# Patient Record
Sex: MALE | Race: WHITE | Employment: OTHER | URBAN - NONMETROPOLITAN AREA
[De-identification: names, ages, dates, MRNs, and addresses within clinical notes are randomized per-mention and may not be internally consistent; named-entity substitution may affect disease eponyms.]

---

## 2017-09-26 ENCOUNTER — TELEPHONE (OUTPATIENT)
Dept: CARDIOLOGY | Age: 52
End: 2017-09-26

## 2018-01-24 ENCOUNTER — OFFICE VISIT (OUTPATIENT)
Dept: CARDIOLOGY | Age: 53
End: 2018-01-24
Payer: MEDICARE

## 2018-01-24 VITALS
BODY MASS INDEX: 39.86 KG/M2 | HEIGHT: 68 IN | DIASTOLIC BLOOD PRESSURE: 78 MMHG | SYSTOLIC BLOOD PRESSURE: 110 MMHG | HEART RATE: 66 BPM | WEIGHT: 263 LBS

## 2018-01-24 DIAGNOSIS — R06.02 SHORTNESS OF BREATH: ICD-10-CM

## 2018-01-24 DIAGNOSIS — R60.0 FLUID RETENTION IN LEGS: ICD-10-CM

## 2018-01-24 DIAGNOSIS — R07.2 PRECORDIAL PAIN: Primary | ICD-10-CM

## 2018-01-24 PROCEDURE — 3017F COLORECTAL CA SCREEN DOC REV: CPT | Performed by: INTERNAL MEDICINE

## 2018-01-24 PROCEDURE — G8484 FLU IMMUNIZE NO ADMIN: HCPCS | Performed by: INTERNAL MEDICINE

## 2018-01-24 PROCEDURE — G8417 CALC BMI ABV UP PARAM F/U: HCPCS | Performed by: INTERNAL MEDICINE

## 2018-01-24 PROCEDURE — 99213 OFFICE O/P EST LOW 20 MIN: CPT | Performed by: INTERNAL MEDICINE

## 2018-01-24 PROCEDURE — G8427 DOCREV CUR MEDS BY ELIG CLIN: HCPCS | Performed by: INTERNAL MEDICINE

## 2018-01-24 PROCEDURE — 4004F PT TOBACCO SCREEN RCVD TLK: CPT | Performed by: INTERNAL MEDICINE

## 2018-01-24 RX ORDER — FUROSEMIDE 40 MG/1
40 TABLET ORAL DAILY
Qty: 30 TABLET | Refills: 3 | Status: SHIPPED | OUTPATIENT
Start: 2018-01-24 | End: 2019-09-10

## 2018-01-24 NOTE — PROGRESS NOTES
Medication Sig Dispense Refill    furosemide (LASIX) 40 MG tablet Take 1 tablet by mouth daily 30 tablet 3    atenolol-chlorthalidone (TENORETIC 50) 50-25 MG per tablet Take 1 tablet by mouth daily 30 tablet 1    losartan (COZAAR) 50 MG tablet Take 1 tablet by mouth 2 times daily 60 tablet 5    albuterol (PROVENTIL HFA;VENTOLIN HFA) 108 (90 BASE) MCG/ACT inhaler Inhale 2 puffs into the lungs every 6 hours as needed for Wheezing. Data:  BP Readings from Last 3 Encounters:   01/24/18 110/78   09/28/16 120/84   04/14/16 150/84    Pulse Readings from Last 3 Encounters:   01/24/18 66   09/28/16 76   04/14/16 78        Estimated body mass index is 39.99 kg/m² as calculated from the following:    Height as of this encounter: 5' 8\" (1.727 m). Weight as of this encounter: 263 lb (119.3 kg). REVIEW OF SYSTEMS  Constitutional:  Negative for diaphoresis, fever, appetite change or unexpected weight change. HENT:  Negative for nosebleeds, facial swelling, rhinorrhea and neck stiffness. RESPIRATORY:  c/o shortness of breath. Negative cough or sputum production. No wheezing or stridor. CARDIOVASCULAR:  There is no angina, no overt heart failure, and no syncope. GASTROINTESTINAL:   Negative for abdominal distention. GENITOURINARY:  Negative for dysuria, urgency and frequency. MUSCULOSKELETAL:   c/o lower extremity edema. Negative for myalgia, arthralgia and gait problem. SKIN:  Negative for color change, pallor, rash and wound. NEUROLOGICAL:   Negative for dizziness, weakness, light-headedness, numbness and headaches. Negative for speech difficulty. HEMATOLOGICAL:   Negative for bruising and bleeding easily. PSYCHIATRIC/BEHAVIORAL:   No excessive anxiety or confusion. Except as noted in the HPI, all other systems are negative. PHYSICAL EXAMINATION  GENERAL:  Alert and oriented x3 in no apparent distress. Short-term and long-term memory intact. Judgment intact.   Oriented to time, place and person. No depression, anxiety or agitation. Vital Signs:  /78   Pulse 66   Ht 5' 8\" (1.727 m)   Wt 263 lb (119.3 kg)   BMI 39.99 kg/m²    HEAD:  Normocephalic without evidence of old or recent trauma. EYES:  Sclerae clear. Conjunctivae pink. Pupils equal and round. NOSE:  Negative nasal discharge or epistaxis. THROAT:  No lesions on lips or buccal mucosa. NECK:  Supple without mass or JVD. Carotid pulses 2+ to palpation bilaterally without bruit. No thyromegaly noted. CHEST:  Equal bilateral expansion. RESPIRATORY:  The lungs are clear to auscultation. Normal respiratory effort. CARDIOVASCULAR:  The heart's rhythm is regular with a normal rate. No audible murmurs or gallops. ABDOMEN:  Soft, nontender. Exhibits no distension. Bowel sounds are normal.   UPPER EXTREMITY EVALUATION:  Radial pulses palpable bilaterally. No cyanosis, clubbing or edema. LOWER EXTREMITY EVALUATION:  Femoral, popliteal, dorsalis pedis, and posterior tibialis pulses 2+ to palpation bilaterally. No cyanosis, clubbing, or peripheral edema. SKIN:  Warm and dry. MUSCULOSKELETAL:  Normal muscle strength and tone. SKIN:  Warm, dry. NEUROLOGIC:  Cranial nerves II through XII are grossly intact. IMPRESSION / PLAN  1. Possible fluid retention and persistent shortness of breath - we will do a Dobutamine Stress Echo and add Lasix 40 mg po daily. 2.  Return in 4-6 weeks. Orders Placed This Encounter   Procedures    ECHO Pharmacological Stress Test     Standing Status:   Future     Standing Expiration Date:   1/24/2019     Order Specific Question:   Reason for exam:     Answer:   chest pain     Orders Placed This Encounter   Medications    furosemide (LASIX) 40 MG tablet     Sig: Take 1 tablet by mouth daily     Dispense:  30 tablet     Refill:  3     __________________________________  Radha Ferguson. Torres Araujo M.D., Ph.D., F.A.C.C. [id number] [facility]    cc (pcp):  No primary

## 2018-01-24 NOTE — PATIENT INSTRUCTIONS
Start taking Lasix 40 mg one tablet daily. Dawn at the [address] [place] [address] and [address] [facility] located on the first floor of [place] [address] through hospital main entrance and turn immediately to your left. Allergies:  Iodine; Lisinopril; and Shellfish-derived products     Patient's contact number:  [phone] (home)      Dobutamine Stress Echocardiogram     A chemical stress test uses chemical agents injected into the body through the vein. These chemicals make the heart function as if it were under stress. A chemical stress test is used when a traditional stress test (called a cardiac stress test) cannot be done. Testing will take approximately one hour. Dobutamine Stress Echocardiogram Instructions:   No caffeine 24 hours prior to the testing. This includes: coffee, pop/soda, chocolate, cold medications, etc.  Any product that might contain caffeine. Do not eat or drink anything, except water, eight (8) hours before the test.   No alcohol or nicotine twelve (12) hours prior to your test.   Wear comfortable clothing. If you need to change this appointment, please call outpatient scheduling at [phone].

## 2018-01-25 ENCOUNTER — TELEPHONE (OUTPATIENT)
Dept: CARDIOLOGY | Age: 53
End: 2018-01-25

## 2018-01-30 ENCOUNTER — HOSPITAL ENCOUNTER (OUTPATIENT)
Dept: NON INVASIVE DIAGNOSTICS | Age: 53
Discharge: HOME | End: 2018-01-30
Payer: MEDICARE

## 2018-01-30 VITALS
WEIGHT: 260.14 LBS | DIASTOLIC BLOOD PRESSURE: 52 MMHG | SYSTOLIC BLOOD PRESSURE: 106 MMHG | BODY MASS INDEX: 39.55 KG/M2 | HEART RATE: 96 BPM

## 2018-01-30 DIAGNOSIS — R07.2 PRECORDIAL PAIN: ICD-10-CM

## 2018-01-30 PROCEDURE — 93350 STRESS TTE ONLY: CPT

## 2018-01-30 PROCEDURE — 6360000002 HC RX W HCPCS: Performed by: INTERNAL MEDICINE

## 2018-01-30 PROCEDURE — 2580000003 HC RX 258: Performed by: INTERNAL MEDICINE

## 2018-01-30 RX ORDER — ATROPINE SULFATE 0.1 MG/ML
1 INJECTION INTRAVENOUS PRN
Status: ACTIVE | OUTPATIENT
Start: 2018-01-30 | End: 2018-01-31

## 2018-01-30 RX ORDER — DOBUTAMINE HYDROCHLORIDE 200 MG/100ML
10 INJECTION INTRAVENOUS CONTINUOUS PRN
Status: ACTIVE | OUTPATIENT
Start: 2018-01-30 | End: 2018-01-31

## 2018-01-30 RX ORDER — SODIUM CHLORIDE 9 MG/ML
INJECTION, SOLUTION INTRAVENOUS
Status: COMPLETED | OUTPATIENT
Start: 2018-01-30 | End: 2018-01-30

## 2018-01-30 RX ORDER — SODIUM CHLORIDE 0.9 % (FLUSH) 0.9 %
10 SYRINGE (ML) INJECTION PRN
Status: ACTIVE | OUTPATIENT
Start: 2018-01-30 | End: 2018-01-31

## 2018-01-30 RX ADMIN — Medication 10 ML: at 10:46

## 2018-01-30 RX ADMIN — ATROPINE SULFATE 1 MG: 0.1 INJECTION PARENTERAL at 10:56

## 2018-01-30 RX ADMIN — DOBUTAMINE HYDROCHLORIDE 10 MCG/KG/MIN: 200 INJECTION INTRAVENOUS at 10:47

## 2018-01-30 RX ADMIN — SODIUM CHLORIDE 250 ML: 9 INJECTION, SOLUTION INTRAVENOUS at 10:50

## 2018-02-28 ENCOUNTER — OFFICE VISIT (OUTPATIENT)
Dept: CARDIOLOGY | Age: 53
End: 2018-02-28
Payer: MEDICARE

## 2018-02-28 VITALS
DIASTOLIC BLOOD PRESSURE: 100 MMHG | HEIGHT: 67 IN | HEART RATE: 60 BPM | BODY MASS INDEX: 42.06 KG/M2 | WEIGHT: 268 LBS | SYSTOLIC BLOOD PRESSURE: 160 MMHG

## 2018-02-28 DIAGNOSIS — R60.0 FLUID RETENTION IN LEGS: ICD-10-CM

## 2018-02-28 DIAGNOSIS — I10 ESSENTIAL HYPERTENSION: Primary | ICD-10-CM

## 2018-02-28 PROCEDURE — G8484 FLU IMMUNIZE NO ADMIN: HCPCS | Performed by: INTERNAL MEDICINE

## 2018-02-28 PROCEDURE — G8427 DOCREV CUR MEDS BY ELIG CLIN: HCPCS | Performed by: INTERNAL MEDICINE

## 2018-02-28 PROCEDURE — 4004F PT TOBACCO SCREEN RCVD TLK: CPT | Performed by: INTERNAL MEDICINE

## 2018-02-28 PROCEDURE — G8417 CALC BMI ABV UP PARAM F/U: HCPCS | Performed by: INTERNAL MEDICINE

## 2018-02-28 PROCEDURE — 99213 OFFICE O/P EST LOW 20 MIN: CPT | Performed by: INTERNAL MEDICINE

## 2018-02-28 PROCEDURE — 3017F COLORECTAL CA SCREEN DOC REV: CPT | Performed by: INTERNAL MEDICINE

## 2018-03-02 NOTE — PROGRESS NOTES
prior to his next appointment in May. 3.  Continue same medications. 4.  Return in three months.               __________________________________  Kwesi Gipson. Luis Zurita M.D., Ph.D., F.A.C.C. [facility]    cc (pcp): No primary care provider on file.

## 2019-06-24 ENCOUNTER — HOSPITAL ENCOUNTER (EMERGENCY)
Facility: HOSPITAL | Age: 54
Discharge: HOME | End: 2019-06-24
Attending: EMERGENCY MEDICINE | Admitting: EMERGENCY MEDICINE

## 2019-06-24 VITALS
HEART RATE: 84 BPM | HEIGHT: 68 IN | RESPIRATION RATE: 16 BRPM | WEIGHT: 273 LBS | OXYGEN SATURATION: 96 % | SYSTOLIC BLOOD PRESSURE: 152 MMHG | TEMPERATURE: 98.2 F | BODY MASS INDEX: 41.37 KG/M2 | DIASTOLIC BLOOD PRESSURE: 90 MMHG

## 2019-06-24 DIAGNOSIS — J39.2 PHARYNGEAL IRRITATION: Primary | ICD-10-CM

## 2019-06-24 PROBLEM — J37.0 CHRONIC LARYNGITIS: Status: ACTIVE | Noted: 2019-06-24

## 2019-06-24 PROBLEM — R09.89 GLOBUS SENSATION: Status: ACTIVE | Noted: 2019-06-24

## 2019-06-24 PROBLEM — R09.A2 GLOBUS SENSATION: Status: ACTIVE | Noted: 2019-06-24

## 2019-06-24 PROCEDURE — 99283 EMERGENCY DEPT VISIT LOW MDM: CPT | Performed by: OTOLARYNGOLOGY

## 2019-06-24 PROCEDURE — 31575 DIAGNOSTIC LARYNGOSCOPY: CPT | Performed by: OTOLARYNGOLOGY

## 2019-06-24 PROCEDURE — 99282 EMERGENCY DEPT VISIT SF MDM: CPT

## 2019-06-24 RX ORDER — OMEPRAZOLE 40 MG/1
40 CAPSULE, DELAYED RELEASE ORAL DAILY
Qty: 30 CAPSULE | Refills: 3 | Status: SHIPPED | OUTPATIENT
Start: 2019-06-24 | End: 2019-07-24

## 2019-06-24 RX ORDER — GUAIFENESIN 600 MG/1
600 TABLET, EXTENDED RELEASE ORAL EVERY 12 HOURS SCHEDULED
Qty: 60 TABLET | Refills: 3 | Status: SHIPPED | OUTPATIENT
Start: 2019-06-24 | End: 2019-07-24

## 2019-09-10 ENCOUNTER — TELEPHONE (OUTPATIENT)
Dept: CARDIOLOGY | Age: 54
End: 2019-09-10

## 2019-09-10 ENCOUNTER — OFFICE VISIT (OUTPATIENT)
Dept: CARDIOLOGY | Age: 54
End: 2019-09-10
Payer: MEDICARE

## 2019-09-10 VITALS
SYSTOLIC BLOOD PRESSURE: 130 MMHG | HEART RATE: 102 BPM | OXYGEN SATURATION: 98 % | BODY MASS INDEX: 43.07 KG/M2 | WEIGHT: 275 LBS | DIASTOLIC BLOOD PRESSURE: 80 MMHG

## 2019-09-10 DIAGNOSIS — R00.0 TACHYCARDIA: ICD-10-CM

## 2019-09-10 DIAGNOSIS — R40.0 DAYTIME SOMNOLENCE: ICD-10-CM

## 2019-09-10 DIAGNOSIS — G47.10 HYPERSOMNIA: ICD-10-CM

## 2019-09-10 DIAGNOSIS — I10 ESSENTIAL HYPERTENSION: Primary | ICD-10-CM

## 2019-09-10 DIAGNOSIS — R06.09 DYSPNEA ON EXERTION: ICD-10-CM

## 2019-09-10 PROCEDURE — 99214 OFFICE O/P EST MOD 30 MIN: CPT | Performed by: NURSE PRACTITIONER

## 2019-09-10 PROCEDURE — 93000 ELECTROCARDIOGRAM COMPLETE: CPT | Performed by: NURSE PRACTITIONER

## 2019-09-10 PROCEDURE — 0296T PR EXT ECG > 48HR TO 21 DAY RCRD W/CONECT INTL RCRD: CPT | Performed by: NURSE PRACTITIONER

## 2019-09-10 RX ORDER — LOSARTAN POTASSIUM 50 MG/1
50 TABLET ORAL DAILY
Qty: 90 TABLET | Refills: 3 | Status: SHIPPED | OUTPATIENT
Start: 2019-09-10 | End: 2019-10-14 | Stop reason: SDUPTHER

## 2019-09-10 RX ORDER — PREGABALIN 50 MG/1
50 CAPSULE ORAL 2 TIMES DAILY
COMMUNITY
End: 2020-03-10 | Stop reason: SDUPTHER

## 2019-09-10 NOTE — PROGRESS NOTES
[facility]  Established Patient Office Visit   Gregorio Posadas. [facility] [facility] [id number] [phone]        OFFICE VISIT:  9/10/2019    Emile Miller - : 1965    Reason For Visit:  José Miguel Barragan is a 53 y.o. male who is here for Follow-up (Complains of shortness of breath for last 3-4 years; pt request a heart cath; no stress test ) and Hypertension    1. Essential hypertension    2. Dyspnea on exertion    3. Tachycardia      Patient presents to clinic with complaints of shortness of breath for the last 3 to 4 years with exertion that has worsened over the last several months. She states just walking around the house he gets short of breath. He denies any chest pain, pressure or tightness. Patient states he does have sleep apnea that is not treated. Patient has a very strong family history of coronary artery disease. Patient has also noted tachycardia/palpitations. There is no associated lightheadedness, dizziness or syncope. Last stress test 2018 dobutamine stress echo without clinical, electrocardiographic, or echocardiographic evidence of myocardial ischemia. Subjective    Emile Miller is a 53 y.o. male with the following history as [facility] in [facility]:    Patient Active Problem List    Diagnosis Date Noted    Essential hypertension 2016    Hypertension     Other and unspecified hyperlipidemia      Current Outpatient Medications   Medication Sig Dispense Refill    pregabalin (LYRICA) 50 MG capsule Take 50 mg by mouth 2 times daily.  losartan (COZAAR) 50 MG tablet Take 1 tablet by mouth 2 times daily (Patient taking differently: Take 50 mg by mouth daily ) 60 tablet 5    albuterol (PROVENTIL HFA;VENTOLIN HFA) 108 (90 BASE) MCG/ACT inhaler Inhale 2 puffs into the lungs every 6 hours as needed for Wheezing. No current facility-administered medications for this visit. Allergies: Iodine;  Lisinopril; and Shellfish-derived products  Past

## 2019-10-08 ENCOUNTER — TELEPHONE (OUTPATIENT)
Dept: CARDIOLOGY | Age: 54
End: 2019-10-08

## 2019-10-09 ENCOUNTER — HOSPITAL ENCOUNTER (OUTPATIENT)
Dept: NON INVASIVE DIAGNOSTICS | Age: 54
Discharge: HOME | End: 2019-10-09
Payer: MEDICARE

## 2019-10-09 ENCOUNTER — HOSPITAL ENCOUNTER (OUTPATIENT)
Dept: NUCLEAR MEDICINE | Age: 54
Discharge: HOME | End: 2019-10-11
Payer: MEDICARE

## 2019-10-09 DIAGNOSIS — R06.09 DYSPNEA ON EXERTION: ICD-10-CM

## 2019-10-09 DIAGNOSIS — R00.0 TACHYCARDIA: ICD-10-CM

## 2019-10-09 LAB
LV EF: 58 %
LVEF MODALITY: NORMAL

## 2019-10-09 PROCEDURE — 78452 HT MUSCLE IMAGE SPECT MULT: CPT

## 2019-10-09 PROCEDURE — 3430000000 HC RX DIAGNOSTIC RADIOPHARMACEUTICAL: Performed by: NURSE PRACTITIONER

## 2019-10-09 PROCEDURE — A9500 TC99M SESTAMIBI: HCPCS | Performed by: NURSE PRACTITIONER

## 2019-10-09 PROCEDURE — 93306 TTE W/DOPPLER COMPLETE: CPT

## 2019-10-09 PROCEDURE — 6360000002 HC RX W HCPCS: Performed by: INTERNAL MEDICINE

## 2019-10-09 PROCEDURE — 93017 CV STRESS TEST TRACING ONLY: CPT

## 2019-10-09 RX ADMIN — REGADENOSON 0.4 MG: 0.08 INJECTION, SOLUTION INTRAVENOUS at 12:30

## 2019-10-09 RX ADMIN — TETRAKIS(2-METHOXYISOBUTYLISOCYANIDE)COPPER(I) TETRAFLUOROBORATE 10 MILLICURIE: 1 INJECTION, POWDER, LYOPHILIZED, FOR SOLUTION INTRAVENOUS at 12:45

## 2019-10-09 RX ADMIN — TETRAKIS(2-METHOXYISOBUTYLISOCYANIDE)COPPER(I) TETRAFLUOROBORATE 30 MILLICURIE: 1 INJECTION, POWDER, LYOPHILIZED, FOR SOLUTION INTRAVENOUS at 12:45

## 2019-10-10 LAB
LV EF: 68 %
LVEF MODALITY: NORMAL

## 2019-10-14 ENCOUNTER — OFFICE VISIT (OUTPATIENT)
Dept: CARDIOLOGY | Age: 54
End: 2019-10-14
Payer: MEDICARE

## 2019-10-14 VITALS
OXYGEN SATURATION: 99 % | SYSTOLIC BLOOD PRESSURE: 140 MMHG | WEIGHT: 280 LBS | HEART RATE: 92 BPM | HEIGHT: 68 IN | DIASTOLIC BLOOD PRESSURE: 80 MMHG | BODY MASS INDEX: 42.44 KG/M2

## 2019-10-14 DIAGNOSIS — I10 ESSENTIAL HYPERTENSION: Primary | ICD-10-CM

## 2019-10-14 PROCEDURE — G8417 CALC BMI ABV UP PARAM F/U: HCPCS | Performed by: NURSE PRACTITIONER

## 2019-10-14 PROCEDURE — 4004F PT TOBACCO SCREEN RCVD TLK: CPT | Performed by: NURSE PRACTITIONER

## 2019-10-14 PROCEDURE — G8427 DOCREV CUR MEDS BY ELIG CLIN: HCPCS | Performed by: NURSE PRACTITIONER

## 2019-10-14 PROCEDURE — G8484 FLU IMMUNIZE NO ADMIN: HCPCS | Performed by: NURSE PRACTITIONER

## 2019-10-14 PROCEDURE — 99214 OFFICE O/P EST MOD 30 MIN: CPT | Performed by: NURSE PRACTITIONER

## 2019-10-14 PROCEDURE — 3017F COLORECTAL CA SCREEN DOC REV: CPT | Performed by: NURSE PRACTITIONER

## 2019-10-14 RX ORDER — LOSARTAN POTASSIUM 50 MG/1
50 TABLET ORAL DAILY
Qty: 90 TABLET | Refills: 3 | Status: SHIPPED | OUTPATIENT
Start: 2019-10-14 | End: 2020-01-10 | Stop reason: SDUPTHER

## 2019-10-15 RX ORDER — PREDNISONE 20 MG/1
TABLET ORAL
Qty: 10 TABLET | Refills: 0 | Status: ON HOLD | OUTPATIENT
Start: 2019-10-22 | End: 2019-10-23 | Stop reason: HOSPADM

## 2019-10-15 RX ORDER — DIPHENHYDRAMINE HCL 25 MG
TABLET ORAL
Qty: 5 TABLET | Refills: 0 | Status: ON HOLD | OUTPATIENT
Start: 2019-10-22 | End: 2019-10-23 | Stop reason: HOSPADM

## 2019-10-15 RX ORDER — CIMETIDINE 300 MG/1
TABLET, FILM COATED ORAL
Qty: 4 TABLET | Refills: 0 | Status: SHIPPED | OUTPATIENT
Start: 2019-10-22 | End: 2020-01-10 | Stop reason: ALTCHOICE

## 2019-10-23 ENCOUNTER — HOSPITAL ENCOUNTER (OUTPATIENT)
Dept: CARDIAC CATH/INVASIVE PROCEDURES | Age: 54
Discharge: HOME | End: 2019-10-23
Attending: INTERNAL MEDICINE | Admitting: INTERNAL MEDICINE
Payer: MEDICARE

## 2019-10-23 ENCOUNTER — APPOINTMENT (OUTPATIENT)
Dept: GENERAL RADIOLOGY | Age: 54
End: 2019-10-23
Attending: INTERNAL MEDICINE
Payer: MEDICARE

## 2019-10-23 VITALS
TEMPERATURE: 97.8 F | HEART RATE: 98 BPM | RESPIRATION RATE: 20 BRPM | WEIGHT: 280 LBS | HEIGHT: 68 IN | DIASTOLIC BLOOD PRESSURE: 70 MMHG | SYSTOLIC BLOOD PRESSURE: 131 MMHG | BODY MASS INDEX: 42.44 KG/M2 | OXYGEN SATURATION: 90 %

## 2019-10-23 PROBLEM — R07.89 CHEST DISCOMFORT: Status: ACTIVE | Noted: 2019-10-23

## 2019-10-23 LAB
ANION GAP SERPL CALCULATED.3IONS-SCNC: 14 MMOL/L (ref 7–19)
BUN BLDV-MCNC: 25 MG/DL (ref 6–20)
CALCIUM SERPL-MCNC: 10.1 MG/DL (ref 8.6–10)
CHLORIDE BLD-SCNC: 107 MMOL/L (ref 98–111)
CO2: 18 MMOL/L (ref 22–29)
CREAT SERPL-MCNC: 0.9 MG/DL (ref 0.5–1.2)
GFR NON-AFRICAN AMERICAN: >60
GLUCOSE BLD-MCNC: 143 MG/DL (ref 74–109)
HCT VFR BLD CALC: 45.4 % (ref 42–52)
HEMOGLOBIN: 13.8 G/DL (ref 14–18)
MCH RBC QN AUTO: 30.2 PG (ref 27–31)
MCHC RBC AUTO-ENTMCNC: 30.4 G/DL (ref 33–37)
MCV RBC AUTO: 99.3 FL (ref 80–94)
PDW BLD-RTO: 13.5 % (ref 11.5–14.5)
PLATELET # BLD: 458 K/UL (ref 130–400)
PMV BLD AUTO: 9.7 FL (ref 9.4–12.4)
POTASSIUM SERPL-SCNC: 4.4 MMOL/L (ref 3.5–5)
RBC # BLD: 4.57 M/UL (ref 4.7–6.1)
SODIUM BLD-SCNC: 139 MMOL/L (ref 136–145)
WBC # BLD: 30.8 K/UL (ref 4.8–10.8)

## 2019-10-23 PROCEDURE — C1894 INTRO/SHEATH, NON-LASER: HCPCS

## 2019-10-23 PROCEDURE — 71046 X-RAY EXAM CHEST 2 VIEWS: CPT

## 2019-10-23 PROCEDURE — 2580000003 HC RX 258: Performed by: INTERNAL MEDICINE

## 2019-10-23 PROCEDURE — 6370000000 HC RX 637 (ALT 250 FOR IP): Performed by: INTERNAL MEDICINE

## 2019-10-23 PROCEDURE — 93459 L HRT ART/GRFT ANGIO: CPT | Performed by: INTERNAL MEDICINE

## 2019-10-23 PROCEDURE — 36415 COLL VENOUS BLD VENIPUNCTURE: CPT

## 2019-10-23 PROCEDURE — C1760 CLOSURE DEV, VASC: HCPCS

## 2019-10-23 PROCEDURE — 80048 BASIC METABOLIC PNL TOTAL CA: CPT

## 2019-10-23 PROCEDURE — 6360000004 HC RX CONTRAST MEDICATION: Performed by: INTERNAL MEDICINE

## 2019-10-23 PROCEDURE — 99152 MOD SED SAME PHYS/QHP 5/>YRS: CPT | Performed by: INTERNAL MEDICINE

## 2019-10-23 PROCEDURE — 2709999900 HC NON-CHARGEABLE SUPPLY

## 2019-10-23 PROCEDURE — 85027 COMPLETE CBC AUTOMATED: CPT

## 2019-10-23 PROCEDURE — 6360000002 HC RX W HCPCS

## 2019-10-23 PROCEDURE — 2500000003 HC RX 250 WO HCPCS

## 2019-10-23 RX ORDER — SODIUM CHLORIDE 0.9 % (FLUSH) 0.9 %
10 SYRINGE (ML) INJECTION PRN
Status: DISCONTINUED | OUTPATIENT
Start: 2019-10-23 | End: 2019-10-23 | Stop reason: HOSPADM

## 2019-10-23 RX ORDER — ALPRAZOLAM 0.5 MG/1
0.5 TABLET ORAL NIGHTLY PRN
Status: DISCONTINUED | OUTPATIENT
Start: 2019-10-23 | End: 2019-10-23 | Stop reason: CLARIF

## 2019-10-23 RX ORDER — SODIUM CHLORIDE 9 MG/ML
INJECTION, SOLUTION INTRAVENOUS CONTINUOUS
Status: DISCONTINUED | OUTPATIENT
Start: 2019-10-23 | End: 2019-10-23 | Stop reason: HOSPADM

## 2019-10-23 RX ORDER — SODIUM CHLORIDE 0.9 % (FLUSH) 0.9 %
10 SYRINGE (ML) INJECTION EVERY 12 HOURS SCHEDULED
Status: DISCONTINUED | OUTPATIENT
Start: 2019-10-23 | End: 2019-10-23 | Stop reason: HOSPADM

## 2019-10-23 RX ORDER — ALPRAZOLAM 0.5 MG/1
0.5 TABLET ORAL ONCE
Status: COMPLETED | OUTPATIENT
Start: 2019-10-23 | End: 2019-10-23

## 2019-10-23 RX ORDER — IODIXANOL 320 MG/ML
150 INJECTION, SOLUTION INTRAVASCULAR
Status: COMPLETED | OUTPATIENT
Start: 2019-10-23 | End: 2019-10-23

## 2019-10-23 RX ORDER — HYDROCODONE BITARTRATE AND ACETAMINOPHEN 10; 325 MG/1; MG/1
1 TABLET ORAL ONCE
Status: COMPLETED | OUTPATIENT
Start: 2019-10-23 | End: 2019-10-23

## 2019-10-23 RX ADMIN — ALPRAZOLAM 0.5 MG: 0.5 TABLET ORAL at 09:49

## 2019-10-23 RX ADMIN — SODIUM CHLORIDE: 9 INJECTION, SOLUTION INTRAVENOUS at 09:49

## 2019-10-23 RX ADMIN — HYDROCODONE BITARTRATE AND ACETAMINOPHEN 1 TABLET: 10; 325 TABLET ORAL at 15:13

## 2019-10-23 RX ADMIN — IODIXANOL 131 ML: 320 INJECTION, SOLUTION INTRAVASCULAR at 13:46

## 2019-10-23 ASSESSMENT — PAIN DESCRIPTION - LOCATION: LOCATION: BACK;NECK;HEAD

## 2019-10-23 ASSESSMENT — PAIN DESCRIPTION - FREQUENCY: FREQUENCY: CONTINUOUS

## 2019-10-23 ASSESSMENT — PAIN DESCRIPTION - ONSET: ONSET: ON-GOING

## 2019-10-23 ASSESSMENT — PAIN DESCRIPTION - DESCRIPTORS: DESCRIPTORS: ACHING;STABBING

## 2019-10-23 ASSESSMENT — PAIN DESCRIPTION - PAIN TYPE: TYPE: CHRONIC PAIN

## 2019-10-23 ASSESSMENT — PAIN SCALES - GENERAL
PAINLEVEL_OUTOF10: 4
PAINLEVEL_OUTOF10: 7

## 2019-10-23 ASSESSMENT — PAIN - FUNCTIONAL ASSESSMENT: PAIN_FUNCTIONAL_ASSESSMENT: PREVENTS OR INTERFERES WITH MANY ACTIVE NOT PASSIVE ACTIVITIES

## 2019-10-23 ASSESSMENT — PAIN DESCRIPTION - PROGRESSION: CLINICAL_PROGRESSION: NOT CHANGED

## 2019-10-24 ENCOUNTER — TELEPHONE (OUTPATIENT)
Dept: HEMATOLOGY | Age: 54
End: 2019-10-24

## 2019-10-24 ENCOUNTER — HOSPITAL ENCOUNTER (OUTPATIENT)
Dept: INFUSION THERAPY | Age: 54
Discharge: HOME | End: 2019-10-24
Payer: MEDICARE

## 2019-10-24 ENCOUNTER — OFFICE VISIT (OUTPATIENT)
Dept: HEMATOLOGY | Age: 54
End: 2019-10-24
Payer: MEDICARE

## 2019-10-24 VITALS
BODY MASS INDEX: 41.82 KG/M2 | DIASTOLIC BLOOD PRESSURE: 88 MMHG | HEIGHT: 68 IN | HEART RATE: 104 BPM | SYSTOLIC BLOOD PRESSURE: 136 MMHG | OXYGEN SATURATION: 95 % | WEIGHT: 275.9 LBS

## 2019-10-24 DIAGNOSIS — Z90.81 H/O SPLENECTOMY: ICD-10-CM

## 2019-10-24 DIAGNOSIS — D72.829 LEUKOCYTOSIS, UNSPECIFIED TYPE: ICD-10-CM

## 2019-10-24 DIAGNOSIS — D75.839 THROMBOCYTOSIS: ICD-10-CM

## 2019-10-24 DIAGNOSIS — D72.829 LEUKOCYTOSIS, UNSPECIFIED TYPE: Primary | ICD-10-CM

## 2019-10-24 LAB
LV EF: 68 %
LVEF MODALITY: NORMAL

## 2019-10-24 PROCEDURE — 4004F PT TOBACCO SCREEN RCVD TLK: CPT | Performed by: PHYSICIAN ASSISTANT

## 2019-10-24 PROCEDURE — 80053 COMPREHEN METABOLIC PANEL: CPT

## 2019-10-24 PROCEDURE — 86140 C-REACTIVE PROTEIN: CPT

## 2019-10-24 PROCEDURE — G8427 DOCREV CUR MEDS BY ELIG CLIN: HCPCS | Performed by: PHYSICIAN ASSISTANT

## 2019-10-24 PROCEDURE — 85025 COMPLETE CBC W/AUTO DIFF WBC: CPT

## 2019-10-24 PROCEDURE — G8417 CALC BMI ABV UP PARAM F/U: HCPCS | Performed by: PHYSICIAN ASSISTANT

## 2019-10-24 PROCEDURE — 3017F COLORECTAL CA SCREEN DOC REV: CPT | Performed by: PHYSICIAN ASSISTANT

## 2019-10-24 PROCEDURE — 99203 OFFICE O/P NEW LOW 30 MIN: CPT | Performed by: PHYSICIAN ASSISTANT

## 2019-10-24 PROCEDURE — G8484 FLU IMMUNIZE NO ADMIN: HCPCS | Performed by: PHYSICIAN ASSISTANT

## 2019-10-24 ASSESSMENT — ENCOUNTER SYMPTOMS
NAUSEA: 0
ABDOMINAL DISTENTION: 0
COLOR CHANGE: 0
VOMITING: 0
PHOTOPHOBIA: 0
TROUBLE SWALLOWING: 0
SHORTNESS OF BREATH: 1
WHEEZING: 0
ABDOMINAL PAIN: 0
BACK PAIN: 1
COUGH: 0
CONSTIPATION: 0
SORE THROAT: 0
EYE DISCHARGE: 0
BLOOD IN STOOL: 0
DIARRHEA: 0
VOICE CHANGE: 0
EYE ITCHING: 0

## 2019-11-07 ENCOUNTER — HOSPITAL ENCOUNTER (OUTPATIENT)
Dept: INFUSION THERAPY | Age: 54
Discharge: HOME | End: 2019-11-07
Payer: MEDICARE

## 2019-11-07 ENCOUNTER — OFFICE VISIT (OUTPATIENT)
Dept: HEMATOLOGY | Age: 54
End: 2019-11-07
Payer: MEDICARE

## 2019-11-07 VITALS
HEIGHT: 68 IN | DIASTOLIC BLOOD PRESSURE: 90 MMHG | SYSTOLIC BLOOD PRESSURE: 150 MMHG | HEART RATE: 103 BPM | OXYGEN SATURATION: 96 % | WEIGHT: 278.5 LBS | BODY MASS INDEX: 42.21 KG/M2

## 2019-11-07 DIAGNOSIS — I25.10 ATHEROSCLEROSIS OF NATIVE CORONARY ARTERY OF NATIVE HEART WITHOUT ANGINA PECTORIS: ICD-10-CM

## 2019-11-07 DIAGNOSIS — I73.9 PAD (PERIPHERAL ARTERY DISEASE) (HCC): ICD-10-CM

## 2019-11-07 DIAGNOSIS — D72.829 LEUKOCYTOSIS, UNSPECIFIED TYPE: Primary | ICD-10-CM

## 2019-11-07 DIAGNOSIS — Z90.81 HISTORY OF SPLENECTOMY: ICD-10-CM

## 2019-11-07 DIAGNOSIS — D72.829 LEUKOCYTOSIS, UNSPECIFIED TYPE: ICD-10-CM

## 2019-11-07 PROCEDURE — 99213 OFFICE O/P EST LOW 20 MIN: CPT | Performed by: PHYSICIAN ASSISTANT

## 2019-11-07 PROCEDURE — 3017F COLORECTAL CA SCREEN DOC REV: CPT | Performed by: PHYSICIAN ASSISTANT

## 2019-11-07 PROCEDURE — 85025 COMPLETE CBC W/AUTO DIFF WBC: CPT

## 2019-11-07 PROCEDURE — 4004F PT TOBACCO SCREEN RCVD TLK: CPT | Performed by: PHYSICIAN ASSISTANT

## 2019-11-07 PROCEDURE — G8599 NO ASA/ANTIPLAT THER USE RNG: HCPCS | Performed by: PHYSICIAN ASSISTANT

## 2019-11-07 PROCEDURE — G8417 CALC BMI ABV UP PARAM F/U: HCPCS | Performed by: PHYSICIAN ASSISTANT

## 2019-11-07 PROCEDURE — G8484 FLU IMMUNIZE NO ADMIN: HCPCS | Performed by: PHYSICIAN ASSISTANT

## 2019-11-07 PROCEDURE — G8427 DOCREV CUR MEDS BY ELIG CLIN: HCPCS | Performed by: PHYSICIAN ASSISTANT

## 2019-11-07 ASSESSMENT — ENCOUNTER SYMPTOMS
SORE THROAT: 0
VOMITING: 0
TROUBLE SWALLOWING: 0
SHORTNESS OF BREATH: 1
EYE DISCHARGE: 0
BACK PAIN: 1
DIARRHEA: 0
VOICE CHANGE: 0
CONSTIPATION: 0
COLOR CHANGE: 0
NAUSEA: 0
BLOOD IN STOOL: 0
WHEEZING: 0
PHOTOPHOBIA: 0
COUGH: 0
ABDOMINAL DISTENTION: 0
ABDOMINAL PAIN: 0
EYE ITCHING: 0

## 2019-11-08 ENCOUNTER — TELEPHONE (OUTPATIENT)
Dept: CARDIOLOGY | Age: 54
End: 2019-11-08

## 2019-11-26 ENCOUNTER — OFFICE VISIT (OUTPATIENT)
Dept: CARDIOLOGY | Age: 54
End: 2019-11-26
Payer: MEDICARE

## 2019-11-26 VITALS
SYSTOLIC BLOOD PRESSURE: 110 MMHG | DIASTOLIC BLOOD PRESSURE: 60 MMHG | HEIGHT: 68 IN | BODY MASS INDEX: 41.52 KG/M2 | HEART RATE: 98 BPM | WEIGHT: 274 LBS

## 2019-11-26 DIAGNOSIS — Z90.81 HISTORY OF SPLENECTOMY: ICD-10-CM

## 2019-11-26 DIAGNOSIS — D72.829 LEUKOCYTOSIS, UNSPECIFIED TYPE: ICD-10-CM

## 2019-11-26 DIAGNOSIS — Z86.69 HISTORY OF SLEEP APNEA: ICD-10-CM

## 2019-11-26 DIAGNOSIS — I10 ESSENTIAL HYPERTENSION: ICD-10-CM

## 2019-11-26 DIAGNOSIS — I25.10 CORONARY ARTERY DISEASE INVOLVING NATIVE CORONARY ARTERY OF NATIVE HEART, ANGINA PRESENCE UNSPECIFIED: Primary | ICD-10-CM

## 2019-11-26 DIAGNOSIS — R07.89 OTHER CHEST PAIN: ICD-10-CM

## 2019-11-26 PROCEDURE — G8599 NO ASA/ANTIPLAT THER USE RNG: HCPCS | Performed by: NURSE PRACTITIONER

## 2019-11-26 PROCEDURE — G8417 CALC BMI ABV UP PARAM F/U: HCPCS | Performed by: NURSE PRACTITIONER

## 2019-11-26 PROCEDURE — G8484 FLU IMMUNIZE NO ADMIN: HCPCS | Performed by: NURSE PRACTITIONER

## 2019-11-26 PROCEDURE — G8427 DOCREV CUR MEDS BY ELIG CLIN: HCPCS | Performed by: NURSE PRACTITIONER

## 2019-11-26 PROCEDURE — 99214 OFFICE O/P EST MOD 30 MIN: CPT | Performed by: NURSE PRACTITIONER

## 2019-11-26 PROCEDURE — 3017F COLORECTAL CA SCREEN DOC REV: CPT | Performed by: NURSE PRACTITIONER

## 2019-11-26 PROCEDURE — 4004F PT TOBACCO SCREEN RCVD TLK: CPT | Performed by: NURSE PRACTITIONER

## 2019-11-26 RX ORDER — METOPROLOL SUCCINATE 25 MG/1
25 TABLET, EXTENDED RELEASE ORAL DAILY
Qty: 30 TABLET | Refills: 1 | Status: SHIPPED | OUTPATIENT
Start: 2019-11-26 | End: 2020-02-10 | Stop reason: SDUPTHER

## 2019-11-26 RX ORDER — ISOSORBIDE MONONITRATE 30 MG/1
30 TABLET, EXTENDED RELEASE ORAL NIGHTLY
Qty: 30 TABLET | Refills: 1 | Status: SHIPPED | OUTPATIENT
Start: 2019-11-26 | End: 2020-02-24

## 2019-11-27 ENCOUNTER — TELEPHONE (OUTPATIENT)
Dept: CARDIOLOGY | Age: 54
End: 2019-11-27

## 2019-11-27 PROBLEM — R07.89 OTHER CHEST PAIN: Status: ACTIVE | Noted: 2019-11-27

## 2019-11-27 PROBLEM — Z90.81 HISTORY OF SPLENECTOMY: Status: ACTIVE | Noted: 2019-11-27

## 2019-11-27 PROBLEM — Z86.69 HISTORY OF SLEEP APNEA: Status: ACTIVE | Noted: 2019-11-27

## 2019-11-27 PROBLEM — I25.10 CORONARY ARTERY DISEASE INVOLVING NATIVE CORONARY ARTERY OF NATIVE HEART: Status: ACTIVE | Noted: 2019-11-27

## 2019-12-02 ENCOUNTER — TELEPHONE (OUTPATIENT)
Dept: NEUROLOGY | Age: 54
End: 2019-12-02

## 2019-12-02 DIAGNOSIS — I48.91 ATRIAL FIBRILLATION, UNSPECIFIED TYPE (HCC): Primary | ICD-10-CM

## 2019-12-03 ENCOUNTER — TELEPHONE (OUTPATIENT)
Dept: NEUROLOGY | Age: 54
End: 2019-12-03

## 2019-12-03 ENCOUNTER — TELEPHONE (OUTPATIENT)
Dept: CARDIOTHORACIC SURGERY | Age: 54
End: 2019-12-03

## 2019-12-05 ENCOUNTER — TELEPHONE (OUTPATIENT)
Dept: CARDIOTHORACIC SURGERY | Age: 54
End: 2019-12-05

## 2019-12-17 ENCOUNTER — TELEPHONE (OUTPATIENT)
Dept: CARDIOLOGY | Age: 54
End: 2019-12-17

## 2019-12-17 ENCOUNTER — OFFICE VISIT (OUTPATIENT)
Dept: CARDIOTHORACIC SURGERY | Age: 54
End: 2019-12-17
Payer: MEDICARE

## 2019-12-17 VITALS
HEART RATE: 102 BPM | OXYGEN SATURATION: 97 % | BODY MASS INDEX: 41.52 KG/M2 | HEIGHT: 68 IN | WEIGHT: 274 LBS | DIASTOLIC BLOOD PRESSURE: 102 MMHG | SYSTOLIC BLOOD PRESSURE: 148 MMHG

## 2019-12-17 DIAGNOSIS — I25.10 ATHEROSCLEROSIS OF NATIVE CORONARY ARTERY OF NATIVE HEART WITHOUT ANGINA PECTORIS: Primary | ICD-10-CM

## 2019-12-17 DIAGNOSIS — E66.01 MORBID OBESITY WITH BMI OF 40.0-44.9, ADULT (HCC): ICD-10-CM

## 2019-12-17 DIAGNOSIS — J43.1 PANLOBULAR EMPHYSEMA (HCC): ICD-10-CM

## 2019-12-17 PROCEDURE — G8599 NO ASA/ANTIPLAT THER USE RNG: HCPCS | Performed by: THORACIC SURGERY (CARDIOTHORACIC VASCULAR SURGERY)

## 2019-12-17 PROCEDURE — G8484 FLU IMMUNIZE NO ADMIN: HCPCS | Performed by: THORACIC SURGERY (CARDIOTHORACIC VASCULAR SURGERY)

## 2019-12-17 PROCEDURE — G8427 DOCREV CUR MEDS BY ELIG CLIN: HCPCS | Performed by: THORACIC SURGERY (CARDIOTHORACIC VASCULAR SURGERY)

## 2019-12-17 PROCEDURE — 3017F COLORECTAL CA SCREEN DOC REV: CPT | Performed by: THORACIC SURGERY (CARDIOTHORACIC VASCULAR SURGERY)

## 2019-12-17 PROCEDURE — 4004F PT TOBACCO SCREEN RCVD TLK: CPT | Performed by: THORACIC SURGERY (CARDIOTHORACIC VASCULAR SURGERY)

## 2019-12-17 PROCEDURE — G8417 CALC BMI ABV UP PARAM F/U: HCPCS | Performed by: THORACIC SURGERY (CARDIOTHORACIC VASCULAR SURGERY)

## 2019-12-17 PROCEDURE — G8926 SPIRO NO PERF OR DOC: HCPCS | Performed by: THORACIC SURGERY (CARDIOTHORACIC VASCULAR SURGERY)

## 2019-12-17 PROCEDURE — 99204 OFFICE O/P NEW MOD 45 MIN: CPT | Performed by: THORACIC SURGERY (CARDIOTHORACIC VASCULAR SURGERY)

## 2019-12-17 PROCEDURE — 3023F SPIROM DOC REV: CPT | Performed by: THORACIC SURGERY (CARDIOTHORACIC VASCULAR SURGERY)

## 2020-01-02 RX ORDER — AMLODIPINE BESYLATE 5 MG/1
5 TABLET ORAL DAILY
Qty: 90 TABLET | Refills: 1 | Status: SHIPPED | OUTPATIENT
Start: 2020-01-02 | End: 2020-01-14

## 2020-01-10 ENCOUNTER — OFFICE VISIT (OUTPATIENT)
Dept: PRIMARY CARE CLINIC | Age: 55
End: 2020-01-10
Payer: MEDICARE

## 2020-01-10 VITALS
DIASTOLIC BLOOD PRESSURE: 62 MMHG | HEIGHT: 68 IN | HEART RATE: 85 BPM | OXYGEN SATURATION: 95 % | TEMPERATURE: 98.2 F | SYSTOLIC BLOOD PRESSURE: 124 MMHG | WEIGHT: 276 LBS | BODY MASS INDEX: 41.83 KG/M2

## 2020-01-10 PROBLEM — H02.63 XANTHELASMA OF EYELID, BILATERAL: Status: ACTIVE | Noted: 2020-01-10

## 2020-01-10 PROBLEM — H02.66 XANTHELASMA OF EYELID, BILATERAL: Status: ACTIVE | Noted: 2020-01-10

## 2020-01-10 PROCEDURE — G8427 DOCREV CUR MEDS BY ELIG CLIN: HCPCS | Performed by: NURSE PRACTITIONER

## 2020-01-10 PROCEDURE — G8484 FLU IMMUNIZE NO ADMIN: HCPCS | Performed by: NURSE PRACTITIONER

## 2020-01-10 PROCEDURE — 3017F COLORECTAL CA SCREEN DOC REV: CPT | Performed by: NURSE PRACTITIONER

## 2020-01-10 PROCEDURE — 4004F PT TOBACCO SCREEN RCVD TLK: CPT | Performed by: NURSE PRACTITIONER

## 2020-01-10 PROCEDURE — G8417 CALC BMI ABV UP PARAM F/U: HCPCS | Performed by: NURSE PRACTITIONER

## 2020-01-10 PROCEDURE — 99215 OFFICE O/P EST HI 40 MIN: CPT | Performed by: NURSE PRACTITIONER

## 2020-01-10 RX ORDER — LOSARTAN POTASSIUM 50 MG/1
50 TABLET ORAL DAILY
Qty: 90 TABLET | Refills: 1 | Status: SHIPPED | OUTPATIENT
Start: 2020-01-10 | End: 2020-01-14

## 2020-01-10 RX ORDER — OXYMETAZOLINE HYDROCHLORIDE 0.05 G/100ML
2 SPRAY NASAL 2 TIMES DAILY
COMMUNITY
End: 2020-01-14

## 2020-01-10 RX ORDER — ALBUTEROL SULFATE 90 UG/1
2 AEROSOL, METERED RESPIRATORY (INHALATION) EVERY 6 HOURS PRN
Qty: 1 INHALER | Refills: 5 | Status: SHIPPED | OUTPATIENT
Start: 2020-01-10 | End: 2020-07-02

## 2020-01-10 RX ORDER — ASPIRIN 81 MG/1
81 TABLET ORAL DAILY
COMMUNITY
End: 2020-12-16

## 2020-01-10 ASSESSMENT — ENCOUNTER SYMPTOMS
COLOR CHANGE: 0
VOMITING: 0
NAUSEA: 0
PHOTOPHOBIA: 0
SHORTNESS OF BREATH: 0
BACK PAIN: 0
COUGH: 0
RHINORRHEA: 0
VOICE CHANGE: 0

## 2020-01-10 NOTE — PATIENT INSTRUCTIONS
1. Return for fasting labs in [address] within one week. 2. Cologuard ordered. 3. Follow-up in 1 month to recheck symptoms. 4. Call for worsened symptoms or concerns. 5. Keep all scheduled appointments including sleep study. 6. Stop smoking. 7. Diet and weight loss.

## 2020-01-14 ENCOUNTER — OFFICE VISIT (OUTPATIENT)
Dept: CARDIOLOGY | Age: 55
End: 2020-01-14
Payer: MEDICARE

## 2020-01-14 VITALS
SYSTOLIC BLOOD PRESSURE: 158 MMHG | HEIGHT: 68 IN | WEIGHT: 275 LBS | HEART RATE: 80 BPM | DIASTOLIC BLOOD PRESSURE: 96 MMHG | BODY MASS INDEX: 41.68 KG/M2

## 2020-01-14 PROBLEM — R07.89 OTHER CHEST PAIN: Status: RESOLVED | Noted: 2019-11-27 | Resolved: 2020-01-14

## 2020-01-14 PROCEDURE — 3017F COLORECTAL CA SCREEN DOC REV: CPT | Performed by: NURSE PRACTITIONER

## 2020-01-14 PROCEDURE — 99213 OFFICE O/P EST LOW 20 MIN: CPT | Performed by: NURSE PRACTITIONER

## 2020-01-14 PROCEDURE — G8484 FLU IMMUNIZE NO ADMIN: HCPCS | Performed by: NURSE PRACTITIONER

## 2020-01-14 PROCEDURE — G8417 CALC BMI ABV UP PARAM F/U: HCPCS | Performed by: NURSE PRACTITIONER

## 2020-01-14 PROCEDURE — 93000 ELECTROCARDIOGRAM COMPLETE: CPT | Performed by: NURSE PRACTITIONER

## 2020-01-14 PROCEDURE — 4004F PT TOBACCO SCREEN RCVD TLK: CPT | Performed by: NURSE PRACTITIONER

## 2020-01-14 PROCEDURE — G8427 DOCREV CUR MEDS BY ELIG CLIN: HCPCS | Performed by: NURSE PRACTITIONER

## 2020-01-14 RX ORDER — LOSARTAN POTASSIUM 50 MG/1
50 TABLET ORAL 2 TIMES DAILY
COMMUNITY
End: 2020-03-10 | Stop reason: SDUPTHER

## 2020-01-14 RX ORDER — AMLODIPINE BESYLATE 5 MG/1
5 TABLET ORAL 2 TIMES DAILY
Qty: 90 TABLET | Refills: 1 | Status: SHIPPED | OUTPATIENT
Start: 2020-01-14 | End: 2020-03-10 | Stop reason: SDUPTHER

## 2020-01-14 ASSESSMENT — ENCOUNTER SYMPTOMS
VOMITING: 0
TROUBLE SWALLOWING: 0
COUGH: 0
WHEEZING: 0
ABDOMINAL PAIN: 0
EYE DISCHARGE: 0
FACIAL SWELLING: 0
NAUSEA: 0
SHORTNESS OF BREATH: 1
BLOOD IN STOOL: 0
COLOR CHANGE: 0
BACK PAIN: 1
EYE REDNESS: 0
ABDOMINAL DISTENTION: 0

## 2020-01-14 NOTE — PROGRESS NOTES
[address] [place] [address] Cardiology  [address]  [address]  Phone: [phone]  Fax: [phone]    OFFICE VISIT:  2020    Keo Robbins - : 1965    Reason For Visit:  Chris Chinchilla is a 53 y.o. male who is here for 1 Month Follow-Up (BP high,SOB); Coronary Artery Disease; and Hypertension      HPI    Mr. Joseph Sands is a 53 y.o. male with history of coronary artery disease, hypertension, hyperlipidemia, nicotine dependence, a family history of CAD, and BRITNI who presents with the chief complaint of high blood pressure and dyspnea. Patient is going to have sleep study this month. States that he has phonic shortness of breath which is unchanged. Chris Chinchilla has no exertional chest pain, pressure, burning, tightness or squeezing. No symptomatic tachy- or deandra-arrhythmia. No lightheadedness, dizziness, or syncope. No numbness or weakness to suggest cerebrovascular accident or transient ischemic attack. he denies signs of bleeding. Reports no edema. He denies orthopnea or paroxysmal nocturnal dyspnea. he has been compliant with his medications. his BP has been has been running high at home stating 165/100. PCP follows lipids and labs. Patient is requesting Dr. Tre Manning to be his cardiologist.       PARVEZ Bernal is PCP.   Keo Robbins has the following history as recorded in [facility]:    Patient Active Problem List    Diagnosis Date Noted    Xanthelasma of eyelid, bilateral 01/10/2020    Coronary artery disease involving native coronary artery of native heart 2019    History of sleep apnea 2019    History of splenectomy 2019    Leukocytosis 2019    Chest discomfort 10/23/2019    Essential hypertension 2016    Hypertension     Other hyperlipidemia      Past Medical History:   Diagnosis Date    Arthritis     Asthma     Bronchitis     CAD (coronary artery disease)     Cerebral artery occlusion with cerebral infarction ([facility] [address]     Emphysema of lung (HCC)     Follicular acne     as stated per pt. \"it comes and goes. Nothing helps it. I've been trying to tell doctors i need an antiobiotic.  Hx of blood clots     DVT'S (as stated per pt)    Hypertension     Hypoglycemia     Knee joint pain     Other and unspecified hyperlipidemia     PVD (peripheral vascular disease) ([facility] [address]     Sleep apnea     pt does not use cpap or bipap. Self diagnosed. Pt has not seen a physician about this.     Smoker     x 1 pack daily     Past Surgical History:   Procedure Laterality Date    APPENDECTOMY      CARPAL TUNNEL RELEASE Right     HEMORRHOID SURGERY      OTHER SURGICAL HISTORY  11/2015    Femoral stent placement    SPINE SURGERY      SPLENECTOMY      VASCULAR SURGERY      PTA/STENT LT FEMORAL ARTERY     Family History   Problem Relation Age of Onset    Cancer Father     Heart Disease Father     Heart Surgery Father     Diabetes Father     Hypertension Mother     Hypertension Sister     Heart Disease Sister     Stroke Maternal Grandmother     Heart Disease Maternal Grandmother     Heart Disease Paternal Uncle     Heart Disease Maternal Grandfather     Stroke Maternal Grandfather      Social History     Tobacco Use    Smoking status: Current Some Day Smoker     Packs/day: 1.00     Start date: 11/26/1981    Smokeless tobacco: Never Used   Substance Use Topics    Alcohol use: No      Current Outpatient Medications   Medication Sig Dispense Refill    losartan (COZAAR) 50 MG tablet Take 50 mg by mouth 2 times daily Indications: takes one in am and one in pm      phenylephrine (RICK-SYNEPHRINE) 1 % nasal spray 1 drop by Nasal route every 4 hours as needed for Congestion      amLODIPine (NORVASC) 5 MG tablet Take 1 tablet by mouth 2 times daily 90 tablet 1    Hypromellose (ARTIFICIAL TEARS OP) Apply to eye      aspirin 81 MG EC tablet Take 81 mg by mouth daily      albuterol sulfate  (90 Base) MCG/ACT inhaler Inhale 2 puffs into the lungs discharge. Left eye: No discharge. Pupils: Pupils are equal, round, and reactive to light. Neck:      Musculoskeletal: Normal range of motion. No edema. Vascular: No carotid bruit or JVD. Trachea: No tracheal deviation. Cardiovascular:      Rate and Rhythm: Normal rate and regular rhythm. Heart sounds: Normal heart sounds. No murmur. No friction rub. No gallop. Pulmonary:      Effort: Pulmonary effort is normal. No respiratory distress. Breath sounds: No wheezing, rhonchi or rales. Abdominal:      General: Bowel sounds are normal. There is no distension. Palpations: Abdomen is soft. Tenderness: There is no tenderness. Musculoskeletal: Normal range of motion. Skin:     General: Skin is warm and dry. Capillary Refill: Capillary refill takes less than 2 seconds. Findings: No rash. Neurological:      Mental Status: He is alert and oriented to person, place, and time. Psychiatric:         Behavior: Behavior normal.         Judgment: Judgment normal.         Cardiac data:    ECG 01/14/20  Sinus rhythm with a right bundle branch block, 80 bpm    QTc 0.419 ms    12/16/2014  DSE negative for myocardial ischemia  12/16/2014  Echo  Normal LVFX  1/30/2018  Echo  Normal LVFX  10/9/2019  Echo  Normal LVFX  10/10/2019  lexiscan Positive for inferior myocardial ischemia, EF 68%, low risk findings, AUC indication 15, AUC score 4, Cali Clark MD)   10/23/19  Cath  Long 70% LAD, 80% mid circ, 100% small ND RCA, normal LVFX      Assessment, Recommendations, & Plan:  53 y.o. male with      Diagnosis Orders   1. Coronary artery disease involving native coronary artery of native heart, angina presence unspecified     2. Essential hypertension     3. Other hyperlipidemia     4. Class 3 severe obesity with body mass index (BMI) of 40.0 to 44.9 in adult, unspecified obesity type, unspecified whether serious comorbidity present ([address]     5.  Cigarette nicotine dependence with nicotine-induced disorder         1. Coronary artery disease- patient has been turned down for cardiac surgery by Dr. Marcela Valiente. Lifestyle modifications were recommended. Patient requesting to discuss further with Dr. Francesca Poole and if no options are available here, would like to be referred to [facility]. 2.  Essential hypertension-blood pressure today 160/90 in office. Patient states that it runs 063/670 diastolic at home. Will increase Norvasc to twice a day and encourage patient to keep blood pressure log. Patient will return in 1 month to evaluate. 3.  Hyperlipidemia- managed by PCP    4. Obesity- recommend discussing with your PCP an individualized, heart healthy diet and exercise program tailored for you. 5.  Nicotine dependence- patient is no interested in quitting at this time. Orders Placed This Encounter   Procedures    EKG 12 lead     Order Specific Question:   Reason for Exam?     Answer: Other     Comments:   SOB     Return in about 1 month (around 2/14/2020) for BP . Call with any questionsor concerns  Follow up with PARVEZ Jimenez for non cardiac problems  Report any new problems  Cardiovascular Fitness-Exercise as tolerated. Strive for 15 minutes of exercise most days of the week. Cardiac / HealthyDiet  Continue current medications as directed  Continue plan of treatment  It is always recommended that you bring your medicationsbottles with you to each visit - this is for your safety! Please do not hesitate to contact me for any questions or concerns. Sincerely yours,    PARVEZ Ruelas    This dictation was generated by voice recognition computer software. Although all attempts are made to edit dictation for accuracy, there may be errors in the transcription that are not intended.

## 2020-01-14 NOTE — PATIENT INSTRUCTIONS
Orders Placed This Encounter   Procedures    EKG 12 lead     Order Specific Question:   Reason for Exam?     Answer: Other     Comments:   SOB     Return in about 1 month (around 2020) for BP . Increase Norvasc to 5 mg BID. Keep BP log. Call with any questionsor concerns  Follow up with Sherren Bald, APRN for non cardiac problems  Report any new problems  Cardiovascular Fitness-Exercise as tolerated. Strive for 15 minutes of exercise most days of the week. Cardiac / HealthyDiet  Continue current medications as directed  Continue plan of treatment  It is always recommended that you bring your medicationsbottles with you to each visit - this is for your safety! Encouraged to  Call the [address] [place] [address] 5-108-QUIT-NOW for additional information such as special tools, a support team of coaches, research-based information, and a community of others trying to become tobacco free. Expert coaches can talk to you about overcoming common barriers, such as dealing with stress, fighting cravings, coping with irritability, and controlling weight gain. BENEFITS OF STOPPING SMOKIN minutes after quitting - Your heart rate and blood pressure drop. 12 hours after quitting - The carbon monoxide level in your blood drops to normal.  2 weeks to 3 months after quitting - Your circulation improves and your lung function increases. 1 to 9 months after quitting - Coughing and shortness of breath decrease; cilia (tiny hair-like structures that move mucus out of the lungs) start to regain normal function in the lungs, increasing the ability to handle mucus, clean the lungs, and reduce the risk of infection. 1 year after quitting - The excess risk of coronary heart disease is half that of a continuing smoker. 5 years after quitting - Risk of cancer of the mouth, throat, esophagus, and bladder are cut in half. Cervical cancer risk falls to that of a non-smoker.  Stroke risk can fall to that of a non-smoker after 2-5 years. 10 years after quitting - The risk of dying from lung cancer is about half that of a person who is still smoking. The risk of cancer of the larynx (voice box) and pancreas decreases.

## 2020-01-15 ENCOUNTER — TELEPHONE (OUTPATIENT)
Dept: PRIMARY CARE CLINIC | Age: 55
End: 2020-01-15

## 2020-01-15 RX ORDER — METHYLPREDNISOLONE 4 MG/1
TABLET ORAL
Qty: 1 KIT | Refills: 0 | Status: SHIPPED | OUTPATIENT
Start: 2020-01-15 | End: 2020-01-21

## 2020-01-15 RX ORDER — FLUTICASONE PROPIONATE 50 MCG
1 SPRAY, SUSPENSION (ML) NASAL DAILY
Qty: 2 BOTTLE | Refills: 1 | Status: SHIPPED | OUTPATIENT
Start: 2020-01-15 | End: 2020-03-10 | Stop reason: SDUPTHER

## 2020-01-17 ENCOUNTER — TELEPHONE (OUTPATIENT)
Dept: CARDIOLOGY | Age: 55
End: 2020-01-17

## 2020-01-17 NOTE — TELEPHONE ENCOUNTER
Patient returned my call and he had to leave message for me to call back-I called him and reached him giving him the appt date and time 2-6-20 at 3:00pm and informed him what to take and also gave the address and phone number to the office. I also told him that he could keep the appt with  if he chose. Patient voiced understanding. [facility]

## 2020-01-24 ENCOUNTER — TELEPHONE (OUTPATIENT)
Dept: PRIMARY CARE CLINIC | Age: 55
End: 2020-01-24

## 2020-02-03 DIAGNOSIS — I10 ESSENTIAL HYPERTENSION: ICD-10-CM

## 2020-02-03 LAB
ALBUMIN SERPL-MCNC: 4.1 G/DL (ref 3.5–5.2)
ALP BLD-CCNC: 101 U/L (ref 40–130)
ALT SERPL-CCNC: 25 U/L (ref 5–41)
ANION GAP SERPL CALCULATED.3IONS-SCNC: 16 MMOL/L (ref 7–19)
AST SERPL-CCNC: 16 U/L (ref 5–40)
BASOPHILS ABSOLUTE: 0.1 K/UL (ref 0–0.2)
BASOPHILS RELATIVE PERCENT: 0.8 % (ref 0–1)
BILIRUB SERPL-MCNC: <0.2 MG/DL (ref 0.2–1.2)
BUN BLDV-MCNC: 19 MG/DL (ref 6–20)
CALCIUM SERPL-MCNC: 10.1 MG/DL (ref 8.6–10)
CHLORIDE BLD-SCNC: 101 MMOL/L (ref 98–111)
CHOLESTEROL, TOTAL: 213 MG/DL (ref 160–199)
CO2: 21 MMOL/L (ref 22–29)
CREAT SERPL-MCNC: 0.9 MG/DL (ref 0.5–1.2)
EOSINOPHILS ABSOLUTE: 1.1 K/UL (ref 0–0.6)
EOSINOPHILS RELATIVE PERCENT: 7.4 % (ref 0–5)
GFR NON-AFRICAN AMERICAN: >60
GLUCOSE BLD-MCNC: 103 MG/DL (ref 74–109)
HBA1C MFR BLD: 6.5 % (ref 4–6)
HCT VFR BLD CALC: 41.5 % (ref 42–52)
HDLC SERPL-MCNC: 29 MG/DL (ref 55–121)
HEMOGLOBIN: 13.5 G/DL (ref 14–18)
IMMATURE GRANULOCYTES #: 0.1 K/UL
LDL CHOLESTEROL CALCULATED: 130 MG/DL
LYMPHOCYTES ABSOLUTE: 3.7 K/UL (ref 1.1–4.5)
LYMPHOCYTES RELATIVE PERCENT: 25.9 % (ref 20–40)
MCH RBC QN AUTO: 29.8 PG (ref 27–31)
MCHC RBC AUTO-ENTMCNC: 32.5 G/DL (ref 33–37)
MCV RBC AUTO: 91.6 FL (ref 80–94)
MONOCYTES ABSOLUTE: 1.8 K/UL (ref 0–0.9)
MONOCYTES RELATIVE PERCENT: 12.7 % (ref 0–10)
NEUTROPHILS ABSOLUTE: 7.6 K/UL (ref 1.5–7.5)
NEUTROPHILS RELATIVE PERCENT: 52.9 % (ref 50–65)
PDW BLD-RTO: 13.2 % (ref 11.5–14.5)
PLATELET # BLD: 463 K/UL (ref 130–400)
PMV BLD AUTO: 9.7 FL (ref 9.4–12.4)
POTASSIUM SERPL-SCNC: 4.1 MMOL/L (ref 3.5–5)
PROSTATE SPECIFIC ANTIGEN: 0.93 NG/ML (ref 0–4)
RBC # BLD: 4.53 M/UL (ref 4.7–6.1)
SODIUM BLD-SCNC: 138 MMOL/L (ref 136–145)
TOTAL PROTEIN: 7.8 G/DL (ref 6.6–8.7)
TRIGL SERPL-MCNC: 272 MG/DL (ref 0–149)
TSH SERPL DL<=0.05 MIU/L-ACNC: 2.5 UIU/ML (ref 0.27–4.2)
WBC # BLD: 14.4 K/UL (ref 4.8–10.8)

## 2020-02-10 ENCOUNTER — OFFICE VISIT (OUTPATIENT)
Dept: PRIMARY CARE CLINIC | Age: 55
End: 2020-02-10
Payer: MEDICARE

## 2020-02-10 VITALS
OXYGEN SATURATION: 98 % | WEIGHT: 276 LBS | DIASTOLIC BLOOD PRESSURE: 70 MMHG | HEART RATE: 105 BPM | HEIGHT: 68 IN | SYSTOLIC BLOOD PRESSURE: 110 MMHG | TEMPERATURE: 97.8 F | BODY MASS INDEX: 41.83 KG/M2

## 2020-02-10 PROCEDURE — 4004F PT TOBACCO SCREEN RCVD TLK: CPT | Performed by: NURSE PRACTITIONER

## 2020-02-10 PROCEDURE — 3017F COLORECTAL CA SCREEN DOC REV: CPT | Performed by: NURSE PRACTITIONER

## 2020-02-10 PROCEDURE — G8427 DOCREV CUR MEDS BY ELIG CLIN: HCPCS | Performed by: NURSE PRACTITIONER

## 2020-02-10 PROCEDURE — G8417 CALC BMI ABV UP PARAM F/U: HCPCS | Performed by: NURSE PRACTITIONER

## 2020-02-10 PROCEDURE — G8484 FLU IMMUNIZE NO ADMIN: HCPCS | Performed by: NURSE PRACTITIONER

## 2020-02-10 PROCEDURE — 99214 OFFICE O/P EST MOD 30 MIN: CPT | Performed by: NURSE PRACTITIONER

## 2020-02-10 RX ORDER — METOPROLOL SUCCINATE 50 MG/1
50 TABLET, EXTENDED RELEASE ORAL DAILY
Qty: 30 TABLET | Refills: 3 | Status: SHIPPED | OUTPATIENT
Start: 2020-02-10 | End: 2020-05-29

## 2020-02-10 ASSESSMENT — ENCOUNTER SYMPTOMS
VOMITING: 0
PHOTOPHOBIA: 0
VOICE CHANGE: 0
COUGH: 0
SHORTNESS OF BREATH: 0
COLOR CHANGE: 0
RHINORRHEA: 0
BACK PAIN: 0
NAUSEA: 0

## 2020-02-10 NOTE — PROGRESS NOTES
[facility] PRIMARY CARE  [address] [address]  [address] [address]  Dept: [phone]  Dept Fax: [phone]  Loc: [phone]    Katrina Doctor is a 53 y.o. male who presents today for his medical conditions/complaints as noted below. Katrina Doctor is c/o of 1 Month Follow-Up (obesity,); Hypertension; and Back Pain ()      HPI:     HPI   Chief Complaint   Patient presents with    1 Month Follow-Up     obesity,    Hypertension    Back Pain          Patient presents today for follow-up hypertension and obesity. Patient states he has been trying to make lifestyle changes. Blood pressure is within normal range here today, however, he states at home it is always higher. His heart rate remains elevated. He states resting HR is usually around 100 or higher. He admits that he would like to stop smoking, but unsure if chantix will make him sick as he thinks it did in the past. He does have an A1C of 6.5; he is not on metformin. He sees cardiology and was recently referred to [facility] for consideration of stents; however, they did not find it necessary neither did our local cardiology. He was advised on lifestyle modifications. Past Medical History:   Diagnosis Date    Arthritis     Asthma     Bronchitis     CAD (coronary artery disease)     Cerebral artery occlusion with cerebral infarction (HCC)     Emphysema of lung (HCC)     Follicular acne     as stated per pt. \"it comes and goes. Nothing helps it. I've been trying to tell doctors i need an antiobiotic.  Hx of blood clots     DVT'S (as stated per pt)    Hypertension     Hypoglycemia     Knee joint pain     Other and unspecified hyperlipidemia     PVD (peripheral vascular disease) ([facility] [address]     Sleep apnea     pt does not use cpap or bipap. Self diagnosed. Pt has not seen a physician about this.     Smoker     x 1 pack daily      Past Surgical History:   Procedure Laterality Date    APPENDECTOMY      CARPAL TUNNEL RELEASE Right     HEMORRHOID SURGERY      OTHER SURGICAL HISTORY  11/2015    Femoral stent placement    SPINE SURGERY      SPLENECTOMY      VASCULAR SURGERY      PTA/STENT LT FEMORAL ARTERY       Vitals 2/10/2020 1/14/2020 1/14/2020 1/10/2020 12/17/2019 95/39/2309   SYSTOLIC 717 420 [phone] 094   DIASTOLIC 70 96 90 62 900 821   Site - - - Left Upper Arm - -   Position - - - Sitting - -   Pulse 105 - 80 85 - 102   Temp 97.8 - - 98.2 - -   Resp - - - - - -   SpO2 98 - - 95 - 97   Weight 276 lb - 275 lb 276 lb - 274 lb   Height 5' 8\" - 5' 8\" 5' 8\" - 5' 8\"   BMI (wt*703/ht~2) 41.96 kg/m2 - 41.81 kg/m2 41.96 kg/m2 - 41.66 kg/m2   Pain Level - - - - - -   Some recent data might be hidden       Family History   Problem Relation Age of Onset    Cancer Father     Heart Disease Father     Heart Surgery Father     Diabetes Father     Hypertension Mother     Hypertension Sister     Heart Disease Sister     Stroke Maternal Grandmother     Heart Disease Maternal Grandmother     Heart Disease Paternal Uncle     Heart Disease Maternal Grandfather     Stroke Maternal Grandfather        Social History     Tobacco Use    Smoking status: Current Every Day Smoker     Packs/day: 1.00     Years: 38.00     Pack years: 38.00     Start date: 11/26/1981    Smokeless tobacco: Never Used   Substance Use Topics    Alcohol use: No      Current Outpatient Medications   Medication Sig Dispense Refill    metoprolol succinate (TOPROL XL) 50 MG extended release tablet Take 1 tablet by mouth daily 30 tablet 3    metFORMIN (GLUCOPHAGE) 500 MG tablet Take 1 tablet by mouth 2 times daily (with meals) 60 tablet 0    fluticasone (FLONASE) 50 MCG/ACT nasal spray 1 spray by Each Nostril route daily 2 Bottle 1    losartan (COZAAR) 50 MG tablet Take 50 mg by mouth 2 times daily Indications: takes one in am and one in pm      phenylephrine (RICK-SYNEPHRINE) 1 % nasal spray 1 drop by Nasal route every 4 hours as needed for Congestion      amLODIPine (NORVASC) 5 MG tablet Take 1 tablet by mouth 2 times daily 90 tablet 1    Hypromellose (ARTIFICIAL TEARS OP) Apply to eye      aspirin 81 MG EC tablet Take 81 mg by mouth daily      albuterol sulfate  (90 Base) MCG/ACT inhaler Inhale 2 puffs into the lungs every 6 hours as needed for Wheezing 1 Inhaler 5    isosorbide mononitrate (IMDUR) 30 MG extended release tablet Take 1 tablet by mouth nightly 30 tablet 1    pregabalin (LYRICA) 50 MG capsule Take 50 mg by mouth 2 times daily. No current facility-administered medications for this visit. Allergies   Allergen Reactions    Iodine      Iodine containing multi vitamin    Lisinopril      cough    Shellfish-Derived Products Swelling       Health Maintenance   Topic Date Due    Hepatitis C screen  1965    Meningococcal (ACWY) vaccine (1 - Risk start before 7 months 4-dose series) 1964    Hib vaccine (1 of 1 - Risk 1-dose series) 08/25/1965    Pneumococcal 0-64 years Vaccine (1 of 3 - PCV13) 05/25/1970    Diabetic foot exam  05/25/1974    Diabetic retinal exam  05/25/1974    Meningococcal B vaccine (1 of 2 - Risk Bexsero 2-dose series) 05/25/1974    DTaP/Tdap/Td vaccine (1 - Tdap) 05/25/1975    HIV screen  05/25/1979    Diabetic microalbuminuria test  05/25/1982    Shingles Vaccine (1 of 2) 05/25/2014    Colon cancer screen colonoscopy  05/25/2014    Low dose CT lung screening  05/25/2019    Annual Wellness Visit (AWV)  06/23/2019    Flu vaccine (1) 09/01/2019    A1C test (Diabetic or Prediabetic)  02/03/2021    Lipid screen  02/03/2021    Potassium monitoring  02/03/2021    Creatinine monitoring  02/03/2021    Hepatitis A vaccine  Aged Out    Hepatitis B vaccine  Aged Out       Subjective:      Review of Systems   Constitutional: Negative for chills and fever. HENT: Negative for ear pain, hearing loss, rhinorrhea and voice change.     Eyes: Negative for photophobia achieve these goals please remember to bring all medications, herbal products, and over the counter supplements with you to each visit. If your provider has ordered testing for you, please be sure to follow up with our office if you have not received results within 7 days after the testing took place. *If you receive a survey after visiting one of our offices, please take time to share your experience concerning your physician office visit. These surveys are confidential and no health information about you is shared. We are eager to improve for you and we are counting on your feedback to help make that happen. Electronically signed by PARVEZ Wheeler on 2/10/2020 at 2:03 PM    EMR Dragon/transcription disclaimer:  Much of thisencounter note is electronic transcription/translation of spoken language to printed texts. The electronic translation of spoken language may be erroneous, or at times, nonsensical words or phrases may be inadvertentlytranscribed.   Although I have reviewed the note for such errors, some may still exist.

## 2020-02-11 ENCOUNTER — HOSPITAL ENCOUNTER (OUTPATIENT)
Age: 55
Setting detail: OUTPATIENT SURGERY
Discharge: HOME | End: 2020-02-11
Attending: PHYSICAL MEDICINE & REHABILITATION | Admitting: PHYSICAL MEDICINE & REHABILITATION

## 2020-02-11 ENCOUNTER — HOSPITAL ENCOUNTER (OUTPATIENT)
Dept: GENERAL RADIOLOGY | Age: 55
Discharge: HOME | End: 2020-02-11
Payer: MEDICARE

## 2020-02-11 VITALS
RESPIRATION RATE: 16 BRPM | DIASTOLIC BLOOD PRESSURE: 63 MMHG | SYSTOLIC BLOOD PRESSURE: 146 MMHG | HEART RATE: 91 BPM | OXYGEN SATURATION: 95 %

## 2020-02-11 PROCEDURE — G8918 PT W/O PREOP ORDER IV AB PRO: HCPCS

## 2020-02-11 PROCEDURE — G8907 PT DOC NO EVENTS ON DISCHARG: HCPCS

## 2020-02-11 PROCEDURE — 3209999900 FLUORO FOR SURGICAL PROCEDURES

## 2020-02-11 PROCEDURE — 62323 NJX INTERLAMINAR LMBR/SAC: CPT

## 2020-02-11 RX ORDER — METHYLPREDNISOLONE ACETATE 80 MG/ML
INJECTION, SUSPENSION INTRA-ARTICULAR; INTRALESIONAL; INTRAMUSCULAR; SOFT TISSUE PRN
Status: DISCONTINUED | OUTPATIENT
Start: 2020-02-11 | End: 2020-02-11 | Stop reason: ALTCHOICE

## 2020-02-11 RX ORDER — SODIUM CHLORIDE 9 MG/ML
INJECTION INTRAVENOUS PRN
Status: DISCONTINUED | OUTPATIENT
Start: 2020-02-11 | End: 2020-02-11 | Stop reason: ALTCHOICE

## 2020-02-11 RX ORDER — LIDOCAINE HYDROCHLORIDE 10 MG/ML
INJECTION, SOLUTION INFILTRATION; PERINEURAL PRN
Status: DISCONTINUED | OUTPATIENT
Start: 2020-02-11 | End: 2020-02-11 | Stop reason: ALTCHOICE

## 2020-02-11 ASSESSMENT — PAIN SCALES - GENERAL: PAINLEVEL_OUTOF10: 0

## 2020-02-11 NOTE — INTERVAL H&P NOTE
H&P Update         Patient examined. There has been no change.     Electronically signed by Cherrie Thornton on 2/11/20 at 9:42 AM

## 2020-02-24 RX ORDER — ISOSORBIDE MONONITRATE 30 MG/1
TABLET, EXTENDED RELEASE ORAL
Qty: 30 TABLET | Refills: 5 | Status: SHIPPED | OUTPATIENT
Start: 2020-02-24 | End: 2020-08-05

## 2020-03-10 ENCOUNTER — OFFICE VISIT (OUTPATIENT)
Dept: PRIMARY CARE CLINIC | Age: 55
End: 2020-03-10
Payer: MEDICARE

## 2020-03-10 VITALS
HEART RATE: 78 BPM | DIASTOLIC BLOOD PRESSURE: 72 MMHG | TEMPERATURE: 98.1 F | RESPIRATION RATE: 20 BRPM | HEIGHT: 68 IN | SYSTOLIC BLOOD PRESSURE: 138 MMHG | BODY MASS INDEX: 39.56 KG/M2 | OXYGEN SATURATION: 97 % | WEIGHT: 261 LBS

## 2020-03-10 PROCEDURE — 4004F PT TOBACCO SCREEN RCVD TLK: CPT | Performed by: NURSE PRACTITIONER

## 2020-03-10 PROCEDURE — G8417 CALC BMI ABV UP PARAM F/U: HCPCS | Performed by: NURSE PRACTITIONER

## 2020-03-10 PROCEDURE — 3017F COLORECTAL CA SCREEN DOC REV: CPT | Performed by: NURSE PRACTITIONER

## 2020-03-10 PROCEDURE — G8427 DOCREV CUR MEDS BY ELIG CLIN: HCPCS | Performed by: NURSE PRACTITIONER

## 2020-03-10 PROCEDURE — G8484 FLU IMMUNIZE NO ADMIN: HCPCS | Performed by: NURSE PRACTITIONER

## 2020-03-10 PROCEDURE — 99213 OFFICE O/P EST LOW 20 MIN: CPT | Performed by: NURSE PRACTITIONER

## 2020-03-10 RX ORDER — PREGABALIN 50 MG/1
50 CAPSULE ORAL 2 TIMES DAILY
Qty: 60 CAPSULE | Refills: 3 | Status: SHIPPED | OUTPATIENT
Start: 2020-03-10 | End: 2020-08-05

## 2020-03-10 RX ORDER — AMLODIPINE BESYLATE 5 MG/1
5 TABLET ORAL 2 TIMES DAILY
Qty: 90 TABLET | Refills: 1 | Status: SHIPPED | OUTPATIENT
Start: 2020-03-10 | End: 2020-06-17

## 2020-03-10 RX ORDER — FLUTICASONE PROPIONATE 50 MCG
1 SPRAY, SUSPENSION (ML) NASAL DAILY
Qty: 2 BOTTLE | Refills: 1 | Status: SHIPPED | OUTPATIENT
Start: 2020-03-10 | End: 2020-06-17

## 2020-03-10 RX ORDER — LOSARTAN POTASSIUM 50 MG/1
50 TABLET ORAL 2 TIMES DAILY
Qty: 60 TABLET | Refills: 3 | Status: SHIPPED | OUTPATIENT
Start: 2020-03-10 | End: 2020-07-02

## 2020-03-10 ASSESSMENT — ENCOUNTER SYMPTOMS
BACK PAIN: 0
VOICE CHANGE: 0
VOMITING: 0
COUGH: 0
RHINORRHEA: 0
PHOTOPHOBIA: 0
COLOR CHANGE: 0
SHORTNESS OF BREATH: 0
NAUSEA: 0

## 2020-03-10 NOTE — PROGRESS NOTES
INJECTION L3-4 performed by Deepak Guzman at [address] VASCULAR SURGERY      PTA/STENT LT FEMORAL ARTERY       Vitals 3/10/2020 2/11/2020 2/11/2020 2/10/2020 1/14/2020 2/20/9750   SYSTOLIC 676 475 421 868 608 125   DIASTOLIC 72 63 84 70 96 90   Site - - - - - -   Position - - - - - -   Pulse 78 91 90 105 - 80   Temp 98.1 - - 97.8 - -   Resp 20 16 18 - - -   SpO2 97 95 96 98 - -   Weight 261 lb - - 276 lb - 275 lb   Height 5' 8\" - - 5' 8\" - 5' 8\"   BMI (wt*703/ht~2) 39.68 kg/m2 - - 41.96 kg/m2 - 41.81 kg/m2   Pain Level - 0 - - - -   Some recent data might be hidden       Family History   Problem Relation Age of Onset    Cancer Father     Heart Disease Father     Heart Surgery Father     Diabetes Father     Hypertension Mother     Hypertension Sister     Heart Disease Sister     Stroke Maternal Grandmother     Heart Disease Maternal Grandmother     Heart Disease Paternal Uncle     Heart Disease Maternal Grandfather     Stroke Maternal Grandfather        Social History     Tobacco Use    Smoking status: Current Every Day Smoker     Packs/day: 1.00     Years: 38.00     Pack years: 38.00     Start date: 11/26/1981    Smokeless tobacco: Never Used   Substance Use Topics    Alcohol use: No      Current Outpatient Medications   Medication Sig Dispense Refill    metFORMIN (GLUCOPHAGE) 500 MG tablet Take 1 tablet by mouth 2 times daily (with meals) 60 tablet 0    amLODIPine (NORVASC) 5 MG tablet Take 1 tablet by mouth 2 times daily 90 tablet 1    losartan (COZAAR) 50 MG tablet Take 1 tablet by mouth 2 times daily Indications: takes one in am and one in pm 60 tablet 3    pregabalin (LYRICA) 50 MG capsule Take 1 capsule by mouth 2 times daily for 30 days.  60 capsule 3    fluticasone (FLONASE) 50 MCG/ACT nasal spray 1 spray by Each Nostril route daily 2 Bottle 1    isosorbide mononitrate (IMDUR) 30 MG extended release tablet TAKE ONE TABLET BY MOUTH EVERY NIGHT 30 tablet 5    metoprolol succinate (TOPROL XL) 50 MG extended release tablet Take 1 tablet by mouth daily 30 tablet 3    phenylephrine (RICK-SYNEPHRINE) 1 % nasal spray 1 drop by Nasal route every 4 hours as needed for Congestion      Hypromellose (ARTIFICIAL TEARS OP) Apply to eye      aspirin 81 MG EC tablet Take 81 mg by mouth daily      albuterol sulfate  (90 Base) MCG/ACT inhaler Inhale 2 puffs into the lungs every 6 hours as needed for Wheezing 1 Inhaler 5     No current facility-administered medications for this visit. Allergies   Allergen Reactions    Iodine      Iodine containing multi vitamin    Lisinopril      cough    Shellfish-Derived Products Swelling       Health Maintenance   Topic Date Due    Hepatitis C screen  1965    Meningococcal (ACWY) vaccine (1 - Risk start before 7 months 4-dose series) 1964    Hib vaccine (1 of 1 - Risk 1-dose series) 08/25/1965    Pneumococcal 0-64 years Vaccine (1 of 3 - PCV13) 05/25/1970    Diabetic foot exam  05/25/1974    Diabetic retinal exam  05/25/1974    Meningococcal B vaccine (1 of 2 - Increased Risk Bexsero 2-dose series) 05/25/1974    HIV screen  05/25/1979    Diabetic microalbuminuria test  05/25/1982    DTaP/Tdap/Td vaccine (1 - Tdap) 05/25/1983    Shingles Vaccine (1 of 2) 05/25/2014    Colon cancer screen colonoscopy  05/25/2014    Low dose CT lung screening  05/25/2019    Annual Wellness Visit (AWV)  06/23/2019    Flu vaccine (1) 09/01/2019    A1C test (Diabetic or Prediabetic)  02/03/2021    Lipid screen  02/03/2021    Potassium monitoring  02/03/2021    Creatinine monitoring  02/03/2021    Hepatitis A vaccine  Aged Out    Hepatitis B vaccine  Aged Out       Subjective:      Review of Systems   Constitutional: Negative for chills and fever. HENT: Negative for ear pain, hearing loss, rhinorrhea and voice change. Eyes: Negative for photophobia and visual disturbance.    Respiratory: Negative for cough and shortness of breath. Cardiovascular: Negative for chest pain and palpitations. Gastrointestinal: Negative for nausea and vomiting. Endocrine: Negative. Negative for cold intolerance and heat intolerance. Genitourinary: Negative for difficulty urinating and flank pain. Musculoskeletal: Negative for back pain and neck pain. Skin: Negative for color change and rash. Allergic/Immunologic: Negative for environmental allergies and food allergies. Neurological: Negative for dizziness, speech difficulty and headaches. Hematological: Does not bruise/bleed easily. Psychiatric/Behavioral: Negative for sleep disturbance and suicidal ideas. Objective:     Physical Exam  Vitals signs and nursing note reviewed. Constitutional:       Appearance: He is well-developed. HENT:      Head: Atraumatic. Right Ear: External ear normal.      Left Ear: External ear normal.      Nose: Nose normal.   Eyes:      Conjunctiva/sclera: Conjunctivae normal.      Pupils: Pupils are equal, round, and reactive to light. Neck:      Musculoskeletal: Normal range of motion and neck supple. Cardiovascular:      Rate and Rhythm: Normal rate and regular rhythm. Heart sounds: Normal heart sounds, S1 normal and S2 normal.   Pulmonary:      Effort: Pulmonary effort is normal.      Breath sounds: Normal breath sounds. Abdominal:      General: Bowel sounds are normal.      Palpations: Abdomen is soft. Musculoskeletal: Normal range of motion. Skin:     General: Skin is warm and dry. Neurological:      Mental Status: He is alert and oriented to person, place, and time. Psychiatric:         Behavior: Behavior normal.       /72   Pulse 78   Temp 98.1 °F (36.7 °C) (Temporal)   Resp 20   Ht 5' 8\" (1.727 m)   Wt 261 lb (118.4 kg)   SpO2 97%   BMI 39.68 kg/m²     Assessment:       Diagnosis Orders   1. Essential hypertension  Comprehensive Metabolic Panel    Hemoglobin A1C   2.  Other chronic pain results within 7 days after the testing took place. *If you receive a survey after visiting one of our offices, please take time to share your experience concerning your physician office visit. These surveys are confidential and no health information about you is shared. We are eager to improve for you and we are counting on your feedback to help make that happen. Electronically signed by PARVEZ Milton on 3/10/2020 at 4:12 PM    EMR Dragon/transcription disclaimer:  Much of thisencounter note is electronic transcription/translation of spoken language to printed texts. The electronic translation of spoken language may be erroneous, or at times, nonsensical words or phrases may be inadvertentlytranscribed.   Although I have reviewed the note for such errors, some may still exist.

## 2020-03-18 ENCOUNTER — TELEPHONE (OUTPATIENT)
Dept: PRIMARY CARE CLINIC | Age: 55
End: 2020-03-18

## 2020-03-18 NOTE — TELEPHONE ENCOUNTER
Called patient and he is using tylenol and cold and flu along with his flonase and astelin  Reports no fever, clear drainage,no shortness of breath  Harsh non productive cough and has had symptoms off and on since the last office visit (3-10)

## 2020-03-18 NOTE — TELEPHONE ENCOUNTER
Patient called,was seen in office last week and now has had sinus pressure,\"stopped up\" and cough. Denies fever. Declined appointment,request [place] to send something in to pharmacy \"I know I got it when I was there because I am pretty much shut in and don't go anywhere but there. \"

## 2020-03-18 NOTE — TELEPHONE ENCOUNTER
Okay to send tessalon 100 mg TID for cough; continue supportive therapy such as OTC cold medicine, antihistamines. F/U for worsened symptoms.

## 2020-03-19 RX ORDER — BENZONATATE 100 MG/1
100 CAPSULE ORAL 3 TIMES DAILY PRN
Qty: 30 CAPSULE | Refills: 0 | Status: SHIPPED | OUTPATIENT
Start: 2020-03-19 | End: 2020-03-26

## 2020-04-17 ENCOUNTER — VIRTUAL VISIT (OUTPATIENT)
Dept: PRIMARY CARE CLINIC | Age: 55
End: 2020-04-17
Payer: MEDICARE

## 2020-04-17 PROCEDURE — G8427 DOCREV CUR MEDS BY ELIG CLIN: HCPCS | Performed by: NURSE PRACTITIONER

## 2020-04-17 PROCEDURE — 99214 OFFICE O/P EST MOD 30 MIN: CPT | Performed by: NURSE PRACTITIONER

## 2020-04-17 PROCEDURE — G8417 CALC BMI ABV UP PARAM F/U: HCPCS | Performed by: NURSE PRACTITIONER

## 2020-04-17 PROCEDURE — 4004F PT TOBACCO SCREEN RCVD TLK: CPT | Performed by: NURSE PRACTITIONER

## 2020-04-17 PROCEDURE — 3017F COLORECTAL CA SCREEN DOC REV: CPT | Performed by: NURSE PRACTITIONER

## 2020-04-17 RX ORDER — FUROSEMIDE 20 MG/1
20 TABLET ORAL DAILY
Qty: 30 TABLET | Refills: 0 | Status: SHIPPED | OUTPATIENT
Start: 2020-04-17 | End: 2020-05-27

## 2020-04-17 ASSESSMENT — ENCOUNTER SYMPTOMS
VOICE CHANGE: 0
SHORTNESS OF BREATH: 0
NAUSEA: 0
COLOR CHANGE: 0
COUGH: 0
VOMITING: 0
PHOTOPHOBIA: 0
RHINORRHEA: 0
BACK PAIN: 0

## 2020-04-17 NOTE — PROGRESS NOTES
Poor short term memory  [] Poor long term memory    [] OTHER:      Due to this being a TeleHealth encounter, evaluation of the following organ systems is limited: Vitals/Constitutional/EENT/Resp/CV/GI//MS/Neuro/Skin/Heme-Lymph-Imm. ASSESSMENT/PLAN:  1. Shortness of breath  - Discussed with patient that he will need to monitor weight and blood pressure. I would like a BMP on him in 2 weeks; he is timid about coming in for labs. I am going to check into utilizing home health for lab draw. I would like to ensure patient's safety during covid19. If he has any increasing SOB, he is to call or go to ED.   - furosemide (LASIX) 20 MG tablet; Take 1 tablet by mouth daily  Dispense: 30 tablet; Refill: 0    2. Essential hypertension  - Continue current meds. Monitor BP. Return in about 2 weeks (around 5/1/2020) for video visit fluid overload. An  electronic signature was used to authenticate this note. --PARVEZ White on 4/17/2020 at 9:27 AM        Pursuant to the emergency declaration under the [facility] waiver authority and the [employer] and [employer] Act, this Virtual  Visit was conducted, with patient's consent, to reduce the patient's risk of exposure to COVID-19 and provide continuity of care for an established patient. Services were provided through a video synchronous discussion virtually to substitute for in-person clinic visit.

## 2020-05-01 ENCOUNTER — TELEPHONE (OUTPATIENT)
Dept: PRIMARY CARE CLINIC | Age: 55
End: 2020-05-01

## 2020-05-01 ENCOUNTER — VIRTUAL VISIT (OUTPATIENT)
Dept: PRIMARY CARE CLINIC | Age: 55
End: 2020-05-01
Payer: MEDICARE

## 2020-05-01 VITALS — SYSTOLIC BLOOD PRESSURE: 130 MMHG | DIASTOLIC BLOOD PRESSURE: 84 MMHG

## 2020-05-01 PROCEDURE — 4004F PT TOBACCO SCREEN RCVD TLK: CPT | Performed by: NURSE PRACTITIONER

## 2020-05-01 PROCEDURE — 3023F SPIROM DOC REV: CPT | Performed by: NURSE PRACTITIONER

## 2020-05-01 PROCEDURE — G8926 SPIRO NO PERF OR DOC: HCPCS | Performed by: NURSE PRACTITIONER

## 2020-05-01 PROCEDURE — G8427 DOCREV CUR MEDS BY ELIG CLIN: HCPCS | Performed by: NURSE PRACTITIONER

## 2020-05-01 PROCEDURE — 99214 OFFICE O/P EST MOD 30 MIN: CPT | Performed by: NURSE PRACTITIONER

## 2020-05-01 PROCEDURE — 3017F COLORECTAL CA SCREEN DOC REV: CPT | Performed by: NURSE PRACTITIONER

## 2020-05-01 PROCEDURE — G8417 CALC BMI ABV UP PARAM F/U: HCPCS | Performed by: NURSE PRACTITIONER

## 2020-05-01 RX ORDER — ALBUTEROL SULFATE 2.5 MG/3ML
2.5 SOLUTION RESPIRATORY (INHALATION) 4 TIMES DAILY
Qty: 120 EACH | Refills: 3 | Status: SHIPPED | OUTPATIENT
Start: 2020-05-01 | End: 2020-10-01 | Stop reason: SDUPTHER

## 2020-05-01 ASSESSMENT — ENCOUNTER SYMPTOMS
BACK PAIN: 0
VOICE CHANGE: 0
PHOTOPHOBIA: 0
COLOR CHANGE: 0
RHINORRHEA: 0
VOMITING: 0
NAUSEA: 0

## 2020-05-01 NOTE — PROGRESS NOTES
PARVEZ Amaro   amLODIPine (NORVASC) 5 MG tablet Take 1 tablet by mouth 2 times daily  PARVEZ Amaro   losartan (COZAAR) 50 MG tablet Take 1 tablet by mouth 2 times daily Indications: takes one in am and one in pm  PARVEZ Amaro   pregabalin (LYRICA) 50 MG capsule Take 1 capsule by mouth 2 times daily for 30 days. PARVEZ Amaro   fluticasone (FLONASE) 50 MCG/ACT nasal spray 1 spray by Each Nostril route daily  PARVEZ Amaro   isosorbide mononitrate (IMDUR) 30 MG extended release tablet TAKE ONE TABLET BY MOUTH EVERY NIGHT  PARVEZ Cornejo   metoprolol succinate (TOPROL XL) 50 MG extended release tablet Take 1 tablet by mouth daily  PARVEZ Amaro   phenylephrine (RICK-SYNEPHRINE) 1 % nasal spray 1 drop by Nasal route every 4 hours as needed for Congestion  Historical Provider, MD   Hypromellose (ARTIFICIAL TEARS OP) Apply to eye  Historical Provider, MD   aspirin 81 MG EC tablet Take 81 mg by mouth daily  Historical Provider, MD   albuterol sulfate  (90 Base) MCG/ACT inhaler Inhale 2 puffs into the lungs every 6 hours as needed for Wheezing  PARVEZ Amaro       Social History     Tobacco Use    Smoking status: Current Every Day Smoker     Packs/day: 1.00     Years: 38.00     Pack years: 38.00     Start date: 11/26/1981    Smokeless tobacco: Never Used   Substance Use Topics    Alcohol use: No    Drug use: Not Currently        Allergies   Allergen Reactions    Iodine      Iodine containing multi vitamin    Lisinopril      cough    Shellfish-Derived Products Swelling   ,   Past Medical History:   Diagnosis Date    Arthritis     Asthma     Bronchitis     CAD (coronary artery disease)     Cerebral artery occlusion with cerebral infarction ([address]     Emphysema of lung ([facility] [address]     Follicular acne     as stated per pt. \"it comes and goes. Nothing helps it. I've been trying to tell doctors i need an antiobiotic.     Hx of blood clots DVT'S (as stated per pt)    Hypertension     Hypoglycemia     Knee joint pain     Other and unspecified hyperlipidemia     PVD (peripheral vascular disease) ([facility] [address]     Sleep apnea     pt does not use cpap or bipap. Self diagnosed. Pt has not seen a physician about this.     Smoker     x 1 pack daily   ,   Past Surgical History:   Procedure Laterality Date    APPENDECTOMY      CARPAL TUNNEL RELEASE Right     HEMORRHOID SURGERY      OTHER SURGICAL HISTORY  11/2015    Femoral stent placement    PAIN MANAGEMENT PROCEDURE N/A 2/11/2020    EPIDURAL STEROID INJECTION L3-4 performed by Deepak Guzman at [address] VASCULAR SURGERY      PTA/STENT LT FEMORAL ARTERY   ,   Social History     Tobacco Use    Smoking status: Current Every Day Smoker     Packs/day: 1.00     Years: 38.00     Pack years: 38.00     Start date: 11/26/1981    Smokeless tobacco: Never Used   Substance Use Topics    Alcohol use: No    Drug use: Not Currently   ,   Family History   Problem Relation Age of Onset    Cancer Father     Heart Disease Father     Heart Surgery Father     Diabetes Father     Hypertension Mother     Hypertension Sister     Heart Disease Sister     Stroke Maternal Grandmother     Heart Disease Maternal Grandmother     Heart Disease Paternal Uncle     Heart Disease Maternal Grandfather     Stroke Maternal Grandfather    ,   There is no immunization history on file for this patient.,   Health Maintenance   Topic Date Due    Hepatitis C screen  1965    Meningococcal (ACWY) vaccine (1 - Risk start before 7 months 4-dose series) 1964    Hib vaccine (1 of 1 - Risk 1-dose series) 08/25/1965    Pneumococcal 0-64 years Vaccine (1 of 3 - PCV13) 05/25/1970    Diabetic foot exam  05/25/1974    Diabetic retinal exam  05/25/1974    Meningococcal B vaccine (1 of 2 - Increased Risk Bexsero 2-dose series) 05/25/1974    HIV screen  05/25/1979    Diabetic microalbuminuria test  05/25/1982    DTaP/Tdap/Td vaccine (1 - Tdap) 05/25/1983    Shingles Vaccine (1 of 2) 05/25/2014    Colon cancer screen colonoscopy  05/25/2014    Low dose CT lung screening  05/25/2019    Annual Wellness Visit (AWV)  06/23/2019    Flu vaccine (Season Ended) 09/01/2020    A1C test (Diabetic or Prediabetic)  02/03/2021    Lipid screen  02/03/2021    Potassium monitoring  02/03/2021    Creatinine monitoring  02/03/2021    Hepatitis A vaccine  Aged Out    Hepatitis B vaccine  Aged Out       PHYSICAL EXAMINATION:  [ INSTRUCTIONS:  \"[x]\" Indicates a positive item  \"[]\" Indicates a negative item  -- DELETE ALL ITEMS NOT EXAMINED]  [x] Alert  [x] Oriented to person/place/time    [x] No apparent distress  [] Toxic appearing    [] Face flushed appearing [x] Sclera clear  [] Lips are cyanotic      [x] Breathing appears normal  [] Appears tachypneic      [] Rash on visible skin    [x] Cranial Nerves II-XII grossly intact    [x] Motor grossly intact in visible upper extremities    [x] Motor grossly intact in visible lower extremities    [x] Normal Mood  [] Anxious appearing    [] Depressed appearing  [] Confused appearing      [] Poor short term memory  [] Poor long term memory    [] OTHER:      Due to this being a TeleHealth encounter, evaluation of the following organ systems is limited: Vitals/Constitutional/EENT/Resp/CV/GI//MS/Neuro/Skin/Heme-Lymph-Imm. ASSESSMENT/PLAN:  1. Chronic obstructive pulmonary disease, unspecified COPD type ([facility] [address]  - Use nebulizer as directed. - Follow-up for worsening symptoms.   - Continue on lasix; this is working for him. 2. Tobacco abuse disorder  Approximately 5 minutes of education was provided about quit smoking and the harms of tobacco.  Patient does show understanding. Patient has  the desire to quit smoking in the future. Return in about 4 weeks (around 5/29/2020) for follow-up with labs in clinic.       An  electronic signature was used to authenticate this note. --PARVEZ Morris on 5/11/2020 at 11:46 AM        Pursuant to the emergency declaration under the [address] [place] [address], [facility] waiver authority and the [employer] and [employer] Act, this Virtual  Visit was conducted, with patient's consent, to reduce the patient's risk of exposure to COVID-19 and provide continuity of care for an established patient. Services were provided through a video synchronous discussion virtually to substitute for in-person clinic visit.

## 2020-05-11 ASSESSMENT — ENCOUNTER SYMPTOMS
SHORTNESS OF BREATH: 1
COUGH: 1

## 2020-05-26 DIAGNOSIS — I10 ESSENTIAL HYPERTENSION: ICD-10-CM

## 2020-05-26 LAB
ALBUMIN SERPL-MCNC: 4 G/DL (ref 3.5–5.2)
ALP BLD-CCNC: 106 U/L (ref 40–130)
ALT SERPL-CCNC: 33 U/L (ref 5–41)
ANION GAP SERPL CALCULATED.3IONS-SCNC: 12 MMOL/L (ref 7–19)
AST SERPL-CCNC: 23 U/L (ref 5–40)
BILIRUB SERPL-MCNC: <0.2 MG/DL (ref 0.2–1.2)
BUN BLDV-MCNC: 21 MG/DL (ref 6–20)
CALCIUM SERPL-MCNC: 9.5 MG/DL (ref 8.6–10)
CHLORIDE BLD-SCNC: 102 MMOL/L (ref 98–111)
CO2: 25 MMOL/L (ref 22–29)
CREAT SERPL-MCNC: 0.9 MG/DL (ref 0.5–1.2)
GFR NON-AFRICAN AMERICAN: >60
GLUCOSE BLD-MCNC: 125 MG/DL (ref 74–109)
HBA1C MFR BLD: 6 % (ref 4–6)
POTASSIUM SERPL-SCNC: 4 MMOL/L (ref 3.5–5)
SODIUM BLD-SCNC: 139 MMOL/L (ref 136–145)
TOTAL PROTEIN: 7.6 G/DL (ref 6.6–8.7)

## 2020-05-27 RX ORDER — FUROSEMIDE 20 MG/1
TABLET ORAL
Qty: 30 TABLET | Refills: 11 | Status: SHIPPED | OUTPATIENT
Start: 2020-05-27 | End: 2020-08-05

## 2020-05-29 ENCOUNTER — VIRTUAL VISIT (OUTPATIENT)
Dept: PRIMARY CARE CLINIC | Age: 55
End: 2020-05-29
Payer: MEDICARE

## 2020-05-29 PROCEDURE — 4004F PT TOBACCO SCREEN RCVD TLK: CPT | Performed by: NURSE PRACTITIONER

## 2020-05-29 PROCEDURE — 3017F COLORECTAL CA SCREEN DOC REV: CPT | Performed by: NURSE PRACTITIONER

## 2020-05-29 PROCEDURE — 99214 OFFICE O/P EST MOD 30 MIN: CPT | Performed by: NURSE PRACTITIONER

## 2020-05-29 PROCEDURE — G8417 CALC BMI ABV UP PARAM F/U: HCPCS | Performed by: NURSE PRACTITIONER

## 2020-05-29 PROCEDURE — G8427 DOCREV CUR MEDS BY ELIG CLIN: HCPCS | Performed by: NURSE PRACTITIONER

## 2020-05-29 NOTE — PROGRESS NOTES
[address] [address]             Phone:  [phone]  Fax:  [phone]       2020    TELEHEALTH EVALUATION -- Audio/Visual (During CCWYM-17 public health emergency)    HPI:  Chief Complaint   Patient presents with    Hypertension    Diabetes         Gay Nichols (:  1965) has requested an audio/video evaluation for the following concern(s):    Patient presents today for follow-up hypertension and diabetes. Patient is taking approximately 500 mg metformin daily; his A1C has improved and is down to 6.0. He is doing much better with diet and has been losing weight. Blood pressure has remained stable. Patient states he stopped taking metoprolol and reports his breathing has since improved. He is using nebulizer 3x daily, however. He remains on lasix and states that when he has tried not taking it he swells and has rapid weight gain. He feels much better on this. He  reports that he has been smoking. He started smoking about 38 years ago. He has a 38.00 pack-year smoking history.  He has never used smokeless tobacco.      Lab Results   Component Value Date     2020    K 4.0 2020     2020    CO2 25 2020    BUN 21 (H) 2020    CREATININE 0.9 2020    GLUCOSE 125 (H) 2020    CALCIUM 9.5 2020    PROT 7.6 2020    LABALBU 4.0 2020    BILITOT <0.2 2020    ALKPHOS 106 2020    AST 23 2020    ALT 33 2020    LABGLOM >60 2020    AGRATIO 1.1 10/24/2019    GLOB 3.3 10/24/2019           Lab Results   Component Value Date    LABA1C 6.0 2020     No results found for: EAG  Lab Results   Component Value Date     2020    K 4.0 2020     2020    CO2 25 2020    BUN 21 (H) 2020    CREATININE 0.9 2020    GLUCOSE 125 (H) 2020    CALCIUM 9.5 2020    PROT 7.6 2020    LABALBU 4.0 2020    BILITOT <0.2 2020 extended release tablet TAKE ONE TABLET BY MOUTH EVERY NIGHT  PARVEZ Headley   phenylephrine (RICK-SYNEPHRINE) 1 % nasal spray 1 drop by Nasal route every 4 hours as needed for Congestion  Historical Provider, MD   Hypromellose (ARTIFICIAL TEARS OP) Apply to eye  Historical Provider, MD   aspirin 81 MG EC tablet Take 81 mg by mouth daily  Historical Provider, MD   albuterol sulfate  (90 Base) MCG/ACT inhaler Inhale 2 puffs into the lungs every 6 hours as needed for Wheezing  PARVEZ Llamas       Social History     Tobacco Use    Smoking status: Current Every Day Smoker     Packs/day: 1.00     Years: 38.00     Pack years: 38.00     Start date: 11/26/1981    Smokeless tobacco: Never Used   Substance Use Topics    Alcohol use: No    Drug use: Not Currently        Allergies   Allergen Reactions    Iodine      Iodine containing multi vitamin    Lisinopril      cough    Shellfish-Derived Products Swelling   ,   Past Medical History:   Diagnosis Date    Arthritis     Asthma     Bronchitis     CAD (coronary artery disease)     Cerebral artery occlusion with cerebral infarction ([address]     Emphysema of lung ([address]     Follicular acne     as stated per pt. \"it comes and goes. Nothing helps it. I've been trying to tell doctors i need an antiobiotic.  Hx of blood clots     DVT'S (as stated per pt)    Hypertension     Hypoglycemia     Knee joint pain     Other and unspecified hyperlipidemia     PVD (peripheral vascular disease) ([address]     Sleep apnea     pt does not use cpap or bipap. Self diagnosed. Pt has not seen a physician about this.     Smoker     x 1 pack daily   ,   Past Surgical History:   Procedure Laterality Date    APPENDECTOMY      CARPAL TUNNEL RELEASE Right     HEMORRHOID SURGERY      OTHER SURGICAL HISTORY  11/2015    Femoral stent placement    PAIN MANAGEMENT PROCEDURE N/A 2/11/2020    EPIDURAL STEROID INJECTION L3-4 performed by Deepak Guzman at [address] [place] [address]

## 2020-05-31 PROBLEM — Z72.0 TOBACCO ABUSE DISORDER: Status: ACTIVE | Noted: 2020-05-31

## 2020-05-31 ASSESSMENT — ENCOUNTER SYMPTOMS
PHOTOPHOBIA: 0
VOICE CHANGE: 0
COLOR CHANGE: 0
SHORTNESS OF BREATH: 0
BACK PAIN: 0
NAUSEA: 0
COUGH: 0
VOMITING: 0
RHINORRHEA: 0

## 2020-06-12 ENCOUNTER — VIRTUAL VISIT (OUTPATIENT)
Dept: PRIMARY CARE CLINIC | Age: 55
End: 2020-06-12
Payer: MEDICARE

## 2020-06-12 PROCEDURE — G8417 CALC BMI ABV UP PARAM F/U: HCPCS | Performed by: NURSE PRACTITIONER

## 2020-06-12 PROCEDURE — 99214 OFFICE O/P EST MOD 30 MIN: CPT | Performed by: NURSE PRACTITIONER

## 2020-06-12 PROCEDURE — G8428 CUR MEDS NOT DOCUMENT: HCPCS | Performed by: NURSE PRACTITIONER

## 2020-06-12 PROCEDURE — 3017F COLORECTAL CA SCREEN DOC REV: CPT | Performed by: NURSE PRACTITIONER

## 2020-06-12 PROCEDURE — 4004F PT TOBACCO SCREEN RCVD TLK: CPT | Performed by: NURSE PRACTITIONER

## 2020-06-12 RX ORDER — HYDRALAZINE HYDROCHLORIDE 25 MG/1
25 TABLET, FILM COATED ORAL 4 TIMES DAILY
Qty: 120 TABLET | Refills: 0 | Status: SHIPPED | OUTPATIENT
Start: 2020-06-12 | End: 2020-06-17

## 2020-06-12 NOTE — PROGRESS NOTES
 Hypertension     Hypoglycemia     Knee joint pain     Other and unspecified hyperlipidemia     PVD (peripheral vascular disease) (HCC)     Sleep apnea     pt does not use cpap or bipap. Self diagnosed. Pt has not seen a physician about this.     Smoker     x 1 pack daily   ,   Past Surgical History:   Procedure Laterality Date    APPENDECTOMY      CARPAL TUNNEL RELEASE Right     HEMORRHOID SURGERY      OTHER SURGICAL HISTORY  11/2015    Femoral stent placement    PAIN MANAGEMENT PROCEDURE N/A 2/11/2020    EPIDURAL STEROID INJECTION L3-4 performed by Deepak Guzman at [address] VASCULAR SURGERY      PTA/STENT LT FEMORAL ARTERY   ,   Social History     Tobacco Use    Smoking status: Current Every Day Smoker     Packs/day: 0.50     Years: 38.00     Pack years: 19.00     Start date: 11/26/1981    Smokeless tobacco: Never Used   Substance Use Topics    Alcohol use: No    Drug use: Not Currently   ,   Family History   Problem Relation Age of Onset    Cancer Father     Heart Disease Father     Heart Surgery Father     Diabetes Father     Hypertension Mother     Hypertension Sister     Heart Disease Sister     Stroke Maternal Grandmother     Heart Disease Maternal Grandmother     Heart Disease Paternal Uncle     Heart Disease Maternal Grandfather     Stroke Maternal Grandfather    ,   There is no immunization history on file for this patient.,   Health Maintenance   Topic Date Due    Hepatitis C screen  1965    Meningococcal (ACWY) vaccine (1 - Risk start before 7 months 4-dose series) 1965    Hib vaccine (1 of 1 - Risk 1-dose series) 08/25/1965    Pneumococcal 0-64 years Vaccine (1 of 3 - PCV13) 05/25/1970    Meningococcal B vaccine (1 of 2 - Increased Risk Bexsero 2-dose series) 05/25/1974    HIV screen  05/25/1979    DTaP/Tdap/Td vaccine (1 - Tdap) 05/25/1983    Shingles Vaccine (1 of 2) 05/25/2014    Colon cancer screen

## 2020-06-17 ENCOUNTER — OFFICE VISIT (OUTPATIENT)
Dept: CARDIOLOGY | Age: 55
End: 2020-06-17
Payer: MEDICARE

## 2020-06-17 VITALS
SYSTOLIC BLOOD PRESSURE: 128 MMHG | BODY MASS INDEX: 41.98 KG/M2 | HEIGHT: 68 IN | OXYGEN SATURATION: 97 % | HEART RATE: 88 BPM | DIASTOLIC BLOOD PRESSURE: 72 MMHG | WEIGHT: 277 LBS

## 2020-06-17 PROBLEM — R07.89 CHEST DISCOMFORT: Status: RESOLVED | Noted: 2019-10-23 | Resolved: 2020-06-17

## 2020-06-17 PROCEDURE — 99214 OFFICE O/P EST MOD 30 MIN: CPT | Performed by: NURSE PRACTITIONER

## 2020-06-17 PROCEDURE — G8417 CALC BMI ABV UP PARAM F/U: HCPCS | Performed by: NURSE PRACTITIONER

## 2020-06-17 PROCEDURE — 4004F PT TOBACCO SCREEN RCVD TLK: CPT | Performed by: NURSE PRACTITIONER

## 2020-06-17 PROCEDURE — 3017F COLORECTAL CA SCREEN DOC REV: CPT | Performed by: NURSE PRACTITIONER

## 2020-06-17 PROCEDURE — G8427 DOCREV CUR MEDS BY ELIG CLIN: HCPCS | Performed by: NURSE PRACTITIONER

## 2020-06-17 PROCEDURE — 93000 ELECTROCARDIOGRAM COMPLETE: CPT | Performed by: NURSE PRACTITIONER

## 2020-06-17 ASSESSMENT — ENCOUNTER SYMPTOMS
COUGH: 0
ABDOMINAL DISTENTION: 0
TROUBLE SWALLOWING: 0
VOMITING: 0
FACIAL SWELLING: 0
COLOR CHANGE: 0
NAUSEA: 0
WHEEZING: 0
ABDOMINAL PAIN: 0
BLOOD IN STOOL: 0
EYE REDNESS: 0
BACK PAIN: 1
SHORTNESS OF BREATH: 1
EYE DISCHARGE: 0

## 2020-06-17 NOTE — PROGRESS NOTES
light-headedness. Hematological: Does not bruise/bleed easily. Psychiatric/Behavioral: Negative for behavioral problems and confusion. All other systems reviewed and are negative. Objective  Vital Signs - /72   Pulse 88   Ht 5' 8\" (1.727 m)   Wt 277 lb (125.6 kg)   SpO2 97%   BMI 42.12 kg/m²   Physical Exam  Vitals signs and nursing note reviewed. Constitutional:       Appearance: He is well-developed. HENT:      Head: Normocephalic and atraumatic. Right Ear: External ear normal.      Left Ear: External ear normal.      Nose: Nose normal.   Eyes:      General:         Right eye: No discharge. Left eye: No discharge. Pupils: Pupils are equal, round, and reactive to light. Neck:      Musculoskeletal: Normal range of motion. No edema. Vascular: No carotid bruit or JVD. Trachea: No tracheal deviation. Cardiovascular:      Rate and Rhythm: Normal rate and regular rhythm. Heart sounds: Normal heart sounds. No murmur. No friction rub. No gallop. Pulmonary:      Effort: Pulmonary effort is normal. No respiratory distress. Breath sounds: No wheezing, rhonchi or rales. Abdominal:      General: Bowel sounds are normal. There is no distension. Palpations: Abdomen is soft. Tenderness: There is no abdominal tenderness. Musculoskeletal: Normal range of motion. Skin:     General: Skin is warm and dry. Capillary Refill: Capillary refill takes less than 2 seconds. Findings: No rash. Neurological:      Mental Status: He is alert and oriented to person, place, and time.    Psychiatric:         Behavior: Behavior normal.         Judgment: Judgment normal.         Cardiac data:    ECG 06/17/20  Sinus rhythm with a right bundle branch block, 88 bpm    QTc 0.441 ms    12/16/2014  DSE negative for myocardial ischemia  12/16/2014  Echo  Normal LVFX  1/30/2018  Echo  Normal LVFX  10/9/2019  Echo  Normal LVFX  10/10/2019  lexiscan Positive for

## 2020-06-17 NOTE — PATIENT INSTRUCTIONS
Orders Placed This Encounter   Procedures    EKG 12 lead     Order Specific Question:   Reason for Exam?     Answer:   Coronary artery disease     Return in about 6 months (around 12/17/2020). Call with any questionsor concerns  Follow up with PARVEZ Amaro for non cardiac problems  Report any new problems  Cardiovascular Fitness-Exercise as tolerated. Strive for 15 minutes of exercise most days of the week. Cardiac / HealthyDiet  Continue current medications as directed  Continue plan of treatment  It is always recommended that you bring your medicationsbottles with you to each visit - this is for your safety! Hypertension  (High Blood Pressure)  Most people with high blood pressure (hypertension) have no symptoms. High blood pressure can be a dangerous problem. Hypertension is dangerous because you may have it and not know it. High blood pressure may mean that your heart needs to work harder to pump blood. Your blood pressure is measured with 2 numbers. The first number is when your heart flexes (contracts), and the second number is when your heart relaxes. The higher the numbers are, the more you are at risk for problems. Write down your blood pressure today. The best blood pressure for adults is 120/80 (mmHg) or lower. It is likely that your blood pressure was recorded at least 2 times today. It is important for you to give these numbers to your doctor. If you do not have a doctor, try to get follow-up care at a hospital or community clinic. You may need to start high blood pressure medicine. You may also need to adjust your medicines as told by your doctor. Even mild high blood pressure increases long-term health risks. One high reading does not mean you have hypertension. · Your blood pressure should be taken when you are relaxed. It is also important to sit for about 10 minutes before being tested.    · Things that can increase your blood pressure are:  Injury, Illness, Stress, Caffeine, and some medicines (like decongestants). · High blood pressure does not usually need emergency treatment. HOME CARE  · Lifestyle and medicine changes may be needed, including:   · Weight loss. · Exercise. · Limit the use of salt. · Stop smoking. · If using decongestants or birth control pills, talk to your doctor. These medicines might make blood pressure higher. · Do not use street drugs. · Females should not drink more than 1 alcoholic drink per day. Males should not drink more than 2 alcoholic drinks per day. · Take your blood pressure medicine. You will need to take it every day. If you do not get treated, there are risks, including:   · Heart disease. · Stroke. · Kidney failure. · See your doctor as told. GET HELP RIGHT AWAY IF:  · You get a very bad headache. · You get blurred or changing vision. · You feel confused. · You feel weak, numb, or faint. · You get chest or belly (abdominal) pain. · You throw up (vomit). · You cannot breathe very well. If you have a blood pressure reading with a top number of 180 or higher, you need to see your doctor right away. This is especially true if you are having any of the problems listed above.

## 2020-06-18 ENCOUNTER — TELEPHONE (OUTPATIENT)
Dept: CARDIOLOGY | Age: 55
End: 2020-06-18

## 2020-06-25 ASSESSMENT — ENCOUNTER SYMPTOMS
PHOTOPHOBIA: 0
BACK PAIN: 0
COLOR CHANGE: 0
VOICE CHANGE: 0
VOMITING: 0
NAUSEA: 0
COUGH: 0
RHINORRHEA: 0
SHORTNESS OF BREATH: 1

## 2020-06-26 ENCOUNTER — VIRTUAL VISIT (OUTPATIENT)
Dept: PRIMARY CARE CLINIC | Age: 55
End: 2020-06-26
Payer: MEDICARE

## 2020-06-26 PROCEDURE — 3017F COLORECTAL CA SCREEN DOC REV: CPT | Performed by: NURSE PRACTITIONER

## 2020-06-26 PROCEDURE — G8427 DOCREV CUR MEDS BY ELIG CLIN: HCPCS | Performed by: NURSE PRACTITIONER

## 2020-06-26 PROCEDURE — G8417 CALC BMI ABV UP PARAM F/U: HCPCS | Performed by: NURSE PRACTITIONER

## 2020-06-26 PROCEDURE — 99214 OFFICE O/P EST MOD 30 MIN: CPT | Performed by: NURSE PRACTITIONER

## 2020-06-26 PROCEDURE — 4004F PT TOBACCO SCREEN RCVD TLK: CPT | Performed by: NURSE PRACTITIONER

## 2020-06-26 RX ORDER — PANTOPRAZOLE SODIUM 40 MG/1
40 TABLET, DELAYED RELEASE ORAL DAILY
Qty: 30 TABLET | Refills: 0 | Status: SHIPPED | OUTPATIENT
Start: 2020-06-26 | End: 2020-08-05

## 2020-06-26 ASSESSMENT — ENCOUNTER SYMPTOMS
RHINORRHEA: 0
PHOTOPHOBIA: 0
COLOR CHANGE: 0
VOMITING: 0
BACK PAIN: 0
COUGH: 1
VOICE CHANGE: 0
NAUSEA: 0
SHORTNESS OF BREATH: 0

## 2020-06-26 NOTE — PROGRESS NOTES
[address] [address]             Phone:  [phone]  Fax:  [phone]       2020    TELEHEALTH EVALUATION -- Audio/Visual (During IJKSZ-10 public health emergency)    HPI:  Chief Complaint   Patient presents with    Hypertension         Bryce Colindres (:  1965) has requested an audio/video evaluation for the following concern(s):    Patient presents today for follow-up hypertension. Patient recently saw cardiology and is being scheduled for heart catheterization with Dr. Ashtyn Manzo. Patient reports a chronic cough that is worse with lying down. Patient reports he has a history of GERD but states he has not had acid reflux in years and denies reflux currently. Patient does have a history of tobacco use. He  reports that he has been smoking. He started smoking about 38 years ago. He has a 19.00 pack-year smoking history. He has never used smokeless tobacco.  Patient states that he is going to schedule an appointment with a pulmonologist in [facility] and states that he will \"take care of it himself\". Review of Systems   Constitutional: Negative for chills and fever. HENT: Negative for ear pain, hearing loss, rhinorrhea and voice change. Eyes: Negative for photophobia and visual disturbance. Respiratory: Positive for cough. Negative for shortness of breath. Cardiovascular: Negative for chest pain and palpitations. Gastrointestinal: Negative for nausea and vomiting. Endocrine: Negative. Negative for cold intolerance and heat intolerance. Genitourinary: Negative for difficulty urinating and flank pain. Musculoskeletal: Negative for back pain and neck pain. Skin: Negative for color change and rash. Allergic/Immunologic: Negative for environmental allergies and food allergies. Neurological: Negative for dizziness, speech difficulty and headaches. Hematological: Does not bruise/bleed easily.    Psychiatric/Behavioral: Negative for 08/25/1965    Pneumococcal 0-64 years Vaccine (1 of 3 - PCV13) 05/25/1970    Meningococcal B vaccine (1 of 2 - Increased Risk Bexsero 2-dose series) 05/25/1974    HIV screen  05/25/1979    DTaP/Tdap/Td vaccine (1 - Tdap) 05/25/1983    Shingles Vaccine (1 of 2) 05/25/2014    Colon cancer screen colonoscopy  05/25/2014    Annual Wellness Visit (AWV)  06/23/2019    Flu vaccine (Season Ended) 09/01/2020    A1C test (Diabetic or Prediabetic)  05/26/2021    Potassium monitoring  05/26/2021    Creatinine monitoring  05/26/2021    Lipid screen  02/03/2025    Hepatitis A vaccine  Aged Out    Hepatitis B vaccine  Aged Out       PHYSICAL EXAMINATION:  [ INSTRUCTIONS:  \"[x]\" Indicates a positive item  \"[]\" Indicates a negative item  -- DELETE ALL ITEMS NOT EXAMINED]  [x] Alert  [x] Oriented to person/place/time    [x] No apparent distress  [] Toxic appearing    [] Face flushed appearing [x] Sclera clear  [] Lips are cyanotic      [x] Breathing appears normal  [] Appears tachypneic      [] Rash on visible skin    [x] Cranial Nerves II-XII grossly intact    [x] Motor grossly intact in visible upper extremities    [x] Motor grossly intact in visible lower extremities    [x] Normal Mood  [] Anxious appearing    [] Depressed appearing  [] Confused appearing      [] Poor short term memory  [] Poor long term memory    [] OTHER:      Due to this being a TeleHealth encounter, evaluation of the following organ systems is limited: Vitals/Constitutional/EENT/Resp/CV/GI//MS/Neuro/Skin/Heme-Lymph-Imm. ASSESSMENT/PLAN:  1. Chronic cough  -Patient going to schedule appointment with pulmonology. I discussed that he is to contact us if he needs a referral.    2. Gastroesophageal reflux disease without esophagitis  -In the meantime patient is going to begin Protonix 40 mg as his symptoms may also be worsening by untreated GERD. 3. Essential hypertension  -Blood pressure stable on medication.     4. Coronary artery disease of native artery of native heart with stable angina pectoris ([facility]  -Heart cath per cardiology. 5. Tobacco abuse disorder  Approximately 5 minutes of education was provided about quit smoking and the harms of tobacco.  Patient does show understanding. Patient does not have  the desire to quit smoking in the future. Return in about 3 months (around 9/26/2020) for follow-up office. An  electronic signature was used to authenticate this note. --PARVEZ Palafox on 6/26/2020 at 12:22 PM        Pursuant to the emergency declaration under the [facility], [facility], [facility] waiver authority and the [employer] Act, this Virtual  Visit was conducted, with patient's consent, to reduce the patient's risk of exposure to COVID-19 and provide continuity of care for an established patient. Services were provided through a video synchronous discussion virtually to substitute for in-person clinic visit.

## 2020-06-30 ENCOUNTER — TELEPHONE (OUTPATIENT)
Dept: CARDIOLOGY | Age: 55
End: 2020-06-30

## 2020-07-01 NOTE — TELEPHONE ENCOUNTER
Called and spoke with patient, have cath scheduled for 7/8/20 at 1200 with arrival of 1000. Patient is to be NPO after midnight. Patient instructed to arrive through front entrance of hospital and make immediate left. Patient instructed to hold metformin 2 days prior. Patient advised is someone comes with them they will have to wait in the car, unless pt is needing physical or mental assistance. Patient also instructed they must wear a mask. Patient advised may take morning medications with sip of water. Also advised patient must have COVID testing completed 96 hours prior to procedure at one of our drive thru testing sites. Advised patient that they will be able to proceed with procedure as long as test results are negative. Patient made aware that if testing is not resulted evening prior to procedure that they will have to be rescheduled and possibly retested. Given instructions on where to go and to self quarantine between testing and procedure. Patient verbally understood.

## 2020-07-02 ENCOUNTER — OFFICE VISIT (OUTPATIENT)
Age: 55
End: 2020-07-02

## 2020-07-02 VITALS — HEART RATE: 87 BPM | TEMPERATURE: 98.3 F | OXYGEN SATURATION: 94 %

## 2020-07-02 RX ORDER — LOSARTAN POTASSIUM 50 MG/1
TABLET ORAL
Qty: 180 TABLET | Refills: 0 | Status: SHIPPED | OUTPATIENT
Start: 2020-07-02 | End: 2020-07-27 | Stop reason: SDUPTHER

## 2020-07-02 NOTE — PROGRESS NOTES
Patient was not seen//assessed by provider in the flu clinic today. COVID swab was order in compliance with requirement for testing prior to surgery or invasive procedure. Nasopharyngeal swab was collected and ordered through [employer] vendor.

## 2020-07-03 LAB — SARS-COV-2, PCR: NOT DETECTED

## 2020-07-06 ENCOUNTER — TELEPHONE (OUTPATIENT)
Dept: CARDIOLOGY | Age: 55
End: 2020-07-06

## 2020-07-06 NOTE — TELEPHONE ENCOUNTER
Patient went to get tested for COVID on Thursday for heart cath and they told him he was 2 days early. He is scheduled for a heart cath on Wednesday. Please call patient back at [phone]. He said he wont have cell phone service after midnight.

## 2020-07-06 NOTE — TELEPHONE ENCOUNTER
Patient states that he was told by our office to get tested on 07/03/20 and on arrival he was told that he was two days too early but swabbed him anyway. Patient does not have transport so the only day he was able to get swabbed was the day he was told to go on when he had a ride. Patient called to find out if he could get testing done again or if he was able to have Cath done with his COVID swab already obtained. I verified with my , and was told that patient could come in tomorrow before 11AM on 07/07/20 to get BD Max swab and still come 07/08/20 for Cath.

## 2020-07-07 ENCOUNTER — OFFICE VISIT (OUTPATIENT)
Age: 55
End: 2020-07-07

## 2020-07-07 VITALS — HEART RATE: 87 BPM | TEMPERATURE: 96.7 F | OXYGEN SATURATION: 94 %

## 2020-07-07 LAB — SARS-COV-2, PCR: NOT DETECTED

## 2020-07-08 ENCOUNTER — APPOINTMENT (OUTPATIENT)
Dept: GENERAL RADIOLOGY | Age: 55
End: 2020-07-08
Attending: INTERNAL MEDICINE
Payer: MEDICARE

## 2020-07-08 ENCOUNTER — HOSPITAL ENCOUNTER (OUTPATIENT)
Dept: CARDIAC CATH/INVASIVE PROCEDURES | Age: 55
Setting detail: OBSERVATION
Discharge: LEFT BEFORE DISCHARGE | End: 2020-07-09
Attending: INTERNAL MEDICINE | Admitting: INTERNAL MEDICINE
Payer: MEDICARE

## 2020-07-08 PROBLEM — R93.1 ABNORMAL NUCLEAR CARDIAC IMAGING TEST: Status: ACTIVE | Noted: 2020-07-08

## 2020-07-08 LAB
ANION GAP SERPL CALCULATED.3IONS-SCNC: 11 MMOL/L (ref 7–19)
BUN BLDV-MCNC: 17 MG/DL (ref 6–20)
CALCIUM SERPL-MCNC: 9.3 MG/DL (ref 8.6–10)
CHLORIDE BLD-SCNC: 105 MMOL/L (ref 98–111)
CO2: 21 MMOL/L (ref 22–29)
CREAT SERPL-MCNC: 0.9 MG/DL (ref 0.5–1.2)
GFR NON-AFRICAN AMERICAN: >60
GLUCOSE BLD-MCNC: 105 MG/DL (ref 74–109)
HCT VFR BLD CALC: 39.3 % (ref 42–52)
HEMOGLOBIN: 12.8 G/DL (ref 14–18)
MCH RBC QN AUTO: 30.1 PG (ref 27–31)
MCHC RBC AUTO-ENTMCNC: 32.6 G/DL (ref 33–37)
MCV RBC AUTO: 92.5 FL (ref 80–94)
PDW BLD-RTO: 13.3 % (ref 11.5–14.5)
PLATELET # BLD: 495 K/UL (ref 130–400)
PMV BLD AUTO: 9.2 FL (ref 9.4–12.4)
POTASSIUM SERPL-SCNC: 4.7 MMOL/L (ref 3.5–5)
RBC # BLD: 4.25 M/UL (ref 4.7–6.1)
SODIUM BLD-SCNC: 137 MMOL/L (ref 136–145)
WBC # BLD: 16 K/UL (ref 4.8–10.8)

## 2020-07-08 PROCEDURE — 6360000004 HC RX CONTRAST MEDICATION: Performed by: INTERNAL MEDICINE

## 2020-07-08 PROCEDURE — 6360000002 HC RX W HCPCS: Performed by: INTERNAL MEDICINE

## 2020-07-08 PROCEDURE — C1769 GUIDE WIRE: HCPCS

## 2020-07-08 PROCEDURE — 6360000002 HC RX W HCPCS

## 2020-07-08 PROCEDURE — G0378 HOSPITAL OBSERVATION PER HR: HCPCS

## 2020-07-08 PROCEDURE — 93458 L HRT ARTERY/VENTRICLE ANGIO: CPT | Performed by: INTERNAL MEDICINE

## 2020-07-08 PROCEDURE — 2500000003 HC RX 250 WO HCPCS

## 2020-07-08 PROCEDURE — 92928 PRQ TCAT PLMT NTRAC ST 1 LES: CPT | Performed by: INTERNAL MEDICINE

## 2020-07-08 PROCEDURE — 6370000000 HC RX 637 (ALT 250 FOR IP): Performed by: INTERNAL MEDICINE

## 2020-07-08 PROCEDURE — 99152 MOD SED SAME PHYS/QHP 5/>YRS: CPT | Performed by: INTERNAL MEDICINE

## 2020-07-08 PROCEDURE — 6370000000 HC RX 637 (ALT 250 FOR IP)

## 2020-07-08 PROCEDURE — 2709999900 HC NON-CHARGEABLE SUPPLY

## 2020-07-08 PROCEDURE — C1874 STENT, COATED/COV W/DEL SYS: HCPCS

## 2020-07-08 PROCEDURE — C1887 CATHETER, GUIDING: HCPCS

## 2020-07-08 PROCEDURE — 85027 COMPLETE CBC AUTOMATED: CPT

## 2020-07-08 PROCEDURE — 80048 BASIC METABOLIC PNL TOTAL CA: CPT

## 2020-07-08 PROCEDURE — 71046 X-RAY EXAM CHEST 2 VIEWS: CPT

## 2020-07-08 PROCEDURE — C1760 CLOSURE DEV, VASC: HCPCS

## 2020-07-08 PROCEDURE — 94640 AIRWAY INHALATION TREATMENT: CPT

## 2020-07-08 PROCEDURE — 2580000003 HC RX 258: Performed by: INTERNAL MEDICINE

## 2020-07-08 PROCEDURE — C1894 INTRO/SHEATH, NON-LASER: HCPCS

## 2020-07-08 PROCEDURE — 36415 COLL VENOUS BLD VENIPUNCTURE: CPT

## 2020-07-08 RX ORDER — PRASUGREL 10 MG/1
10 TABLET, FILM COATED ORAL DAILY
Status: DISCONTINUED | OUTPATIENT
Start: 2020-07-09 | End: 2020-07-09 | Stop reason: HOSPADM

## 2020-07-08 RX ORDER — ASPIRIN 81 MG/1
81 TABLET, CHEWABLE ORAL DAILY
Status: DISCONTINUED | OUTPATIENT
Start: 2020-07-08 | End: 2020-07-08 | Stop reason: SDUPTHER

## 2020-07-08 RX ORDER — METOPROLOL SUCCINATE 25 MG/1
25 TABLET, EXTENDED RELEASE ORAL DAILY
Status: DISCONTINUED | OUTPATIENT
Start: 2020-07-09 | End: 2020-07-09 | Stop reason: HOSPADM

## 2020-07-08 RX ORDER — PROMETHAZINE HYDROCHLORIDE 12.5 MG/1
12.5 TABLET ORAL EVERY 6 HOURS PRN
Status: DISCONTINUED | OUTPATIENT
Start: 2020-07-08 | End: 2020-07-09 | Stop reason: HOSPADM

## 2020-07-08 RX ORDER — GUAIFENESIN/DEXTROMETHORPHAN 100-10MG/5
5 SYRUP ORAL EVERY 4 HOURS PRN
Status: DISCONTINUED | OUTPATIENT
Start: 2020-07-08 | End: 2020-07-09 | Stop reason: HOSPADM

## 2020-07-08 RX ORDER — PREGABALIN 25 MG/1
50 CAPSULE ORAL 2 TIMES DAILY
Status: DISCONTINUED | OUTPATIENT
Start: 2020-07-08 | End: 2020-07-09 | Stop reason: HOSPADM

## 2020-07-08 RX ORDER — ALBUTEROL SULFATE 2.5 MG/3ML
2.5 SOLUTION RESPIRATORY (INHALATION) 4 TIMES DAILY
Status: DISCONTINUED | OUTPATIENT
Start: 2020-07-08 | End: 2020-07-09 | Stop reason: HOSPADM

## 2020-07-08 RX ORDER — PANTOPRAZOLE SODIUM 40 MG/1
40 TABLET, DELAYED RELEASE ORAL DAILY
Status: DISCONTINUED | OUTPATIENT
Start: 2020-07-08 | End: 2020-07-09 | Stop reason: HOSPADM

## 2020-07-08 RX ORDER — LOSARTAN POTASSIUM 25 MG/1
50 TABLET ORAL DAILY
Status: DISCONTINUED | OUTPATIENT
Start: 2020-07-09 | End: 2020-07-09 | Stop reason: HOSPADM

## 2020-07-08 RX ORDER — GUAIFENESIN 600 MG/1
600 TABLET, EXTENDED RELEASE ORAL 2 TIMES DAILY
Status: DISCONTINUED | OUTPATIENT
Start: 2020-07-08 | End: 2020-07-09 | Stop reason: HOSPADM

## 2020-07-08 RX ORDER — FUROSEMIDE 20 MG/1
20 TABLET ORAL DAILY
Status: DISCONTINUED | OUTPATIENT
Start: 2020-07-08 | End: 2020-07-09 | Stop reason: HOSPADM

## 2020-07-08 RX ORDER — ASPIRIN 81 MG/1
81 TABLET, CHEWABLE ORAL DAILY
Status: DISCONTINUED | OUTPATIENT
Start: 2020-07-09 | End: 2020-07-08 | Stop reason: SDUPTHER

## 2020-07-08 RX ORDER — ASPIRIN 81 MG/1
81 TABLET ORAL DAILY
Status: DISCONTINUED | OUTPATIENT
Start: 2020-07-08 | End: 2020-07-09 | Stop reason: HOSPADM

## 2020-07-08 RX ORDER — ONDANSETRON 2 MG/ML
4 INJECTION INTRAMUSCULAR; INTRAVENOUS EVERY 6 HOURS PRN
Status: DISCONTINUED | OUTPATIENT
Start: 2020-07-08 | End: 2020-07-09 | Stop reason: HOSPADM

## 2020-07-08 RX ORDER — IODIXANOL 320 MG/ML
100 INJECTION, SOLUTION INTRAVASCULAR
Status: COMPLETED | OUTPATIENT
Start: 2020-07-08 | End: 2020-07-08

## 2020-07-08 RX ORDER — ISOSORBIDE MONONITRATE 30 MG/1
30 TABLET, EXTENDED RELEASE ORAL DAILY
Status: DISCONTINUED | OUTPATIENT
Start: 2020-07-08 | End: 2020-07-09 | Stop reason: HOSPADM

## 2020-07-08 RX ORDER — SODIUM CHLORIDE 9 MG/ML
INJECTION, SOLUTION INTRAVENOUS CONTINUOUS
Status: DISCONTINUED | OUTPATIENT
Start: 2020-07-08 | End: 2020-07-09 | Stop reason: HOSPADM

## 2020-07-08 RX ORDER — POLYETHYLENE GLYCOL 3350 17 G/17G
17 POWDER, FOR SOLUTION ORAL DAILY PRN
Status: DISCONTINUED | OUTPATIENT
Start: 2020-07-08 | End: 2020-07-09 | Stop reason: HOSPADM

## 2020-07-08 RX ADMIN — ASPIRIN 81 MG: 81 TABLET, COATED ORAL at 19:54

## 2020-07-08 RX ADMIN — SODIUM CHLORIDE: 9 INJECTION, SOLUTION INTRAVENOUS at 14:18

## 2020-07-08 RX ADMIN — IODIXANOL 125 ML: 320 INJECTION, SOLUTION INTRAVASCULAR at 15:55

## 2020-07-08 RX ADMIN — GUAIFENESIN 600 MG: 600 TABLET, EXTENDED RELEASE ORAL at 20:38

## 2020-07-08 RX ADMIN — GUAIFENESIN AND DEXTROMETHORPHAN 5 ML: 100; 10 SYRUP ORAL at 19:54

## 2020-07-08 RX ADMIN — FUROSEMIDE 20 MG: 20 TABLET ORAL at 19:54

## 2020-07-08 RX ADMIN — ISOSORBIDE MONONITRATE 30 MG: 30 TABLET ORAL at 19:54

## 2020-07-08 RX ADMIN — PREGABALIN 50 MG: 25 CAPSULE ORAL at 19:54

## 2020-07-08 RX ADMIN — ALBUTEROL SULFATE 2.5 MG: 2.5 SOLUTION RESPIRATORY (INHALATION) at 18:42

## 2020-07-08 ASSESSMENT — PAIN SCALES - GENERAL
PAINLEVEL_OUTOF10: 0
PAINLEVEL_OUTOF10: 0

## 2020-07-08 NOTE — H&P
Ray Ghee, APRN - CNP   Nurse Practitioner   Nurse Practitioner Acute Care   Progress Notes      Signed   Encounter Date:  2020          Related encounter: Office Visit from 2020 in [facility]         Signed        Expand All Collapse All      Show:Clear all  [x]Manual[x]Template[x]Copied    Added by:  [x]PARVEZ Tolliver CNP    []Arian for details  [facility]  [address]  [address]  Phone: [phone]  Fax: [phone]     OFFICE VISIT:  2020     Nader Anguiano - : 1965     Reason For Visit:  Merlene Cheney is a 63 y.o. male who is here for Follow-up (\"choking-suffocating\",hoarse,wheezing,headaches,high BP,coughing); Coronary Artery Disease; and Hypertension        HPI     Mr. Kari Martin is a 63 y.o. male with history of coronary artery disease, hypertension, hyperlipidemia, nicotine dependence, a family history of CAD, and BRITNI who presents with the chief complaint of high blood pressure and dyspnea.     Patient continues to be short of breath with short distances. He states he was just seen at [facility] by Martha Hale who was willing to put in cardiac stents however patient opted not to go through with this at that time. Patient is now wanting cardiac stents to see if his condition improves.     Patient never underwent sleep study. He is taken himself off several blood pressure medicines due to side effects.        Merlene Cheney has no exertional chest pain, pressure, burning, tightness or squeezing. No symptomatic tachy- or deandra-arrhythmia. No lightheadedness, dizziness, or syncope. No numbness or weakness to suggest cerebrovascular accident or transient ischemic attack. he denies signs of bleeding. Reports no edema. He denies orthopnea or paroxysmal nocturnal dyspnea. he has been compliant with his medications.      PCP follows lipids and labs.       PARVEZ Aguirre is PCP.   [facility] the [facility]          Patient Active Problem List     Diagnosis Date Noted    Tobacco abuse disorder 05/31/2020    Xanthelasma of eyelid, bilateral 01/10/2020    Coronary artery disease involving native coronary artery of native heart 11/27/2019    History of sleep apnea 11/27/2019    History of splenectomy 11/27/2019    Leukocytosis 11/07/2019    Essential hypertension 04/14/2016    Hypertension      Other hyperlipidemia        Past Medical History        Past Medical History:   Diagnosis Date    Arthritis      Asthma      Bronchitis      CAD (coronary artery disease)      Cerebral artery occlusion with cerebral infarction (HCC)      Emphysema of lung (HCC)      Follicular acne       as stated per pt. \"it comes and goes. Nothing helps it. I've been trying to tell doctors i need an antiobiotic.  Hx of blood clots       DVT'S (as stated per pt)    Hypertension      Hypoglycemia      Knee joint pain      Other and unspecified hyperlipidemia      PVD (peripheral vascular disease) (HCC)      Sleep apnea       pt does not use cpap or bipap. Self diagnosed. Pt has not seen a physician about this.     Smoker       x 1 pack daily         Past Surgical History         Past Surgical History:   Procedure Laterality Date    APPENDECTOMY        CARPAL TUNNEL RELEASE Right      HEMORRHOID SURGERY        OTHER SURGICAL HISTORY   11/2015     Femoral stent placement    PAIN MANAGEMENT PROCEDURE N/A 2/11/2020     EPIDURAL STEROID INJECTION L3-4 performed by Deepak Guzman at [address]        SPLENECTOMY        VASCULAR SURGERY         PTA/STENT LT FEMORAL ARTERY         Family History         Family History   Problem Relation Age of Onset    Cancer Father      Heart Disease Father      Heart Surgery Father      Diabetes Father      Hypertension Mother      Hypertension Sister      Heart Disease Sister      Stroke Maternal Grandmother      Heart Disease Maternal Grandmother      Heart Disease Paternal Uncle      Heart Disease Maternal Grandfather      Stroke Maternal Grandfather           Social History            Tobacco Use    Smoking status: Current Every Day Smoker       Packs/day: 0.50       Years: 38.00       Pack years: 19.00       Start date: 11/26/1981    Smokeless tobacco: Never Used   Substance Use Topics    Alcohol use: No      Current Facility-Administered Medications          Current Outpatient Medications   Medication Sig Dispense Refill    Metoprolol Succinate 50 MG CS24 Take by mouth daily        furosemide (LASIX) 20 MG tablet TAKE ONE TABLET BY MOUTH EVERY DAY 30 tablet 11    albuterol (PROVENTIL) (2.5 MG/3ML) 0.083% nebulizer solution Take 3 mLs by nebulization 4 times daily 120 each 3    metFORMIN (GLUCOPHAGE) 500 MG tablet Take 1 tablet by mouth 2 times daily (with meals) 60 tablet 0    losartan (COZAAR) 50 MG tablet Take 1 tablet by mouth 2 times daily Indications: takes one in am and one in pm 60 tablet 3    pregabalin (LYRICA) 50 MG capsule Take 1 capsule by mouth 2 times daily for 30 days. 60 capsule 3    isosorbide mononitrate (IMDUR) 30 MG extended release tablet TAKE ONE TABLET BY MOUTH EVERY NIGHT 30 tablet 5    phenylephrine (RICK-SYNEPHRINE) 1 % nasal spray 1 drop by Nasal route every 4 hours as needed for Congestion        Hypromellose (ARTIFICIAL TEARS OP) Apply to eye        aspirin 81 MG EC tablet Take 81 mg by mouth daily        albuterol sulfate  (90 Base) MCG/ACT inhaler Inhale 2 puffs into the lungs every 6 hours as needed for Wheezing 1 Inhaler 5      No current facility-administered medications for this visit.          Allergies: Iodine; Lisinopril; and Shellfish-derived products     Review of Systems  Review of Systems   Constitutional: Negative for activity change, diaphoresis, fatigue, fever and unexpected weight change. HENT: Negative for facial swelling, nosebleeds and trouble swallowing. Eyes: Negative for discharge, redness and visual disturbance. Respiratory: Positive for shortness of breath. Negative for cough and wheezing. Cardiovascular: Negative for chest pain, palpitations and leg swelling. Gastrointestinal: Negative for abdominal distention, abdominal pain, blood in stool, nausea and vomiting. Endocrine: Negative for cold intolerance and heat intolerance. Genitourinary: Negative for dysuria and hematuria. Musculoskeletal: Positive for back pain. Negative for gait problem. Skin: Negative for color change, pallor and rash. Neurological: Negative for dizziness, syncope, facial asymmetry and light-headedness. Hematological: Does not bruise/bleed easily. Psychiatric/Behavioral: Negative for behavioral problems and confusion. All other systems reviewed and are negative.        Objective  Vital Signs - /72   Pulse 88   Ht 5' 8\" (1.727 m)   Wt 277 lb (125.6 kg)   SpO2 97%   BMI 42.12 kg/m²   Physical Exam  Vitals signs and nursing note reviewed. Constitutional:       Appearance: He is well-developed. HENT:      Head: Normocephalic and atraumatic. Right Ear: External ear normal.      Left Ear: External ear normal.      Nose: Nose normal.   Eyes:      General:         Right eye: No discharge. Left eye: No discharge. Pupils: Pupils are equal, round, and reactive to light. Neck:      Musculoskeletal: Normal range of motion. No edema. Vascular: No carotid bruit or JVD. Trachea: No tracheal deviation. Cardiovascular:      Rate and Rhythm: Normal rate and regular rhythm. Heart sounds: Normal heart sounds. No murmur. No friction rub. No gallop. Pulmonary:      Effort: Pulmonary effort is normal. No respiratory distress. Breath sounds: No wheezing, rhonchi or rales. Abdominal:      General: Bowel sounds are normal. There is no distension. Palpations: Abdomen is soft. Tenderness: There is no abdominal tenderness. Musculoskeletal: Normal range of motion. Skin:     General: Skin is warm and dry. Capillary Refill: Capillary refill takes less than 2 seconds. Findings: No rash. Neurological:      Mental Status: He is alert and oriented to person, place, and time. Psychiatric:         Behavior: Behavior normal.         Judgment: Judgment normal.            Cardiac data:     ECG 06/17/20  Sinus rhythm with a right bundle branch block, 88 bpm     QTc 0.441 ms     12/16/2014  DSE negative for myocardial ischemia  12/16/2014  Echo  Normal LVFX  1/30/2018  Echo  Normal LVFX  10/9/2019  Echo  Normal LVFX  10/10/2019  lexiscan Positive for inferior myocardial ischemia, EF 68%, low risk findings, AUC indication 15, AUC score 4, Elroy Frederick MD)   10/23/19  Cath  Long 70% LAD, 80% mid circ, 100% small ND RCA, normal LVFX        Assessment, Recommendations, & Plan:  63 y.o. male with       Diagnosis Orders   1. Coronary artery disease involving native coronary artery of native heart, angina presence unspecified  EKG 12 lead   2. Essential hypertension      3. Other hyperlipidemia      4. Cigarette nicotine dependence without complication            1. Coronary artery disease- patient has been turned down for cardiac surgery by Dr. Ricky Del Real. Lifestyle modifications were recommended. Patient was referred to [facility] and a cardiac cath was offered for stenting however patient opted out at that time. Patient would like to undergo procedure in [facility]. Discussed with Dr. Juan Lei who is agreeable for catheterization/possible PCI.     2.  Essential hypertension-blood pressure today 128/72. No changes made to current regimen.     3. Hyperlipidemia- managed by PCP     4. Nicotine dependence- patient states he has cut back however is still smoking.   Discussed the importance of quitting and encouraged to do so.           Orders Placed This Encounter   Procedures    EKG 12 lead       Order Specific Question:   Reason for Exam?       Answer:   Coronary artery disease      Return in about 6 months (around 12/17/2020). Call with any questionsor concerns  Follow up with PARVEZ Crain for non cardiac problems  Report any new problems  Cardiovascular Fitness-Exercise as tolerated. Strive for 15 minutes of exercise most days of the week. Cardiac / HealthyDiet  Continue current medications as directed  Continue plan of treatment  It is always recommended that you bring your medicationsbottles with you to each visit - this is for your safety!      Please do not hesitate to contact me for any questions or concerns.     Sincerely yours,     PARVEZ Whitney     This dictation was generated by voice recognition computer software. Although all attempts are made to edit dictation for accuracy, there may be errors in the transcription that are not intended.                      PRIOR CARDIAC PROBLEM LIST  (IF APPLICABLE):    23/99/4143  DSE negative for myocardial ischemia  12/16/2014  Echo  Normal LVFX  1/30/2018  Echo  Normal LVFX  10/9/2019  Echo  Normal LVFX  10/10/2109  lexiscan Positive for inferior myocardial ischemia, EF 68%, low risk findings, AUC indication 15, AUC score 4, (MD Juan)   10/23/19  Cath  Long 70% LAD, 80% mid circ, 100% small ND RCA, normal LVFX  12/18/2019  \"It is unlikely that bypass surgery would be of benefit to this gentleman at this time. \" Arminda Spears)      Date of the Proposed Procedure:  07/08/20      Proposed Procedure:  Selective left heart and coronary arteriography with possible percutaneous coronary interventon, (femoral approach), 07/08/20      :  RILEY Hinton MD    Indications:      10/10/2109  lexiscan Positive for inferior myocardial ischemia, EF 68%, low risk findings, AUC indication 15, AUC score 4, (MD Juan)   10/23/19  Cath  Long 70% LAD, 80% mid circ, 100% small ND RCA, normal LVFX  12/18/2019  \"It is unlikely that bypass surgery would be of benefit to this gentleman at this time.\" Manjinder Mendenhall)    American Society of Anesthesiologists (ASA) Classification:  III    Plan of Sedation:  Moderate Sedation    Mallampati Classification:  II    I have examined this patient on 07/08/20  in [facility] Area # 10 in the presence of Cinderella Speed and find no interval changes since the original History and Physical / Consult as noted written by Jennifer HANNON on 6/17/2020    With the constellation of symptoms and these findings, I recommend cardiac catheterization and possibility of percutaneous coronary intervention. I discussed with him  in detail  the risks, benefits and alternatives to this procedure. The risks mentioned to him include but are not limited to:  vascular complications in ~ 3%, stroke <1%, renal dysfunction <5%, myocardial infarction <1%, coronary dissection <1%, need for emergency open heart surgery <1, and death <1% . He appeared to understand, had no questions, and agreed to proceed with this plan. Additionally, there are risks associated with moderate sedation which includes transient drop in blood pressure and need for assisted ventilation. This procedure is scheduled for 07/08/20 .     Juliann Yancey MD

## 2020-07-08 NOTE — LETTER
[facility]  Cardiac Rehab Department  [address] [place] [address] July 7  [phone]  Toll Free [phone]              July 9, 2020    Dear Jaden Graham,    In follow-up to your recent hospitalization, please find this informational packet that has been sent to you on heart disease and the guidelines you are to follow concerning your present cardiac condition and immediate recovery. Due to your cardiac diagnosis and coronary stent placement you now qualify for participation in a Phase II Outpatient Cardiac Rehab Program.  This elective service has been shown to significantly reduce cardiac mortality by 26-31% among patients such as yourself! A brochure has been included in this mailing for the purpose of providing you with a brief overview of program components. Simply contact [address] [place] [address] at [phone] or [facility] at [phone] to inquire about program specifics and the opportunity to enroll. Feel free to reach out to us now or at any other time and our staff will be more than pleased to assist you. Thank you. To the betterment of your health,        [facility] Cardiac Rehab Staff    Hanane Padgett, VINCE Bower, BS, MA  Registered Nurse  Registered Nurse   Exercise Physiologist

## 2020-07-08 NOTE — PROCEDURES
Cardiac Risk Profile -       Risk Factor Yes / No / Unknown       Gender Male   Cigarette Use Yes: current every day smoker   Family History of Cardiovascular Disease Yes: hypertension, heart disease, heart surgery, diabetes, stroke   Diabetes Mellitus yes   Hypercholesteremia no   Hypertension yes        Prior Cardiac History -    12/16/2014  DSE negative for myocardial ischemia  12/16/2014  Echo  Normal LVFX  1/30/2018  Echo  Normal LVFX  10/9/2019  Echo  Normal LVFX  10/10/2109  lexiscan Positive for inferior myocardial ischemia, EF 68%, low risk findings, AUC indication 15, AUC score 4, (MD Juan)   10/23/19  Cath  Long 70% LAD, 80% mid circ, 100% small ND RCA, normal LVFX  12/18/2019  \"It is unlikely that bypass surgery would be of benefit to this gentleman at this time. \" Bob Grider)  7/8/20  Cath  80% LCX, 2.75 x 23 IRLANDA, 40% long prox LAD    Indications for Cardiac Catheterization -      10/10/2109  lexiscan Positive for inferior myocardial ischemia, EF 68%, low risk findings, AUC indication 15, AUC score 4, (MD Juan)   10/23/19  Cath  Long 70% LAD, 80% mid circ, 100% small ND RCA, normal LVFX  12/18/2019  \"It is unlikely that bypass surgery would be of benefit to this gentleman at this time. \" Bob Randall    Diagnostic Catheterization Appropriate Use Criteria Description, [facility] 2012;59:[id number]    After obtaining informed written consent, the patient was brought to the catheterization laboratory where the right groin was prepared in the usual sterile fashion. Moderate Conscious Sedation Protocol Used During this Procedure -    An independent trained observer, registered nurse, administered medications at my direction. We, myself and the independent trained observer / registered nurse, monitored the patients's level of consciousness, vital signs, and physiological status, (including ECG and pulse oximetry) throughout the procedure duration. (See start and stop times below).           Anesthesia: Moderate   Sedation: 0.5 mg Midazolam (Versed)  0 mcg Fentanyl   Start time: 15:02   Stop time: 15:18   ASA Class: III   Estimated blood loss < 5 ml            Additionally, please review the \"[facility] Hemodynamic Procedure Report\", which is generated electronically through the [facility]. This report includes additional details regarding this procedure including, but not limited to:     1. Patient Data,   2. Admission,   3. Procedure,   4. Hemodynamics,   5. Vital Signs,   6. Medications, including conscious sedation medications given during the procedure (as noted above),   7. Procedure Log,   8. Device Usage,   9. Signature Audit [place], and,   10. Signatures. It should be noted, that I sign the \"Signatures\" line electronically through the \"HPF Def Portals\" tab on my computer. A time-out was preformed by the nursing staff immediately prior to beginning the procedure and included the following items:      1) Patient Identification: Lazaro Gupta  2) YOB: 1965  3) Physician:  Liudmila Martell. Wellington Hunt MD  4) Procedure:  Selective left heart and coronary arteriography with possible percutaneous coronary interventon, (femoral approach)  5) Equipment:  Available and ready  6) Site:   Right groin  7) Consents:  Signed and witnessed  8) Allergies:   Iodine; Lisinopril; and Shellfish-derived products    After the time-out was completed, the procedure team, including myself, agreed that this was the correct patient and procedural details. 2% lidocaine was injected above the common femoral artery. Utilizing ultrasound guidance, and the Seldinger technique, the common femoral artery was cannulated and bright red pulsatile blood returned. Using fluoroscopy guidance, the J-tip wire was placed in the common femoral artery. A 6-Fr sheath was inserted. Utilizing 6-Marshallese FL 4 guiding catheter, selective coronary arteriography of the left coronary artery was performed.       At this stage utilizing a 6FR FL4 with 2 SH, the OM2 was injected. The lesion in the OM2 was crossed with a 0.014\" intermediate guide wire. The lesion was then crossed with a 2.75 x 23 mm IRLANDA. A satisfactory angiography result was obtained. At this stage, the equipment was removed. A right femoral arteriogram was performed in the event that a vascular access closure device was to be deployed. A 6-Syriac Mynx vascular access closure device was inserted. Hemostasis was achieved. Sterile dressing was applied. He was taken to his room in stable and satisfactory condition. Family was not available for consultation regarding the results of the cardiac catheterization. Complications:  none      Results:    Left Heart Pressures:      Site Pressure mmHg        Left ventricular pressure -/- mmHg   Left ventricular end-diastolic pressure (LVEDP) - mmHg   Aortic pressure 144/76 mmHg   Mean aortic pressure 106 mmHg       Coronary Arteries:    Left Main Coronary Artery:  A large vessel which arises from the left sinus of Valsalva. It divides into the left anterior descending coronary artery and the left circumflex. There is mild diffuse disease throughout the entire length of the vessel. Left Anterior Coronary Artery:  The LAD is a moderate sized vessel with several diagonal branches. There is mild diffuse disease throughout the entire length of the vessel. Left Circumflex Coronary Artery:  The LCx is a moderate sized vessel with several marginal branches. There is a 80% stenosis in the OM2 portion of this vessel. Right Coronary Artery:  Not injected. Known to be 100% occluded by prior angiographic assessment. Left Ventriculogram:  Not injected      Percutaneous Coronary Intervention:      Lesion Location:   OM2     Visual % stenosis DONNA flow        Prior to PCI 80 3        Post PCI 0 3         Summary:      1. Successful femoral artery ultrasound  2. Successful femoral artery arterogram  3.   Supervision of the administration

## 2020-07-09 ENCOUNTER — TELEPHONE (OUTPATIENT)
Dept: PRIMARY CARE CLINIC | Age: 55
End: 2020-07-09

## 2020-07-09 ENCOUNTER — TELEPHONE (OUTPATIENT)
Dept: CARDIOLOGY | Age: 55
End: 2020-07-09

## 2020-07-09 ENCOUNTER — TELEPHONE (OUTPATIENT)
Dept: INTERNAL MEDICINE | Age: 55
End: 2020-07-09

## 2020-07-09 VITALS
OXYGEN SATURATION: 91 % | HEART RATE: 80 BPM | WEIGHT: 227 LBS | DIASTOLIC BLOOD PRESSURE: 82 MMHG | TEMPERATURE: 97.9 F | SYSTOLIC BLOOD PRESSURE: 139 MMHG | HEIGHT: 68 IN | BODY MASS INDEX: 34.4 KG/M2 | RESPIRATION RATE: 16 BRPM

## 2020-07-09 LAB
LV EF: 68 %
LVEF MODALITY: NORMAL

## 2020-07-09 PROCEDURE — G0378 HOSPITAL OBSERVATION PER HR: HCPCS

## 2020-07-09 PROCEDURE — 6360000002 HC RX W HCPCS: Performed by: INTERNAL MEDICINE

## 2020-07-09 PROCEDURE — 99217 PR OBSERVATION CARE DISCHARGE MANAGEMENT: CPT | Performed by: INTERNAL MEDICINE

## 2020-07-09 RX ORDER — PRASUGREL 10 MG/1
10 TABLET, FILM COATED ORAL DAILY
Qty: 30 TABLET | Refills: 5 | Status: SHIPPED | OUTPATIENT
Start: 2020-07-09 | End: 2021-02-03

## 2020-07-09 RX ORDER — METOPROLOL SUCCINATE 25 MG/1
25 TABLET, EXTENDED RELEASE ORAL DAILY
Qty: 30 TABLET | Refills: 3 | Status: SHIPPED | OUTPATIENT
Start: 2020-07-09 | End: 2021-02-03

## 2020-07-09 NOTE — TELEPHONE ENCOUNTER
Called patient and patient reports he was in the hospital overnight but  left related to  was an ass-he didn't talk to me or explain anything to me. Also I complained of a cough and it was 12 hours later before they gave me anything for cough. Patient reports he is going to stay on his losartan and will keep an eye on his blood pressure. Patient voices no complaints on groin area where cath was done  Patient declined follow up with [facility] \"I have an appointment in a couple of weeks and will wait until then. \"

## 2020-07-09 NOTE — PROGRESS NOTES
Jaden Graham arrived to room # [id number]. Presented with: heart cath with stent placement  Mental Status: Patient is oriented, alert, coherent, logical, thought processes intact and able to concentrate and follow conversation. Vitals:    07/08/20 1555   BP: (!) 152/92   Pulse: 87   Resp: 20   Temp: 97.9 °F (36.6 °C)   SpO2: 95%     Patient safety contract and falls prevention contract reviewed with patient Yes. Oriented Patient to room. Call light within reach. Yes.   Needs, issues or concerns expressed at this time: no.      Electronically signed by Yolanda Tanner RN on 7/8/2020 at 7:12 PM

## 2020-07-09 NOTE — TELEPHONE ENCOUNTER
Patient left AMA after having PCI to Circ yesterday evening. Medications were reviewed this morning by Dr. Alisson Pena and sent to [facility]. I have tried calling the patient to go over these medication changes but he will not answer his phone and does not have voicemail set-up.

## 2020-07-09 NOTE — CONSULTS
Cardiac Rehab PTCA/Stent education packet was sent to the patient's address on record. Handouts included were titled; \"Home Instructions Following a Cardiac Event\", \"Cardiac Home Exercise Program - Phase I\", \"Risk Factors for Heart Disease and Stroke\" and \"Cardiac Diet/Low Cholesterol\". Patient was instructed to contact [place] or the hospital nearest their residence for the opportunity to enroll in Phase II Outpatient Cardiac Rehab.

## 2020-07-09 NOTE — PROGRESS NOTES
Patient told this nurse \"I am leaving at [place] and I'm not changing my mind. \" This nurse educated the patient on the importance of staying until the doctor discharged the patient for the patient's own health and the patient stated he is still leaving at 6am no matter what.

## 2020-07-09 NOTE — TELEPHONE ENCOUNTER
[facility] Transitions Initial Follow Up Call    Outreach made within 2 business days of discharge: Yes    Patient: Shirley Davis Patient : 1965   MRN: [id number]  Reason for Admission:Admitted  20 for heart cath, left AMA through the night   Discharge Date: 20    Discharge Diagnoses:  1. Coronary artery disease  2. Systemic arterial hypertension  3. Diabetes mellitus  4. Ongoing cigarette use  5. Family history of cardiovascular disease   Spoke with: per note from Cyrus Laura  \" Called patient and patient reports he was in the hospital overnight but  left related to  was an ass-he didn't talk to me or explain anything to me. Also I complained of a cough and it was 12 hours later before they gave me anything for cough. Patient reports he is going to stay on his losartan and will keep an eye on his blood pressure. Patient voices no complaints on groin area where cath was done  Patient declined follow up with Alexandria Rudd \"I have an appointment in a couple of weeks and will wait until then. \"      Discharge department/facility:[address], [address]         Patient declines follow up  appointment with PCP within 7-14 days    Follow Up  Future Appointments   Date Time Provider Christopher Madden   2020  2:30 PM Harlon Steve, APRN - CNP [facility]   8433 56:35 AM PARVEZ Ocasio [facility]   2020  1:45 PM PARVEZ Pisano CNP [address] [place] [address]. [facility], [address] [place] [address]

## 2020-07-09 NOTE — DISCHARGE SUMMARY
[facility]    Discharge Summary          I personally saw the patient and rounded with:  no RN, on  7/9/20      The observations documented in this note, including the assessment and plan are mine              Patient ID: Samantha Olson      Patient's PCP: PARVEZ Jesus    Admit Date: 7/8/2020     Discharge Date:  07/09/20  (AMA)    Admitting Physician:  07/09/20       Discharge Physician: Rodney Jeffrey MD     Discharge Diagnoses:    1. Coronary artery disease     12/16/2014  DSE negative for myocardial ischemia  12/16/2014  Echo  Normal LVFX  1/30/2018  Echo  Normal LVFX  10/9/2019  Echo  Normal LVFX  10/10/2109  lexiscan Positive for inferior myocardial ischemia, EF 68%, low risk findings, AUC indication 15, AUC score 4, (MD Juan)   10/23/19  Cath  Long 70% LAD, 80% mid circ, 100% small ND RCA, normal LVFX  12/18/2019  \"It is unlikely that bypass surgery would be of benefit to this gentleman at this time. \" Dana Quintanilla)  7/8/20  Cath  80% LCX, 2.75 x 23 IRLANDA, 40% long prox LAD    2. Systemic arterial hypertension  3. Diabetes mellitus  4. Ongoing cigarette use  5. Family history of cardiovascular disease      Cardiac Specific Diagnoses:    Specialty Problems        Cardiology Problems    Hypertension        Other hyperlipidemia        Essential hypertension        Coronary artery disease involving native coronary artery of native heart                The patient was seen and examined on day of discharge and this discharge summary is in conjunction with any daily progress note from day of discharge. History of Present Illness: The patient was seen in the office on 6/17/2020 by Minor HANNON and the following was noted:    \"Mr. Menchaca is a 63 y. o. male with history of coronary artery disease, hypertension, hyperlipidemia, nicotine dependence, a family history of CAD, and BRITNI who presents with the chief complaint of high blood pressure and dyspnea.     Patient continues to be short of breath with short distances. Akhil Baer states he was just seen at [address] by  who was willing to put in cardiac stents however patient opted not to go through with this at that time. Pineda Wright is now wanting cardiac stents to see if his condition improves.     Patient never underwent sleep study. Akhil Baer is taken himself off several blood pressure medicines due to side effects.        Cristina has no exertional chest pain, pressure, burning, tightness or squeezing. No symptomatic tachy- or deandra-arrhythmia.  No lightheadedness, dizziness, or syncope. No numbness or weakness to suggest cerebrovascular accident or transient ischemic attack. he denies signs of bleeding. Reports no edema. He denies orthopnea or paroxysmal nocturnal dyspnea. he has been compliant with his medications. \"     Additionally,  It was noted by the same observer at that time:    \"1. Coronary artery disease- patient has been turned down for cardiac surgery by Dr. Giovanni Vasquez. [place] were recommended. Pineda Wright was referred to [address] and a cardiac cath was offered for stenting however patient opted out at that time.  Patient would like to undergo procedure in [place] [address] with Dr. Brady Garcia who is agreeable for catheterization/possible PCI. \"    He was electively admitted for cardiac catheterization. Hospital Course:     After admission, the patient underwent cardiac catheterization according to the following criteria:    10/10/2109  lexiscan Positive for inferior myocardial ischemia, EF 68%, low risk findings, AUC indication 15, AUC score 4, (MD Juan)   10/23/19  Cath  Long 70% LAD, 80% mid circ, 100% small ND RCA, normal LVFX  12/18/2019  \"It is unlikely that bypass surgery would be of benefit to this gentleman at this time. \" Bob Randall     Diagnostic Catheterization Appropriate Use Criteria Description, [facility] 2012;59:[id number] .     Selective left heart and coronary arteriography was preformed, which revealed:    1. Successful femoral artery ultrasound  2. Successful femoral artery arterogram  3. Supervision of the administration of moderate conscious sedation  4. Double vessel coronary artery disease involving the OM2 and mid RCA  5. Left ventricular function is not assessed at this time   6.  80% lesion in the OM2  7. Successful percutaneous coronary intervention utilizing a single drug eluding stent to the OM2.  8.  Systemic arterial hypertension (systolic blood pressure > 140 mmHg). There were no apparent complications. Unfortunately, the patient left AMA prior to being seen on 07/09/20     He was informed by the nursing staff that is was a most unwise decision with risks including but not limited to death, dismemberment, injury to himself and or others. He appeared to the understand the potential consequences of this action but none the less proceeded to leave against medial advise. Prior to him going AMA, an appointment was made for him to see PARVEZ Tomlinson in approximately 4 weeks in [facility]    Discharge medications will be sent to [facility] in [address] [place] [address]      Significant Diagnostic Studies:    1. Selective coronary arteriography (femoral approach), 7/8/2020       Significant Therapeutic Endeavors:      1. Percutaneous coronary intervention, 7/8/2020  2. Medical non compliance, 07/09/20       Activity: activity as directed per discharge instructions. Diet:  Cardiac diet    Labs:  For convenience and continuity at follow-up the following most recent labs are provided:      CBC:    Lab Results   Component Value Date    WBC 16.0 07/08/2020    HGB 12.8 07/08/2020    HCT 39.3 07/08/2020     07/08/2020       Renal:    Lab Results   Component Value Date     07/08/2020    K 4.7 07/08/2020     07/08/2020    CO2 21 07/08/2020    BUN 17 07/08/2020    CREATININE 0.9 07/08/2020    CALCIUM 9.3 07/08/2020

## 2020-07-09 NOTE — PROGRESS NOTES
Tele monitor room called to notify nursing staff the patient has had 5 pauses throughout the night, with the longest pause being 3 seconds. Patient is up and walking in the hallway with no need expressed at this time. Will continue to monitor.  Electronically signed by Liss Cherry RN on 7/9/2020 at 4:25 AM

## 2020-07-09 NOTE — PROGRESS NOTES
Received more phone calls from tele monitor room about patient having pauses. Copy of pauses in soft chart. Checked on patient and patient has been sleeping. Patient states he has been told he has sleep apnea and stops breathing while he is asleep.

## 2020-07-27 ENCOUNTER — VIRTUAL VISIT (OUTPATIENT)
Dept: PRIMARY CARE CLINIC | Age: 55
End: 2020-07-27
Payer: MEDICARE

## 2020-07-27 PROCEDURE — 4004F PT TOBACCO SCREEN RCVD TLK: CPT | Performed by: NURSE PRACTITIONER

## 2020-07-27 PROCEDURE — 3017F COLORECTAL CA SCREEN DOC REV: CPT | Performed by: NURSE PRACTITIONER

## 2020-07-27 PROCEDURE — G8417 CALC BMI ABV UP PARAM F/U: HCPCS | Performed by: NURSE PRACTITIONER

## 2020-07-27 PROCEDURE — G8427 DOCREV CUR MEDS BY ELIG CLIN: HCPCS | Performed by: NURSE PRACTITIONER

## 2020-07-27 PROCEDURE — 99214 OFFICE O/P EST MOD 30 MIN: CPT | Performed by: NURSE PRACTITIONER

## 2020-07-27 NOTE — PROGRESS NOTES
[address] [place] [address]             Phone:  [phone]  Fax:  [phone]       2020    TELEHEALTH EVALUATION -- Audio/Visual (During [id number] public health emergency)    HPI:  Chief Complaint   Patient presents with    Hypertension    Fatigue         Eber Rinaldi (:  1965) has requested an audio/video evaluation for the following concern(s):    Patient presents today for evaluation of fatigue, hypertension, joint pain. Patient states he has concern for lupus or an autoimmune disease. He reports having persistent joint pains and feeling tired; he has history of coronary artery disease, HTN, leukocytosis, hyperlipidemia, blood clots. He  reports that he has been smoking. He started smoking about 38 years ago. He has a 38.00 pack-year smoking history. He has never used smokeless tobacco.  He denies any rashes or fevers. Review of Systems   Constitutional: Positive for fatigue. Negative for chills and fever. HENT: Negative for ear pain, hearing loss, rhinorrhea and voice change. Eyes: Negative for photophobia and visual disturbance. Respiratory: Negative for cough and shortness of breath. Cardiovascular: Negative for chest pain and palpitations. Gastrointestinal: Negative for nausea and vomiting. Endocrine: Negative. Negative for cold intolerance and heat intolerance. Genitourinary: Negative for difficulty urinating and flank pain. Musculoskeletal: Positive for arthralgias, back pain and myalgias. Negative for neck pain. Skin: Negative for color change and rash. Allergic/Immunologic: Negative for environmental allergies and food allergies. Neurological: Negative for dizziness, speech difficulty and headaches. Hematological: Does not bruise/bleed easily. Psychiatric/Behavioral: Negative for sleep disturbance and suicidal ideas. Prior to Visit Medications    Medication Sig Taking?  Authorizing Provider   pantoprazole (PROTONIX) 05/25/1970    Meningococcal B vaccine (1 of 4 - Increased Risk Bexsero 2-dose series) 05/25/1974    HIV screen  05/25/1979    DTaP/Tdap/Td vaccine (1 - Tdap) 05/25/1983    Shingles Vaccine (1 of 2) 05/25/2014    Colon cancer screen colonoscopy  05/25/2014    Annual Wellness Visit (AWV)  06/23/2019    Flu vaccine (1) 09/01/2020    A1C test (Diabetic or Prediabetic)  05/26/2021    Potassium monitoring  08/04/2021    Creatinine monitoring  08/04/2021    Lipid screen  08/04/2025    Hepatitis A vaccine  Aged Out    Hepatitis B vaccine  Aged Out       PHYSICAL EXAMINATION:  [ INSTRUCTIONS:  \"[x]\" Indicates a positive item  \"[]\" Indicates a negative item  -- DELETE ALL ITEMS NOT EXAMINED]  [x] Alert  [x] Oriented to person/place/time    [x] No apparent distress  [] Toxic appearing    [] Face flushed appearing [x] Sclera clear  [] Lips are cyanotic      [x] Breathing appears normal  [] Appears tachypneic      [] Rash on visible skin    [x] Cranial Nerves II-XII grossly intact    [x] Motor grossly intact in visible upper extremities    [x] Motor grossly intact in visible lower extremities    [x] Normal Mood  [] Anxious appearing    [] Depressed appearing  [] Confused appearing      [] Poor short term memory  [] Poor long term memory    [] OTHER:      Due to this being a TeleHealth encounter, evaluation of the following organ systems is limited: Vitals/Constitutional/EENT/Resp/CV/GI//MS/Neuro/Skin/Heme-Lymph-Imm. ASSESSMENT/PLAN:  1. Essential hypertension    - GLENN Screen with Reflex; Future  - JACQUIE 2 - Anti SSA & Anti SSB; Future  - CBC Auto Differential; Future  - Sedimentation Rate; Future  - C-Reactive Protein; Future  - Comprehensive Metabolic Panel; Future    2. Chronic fatigue    - GLENN Screen with Reflex; Future  - JACQUIE 2 - Anti SSA & Anti SSB; Future  - CBC Auto Differential; Future  - Sedimentation Rate; Future  - C-Reactive Protein; Future  - Comprehensive Metabolic Panel; Future    3.  Arthralgia,

## 2020-07-28 ENCOUNTER — HOSPITAL ENCOUNTER (OUTPATIENT)
Dept: PAIN MANAGEMENT | Age: 55
Discharge: HOME | End: 2020-07-28
Payer: MEDICARE

## 2020-07-28 VITALS
RESPIRATION RATE: 18 BRPM | HEART RATE: 92 BPM | OXYGEN SATURATION: 97 % | DIASTOLIC BLOOD PRESSURE: 99 MMHG | TEMPERATURE: 97.8 F | SYSTOLIC BLOOD PRESSURE: 177 MMHG

## 2020-07-28 PROCEDURE — G0260 INJ FOR SACROILIAC JT ANESTH: HCPCS

## 2020-07-28 PROCEDURE — 2500000003 HC RX 250 WO HCPCS

## 2020-07-28 PROCEDURE — 6360000002 HC RX W HCPCS

## 2020-07-28 PROCEDURE — 3209999900 FLUORO FOR SURGICAL PROCEDURES

## 2020-07-28 PROCEDURE — 2580000003 HC RX 258

## 2020-07-28 RX ORDER — BUPIVACAINE HYDROCHLORIDE 5 MG/ML
INJECTION, SOLUTION EPIDURAL; INTRACAUDAL
Status: COMPLETED | OUTPATIENT
Start: 2020-07-28 | End: 2020-07-28

## 2020-07-28 RX ORDER — LIDOCAINE HYDROCHLORIDE 10 MG/ML
INJECTION, SOLUTION EPIDURAL; INFILTRATION; INTRACAUDAL; PERINEURAL
Status: COMPLETED | OUTPATIENT
Start: 2020-07-28 | End: 2020-07-28

## 2020-07-28 RX ORDER — SODIUM CHLORIDE 9 MG/ML
INJECTION INTRAVENOUS
Status: COMPLETED | OUTPATIENT
Start: 2020-07-28 | End: 2020-07-28

## 2020-07-28 RX ORDER — METHYLPREDNISOLONE ACETATE 80 MG/ML
INJECTION, SUSPENSION INTRA-ARTICULAR; INTRALESIONAL; INTRAMUSCULAR; SOFT TISSUE
Status: COMPLETED | OUTPATIENT
Start: 2020-07-28 | End: 2020-07-28

## 2020-07-28 RX ADMIN — LIDOCAINE HYDROCHLORIDE 7 ML: 10 INJECTION, SOLUTION EPIDURAL; INFILTRATION; INTRACAUDAL; PERINEURAL at 09:50

## 2020-07-28 RX ADMIN — BUPIVACAINE HYDROCHLORIDE 2 ML: 5 INJECTION, SOLUTION EPIDURAL; INTRACAUDAL at 09:50

## 2020-07-28 RX ADMIN — SODIUM CHLORIDE 3 ML: 9 INJECTION INTRAVENOUS at 09:51

## 2020-07-28 RX ADMIN — METHYLPREDNISOLONE ACETATE 80 MG: 80 INJECTION, SUSPENSION INTRA-ARTICULAR; INTRALESIONAL; INTRAMUSCULAR; SOFT TISSUE at 09:50

## 2020-07-28 ASSESSMENT — PAIN SCALES - GENERAL: PAINLEVEL_OUTOF10: 8

## 2020-07-28 ASSESSMENT — PAIN DESCRIPTION - LOCATION: LOCATION: BACK

## 2020-07-28 ASSESSMENT — PAIN DESCRIPTION - DESCRIPTORS: DESCRIPTORS: ACHING;CONSTANT

## 2020-07-28 ASSESSMENT — PAIN DESCRIPTION - PROGRESSION: CLINICAL_PROGRESSION: GRADUALLY WORSENING

## 2020-07-28 ASSESSMENT — PAIN DESCRIPTION - PAIN TYPE: TYPE: CHRONIC PAIN

## 2020-07-28 ASSESSMENT — PAIN DESCRIPTION - ONSET: ONSET: AWAKENED FROM SLEEP

## 2020-07-28 ASSESSMENT — PAIN - FUNCTIONAL ASSESSMENT: PAIN_FUNCTIONAL_ASSESSMENT: PREVENTS OR INTERFERES SOME ACTIVE ACTIVITIES AND ADLS

## 2020-07-28 ASSESSMENT — PAIN DESCRIPTION - FREQUENCY: FREQUENCY: CONTINUOUS

## 2020-07-28 ASSESSMENT — PAIN DESCRIPTION - ORIENTATION: ORIENTATION: RIGHT;LEFT

## 2020-07-28 NOTE — INTERVAL H&P NOTE
Update History & Physical    The patient's History and Physical  was reviewed with the patient and I examined the patient. There was  NO [id number]}. The surgical site was confirmed by the patient and me. Plan: The risks, benefits, expected outcome, and alternative to the recommended procedure have been discussed with the patient. Patient understands and wants to proceed with the procedure.      Electronically signed by Dilan Wright MD on 7/28/2020 at 10:00 AM

## 2020-08-04 DIAGNOSIS — I10 ESSENTIAL HYPERTENSION: ICD-10-CM

## 2020-08-04 LAB
ALBUMIN SERPL-MCNC: 4.2 G/DL (ref 3.5–5.2)
ALP BLD-CCNC: 109 U/L (ref 40–130)
ALT SERPL-CCNC: 32 U/L (ref 5–41)
ANION GAP SERPL CALCULATED.3IONS-SCNC: 18 MMOL/L (ref 7–19)
AST SERPL-CCNC: 20 U/L (ref 5–40)
BASOPHILS ABSOLUTE: 0 K/UL (ref 0–0.2)
BASOPHILS RELATIVE PERCENT: 0 % (ref 0–1)
BILIRUB SERPL-MCNC: <0.2 MG/DL (ref 0.2–1.2)
BUN BLDV-MCNC: 22 MG/DL (ref 6–20)
CALCIUM SERPL-MCNC: 10.3 MG/DL (ref 8.6–10)
CHLORIDE BLD-SCNC: 100 MMOL/L (ref 98–111)
CHOLESTEROL, TOTAL: 243 MG/DL (ref 160–199)
CO2: 20 MMOL/L (ref 22–29)
CREAT SERPL-MCNC: 0.9 MG/DL (ref 0.5–1.2)
EOSINOPHILS ABSOLUTE: 0.87 K/UL (ref 0–0.6)
EOSINOPHILS RELATIVE PERCENT: 4 % (ref 0–5)
GFR AFRICAN AMERICAN: >59
GFR NON-AFRICAN AMERICAN: >60
GLUCOSE BLD-MCNC: 108 MG/DL (ref 74–109)
HCT VFR BLD CALC: 41.7 % (ref 42–52)
HDLC SERPL-MCNC: 32 MG/DL (ref 55–121)
HEMOGLOBIN: 13.5 G/DL (ref 14–18)
HYPOCHROMIA: ABNORMAL
IMMATURE GRANULOCYTES #: 0.2 K/UL
LDL CHOLESTEROL CALCULATED: 133 MG/DL
LYMPHOCYTES ABSOLUTE: 4.8 K/UL (ref 1.1–4.5)
LYMPHOCYTES RELATIVE PERCENT: 22 % (ref 20–40)
MCH RBC QN AUTO: 29.4 PG (ref 27–31)
MCHC RBC AUTO-ENTMCNC: 32.4 G/DL (ref 33–37)
MCV RBC AUTO: 90.8 FL (ref 80–94)
MONOCYTES ABSOLUTE: 3.5 K/UL (ref 0–0.9)
MONOCYTES RELATIVE PERCENT: 16 % (ref 0–10)
NEUTROPHILS ABSOLUTE: 12.6 K/UL (ref 1.5–7.5)
NEUTROPHILS RELATIVE PERCENT: 58 % (ref 50–65)
PDW BLD-RTO: 13.4 % (ref 11.5–14.5)
PLATELET # BLD: 518 K/UL (ref 130–400)
PLATELET SLIDE REVIEW: ADEQUATE
PMV BLD AUTO: 9.8 FL (ref 9.4–12.4)
POTASSIUM SERPL-SCNC: 4.2 MMOL/L (ref 3.5–5)
RBC # BLD: 4.59 M/UL (ref 4.7–6.1)
SODIUM BLD-SCNC: 138 MMOL/L (ref 136–145)
TOTAL PROTEIN: 7.7 G/DL (ref 6.6–8.7)
TRIGL SERPL-MCNC: 388 MG/DL (ref 0–149)
WBC # BLD: 21.7 K/UL (ref 4.8–10.8)

## 2020-08-05 ENCOUNTER — OFFICE VISIT (OUTPATIENT)
Dept: CARDIOLOGY | Age: 55
End: 2020-08-05
Payer: MEDICARE

## 2020-08-05 VITALS
SYSTOLIC BLOOD PRESSURE: 136 MMHG | BODY MASS INDEX: 41.98 KG/M2 | OXYGEN SATURATION: 97 % | HEIGHT: 68 IN | WEIGHT: 277 LBS | HEART RATE: 89 BPM | DIASTOLIC BLOOD PRESSURE: 84 MMHG

## 2020-08-05 PROBLEM — I45.5 SINUS PAUSE: Status: ACTIVE | Noted: 2020-08-05

## 2020-08-05 PROBLEM — R93.1 ABNORMAL NUCLEAR CARDIAC IMAGING TEST: Status: RESOLVED | Noted: 2020-07-08 | Resolved: 2020-08-05

## 2020-08-05 PROCEDURE — G8417 CALC BMI ABV UP PARAM F/U: HCPCS | Performed by: NURSE PRACTITIONER

## 2020-08-05 PROCEDURE — 93000 ELECTROCARDIOGRAM COMPLETE: CPT | Performed by: NURSE PRACTITIONER

## 2020-08-05 PROCEDURE — G8427 DOCREV CUR MEDS BY ELIG CLIN: HCPCS | Performed by: NURSE PRACTITIONER

## 2020-08-05 PROCEDURE — 4004F PT TOBACCO SCREEN RCVD TLK: CPT | Performed by: NURSE PRACTITIONER

## 2020-08-05 PROCEDURE — 3017F COLORECTAL CA SCREEN DOC REV: CPT | Performed by: NURSE PRACTITIONER

## 2020-08-05 PROCEDURE — 99214 OFFICE O/P EST MOD 30 MIN: CPT | Performed by: NURSE PRACTITIONER

## 2020-08-05 RX ORDER — LOTEPREDNOL ETABONATE 5 MG/ML
2 SUSPENSION/ DROPS OPHTHALMIC DAILY
COMMUNITY

## 2020-08-05 RX ORDER — LOSARTAN POTASSIUM 50 MG/1
TABLET ORAL
Qty: 180 TABLET | Refills: 0 | Status: SHIPPED | OUTPATIENT
Start: 2020-08-05 | End: 2020-10-01 | Stop reason: SDUPTHER

## 2020-08-05 RX ORDER — PANTOPRAZOLE SODIUM 40 MG/1
40 TABLET, DELAYED RELEASE ORAL DAILY
Qty: 30 TABLET | Refills: 0 | Status: SHIPPED | OUTPATIENT
Start: 2020-08-05 | End: 2020-12-16

## 2020-08-05 RX ORDER — ALBUTEROL SULFATE 90 UG/1
AEROSOL, METERED RESPIRATORY (INHALATION)
Qty: 8.5 G | Refills: 0 | Status: SHIPPED | OUTPATIENT
Start: 2020-08-05 | End: 2020-10-01 | Stop reason: SDUPTHER

## 2020-08-05 RX ORDER — TRAZODONE HYDROCHLORIDE 50 MG/1
50 TABLET ORAL NIGHTLY
Qty: 30 TABLET | Refills: 0 | Status: SHIPPED | OUTPATIENT
Start: 2020-08-05 | End: 2020-12-16

## 2020-08-05 ASSESSMENT — ENCOUNTER SYMPTOMS
VOMITING: 0
COLOR CHANGE: 0
ABDOMINAL PAIN: 0
EYE REDNESS: 0
BLOOD IN STOOL: 0
NAUSEA: 0
PHOTOPHOBIA: 0
VOICE CHANGE: 0
BACK PAIN: 1
VOMITING: 0
SHORTNESS OF BREATH: 0
WHEEZING: 0
COLOR CHANGE: 0
RHINORRHEA: 0
ABDOMINAL DISTENTION: 0
TROUBLE SWALLOWING: 0
COUGH: 0
EYE DISCHARGE: 0
FACIAL SWELLING: 0
NAUSEA: 0
COUGH: 0
BACK PAIN: 1

## 2020-08-05 NOTE — PROGRESS NOTES
[address] [place] [address] Cardiology  [address]  [address]  Phone: [phone]  Fax: [phone]    OFFICE VISIT:  2020    Black [place] - : 1965    Reason For Visit:  Julian Mcrae is a 63 y.o. male who is here for Follow-Up from Hospital (\"gas pockets in left collar bone\"SOB better,fast HR); Coronary Artery Disease; and Hypertension      HPI    Mr. Sean Car is a 63 y.o. male with history of coronary artery disease, hypertension, hyperlipidemia, nicotine dependence, a family history of CAD, and BRITNI who presents with the chief complaint follow-up post cardiac catheterization. On 2020 patient underwent cardiac catheterization. Patient received drug-eluting stent to his left circumflex. Multiple pauses were recorded on telemetry during patient stay and he left AMA prior to these being addressed. Patient is here today for follow-up. He says since procedure his breathing has improved 30 to 40%. He has been able to be more active. He has just picked up his Effient today from the pharmacy and has not been taking it since he left the hospital.  He states he is having tachycardia pretty frequently. However, when he was in the hospital there were pauses on his telemetry. He states his PCP has started him on hydralazine however he has not started taking this medication and he is uncertain if he should or if he will. Julian Mcrae has no exertional chest pain, pressure, burning, tightness or squeezing. No symptomatic tachy- or deandra-arrhythmia. No lightheadedness, dizziness, or syncope. No numbness or weakness to suggest cerebrovascular accident or transient ischemic attack. he denies signs of bleeding. Reports no edema. He denies orthopnea or paroxysmal nocturnal dyspnea. he has not been compliant with his medications. PCP follows lipids and labs. PARVEZ Muir is PCP.   Black [place] has the following history as recorded in [facility]:    Patient Active Problem List Diagnosis Date Noted    Sinus pause 08/05/2020    Tobacco abuse disorder 05/31/2020    Xanthelasma of eyelid, bilateral 01/10/2020    Coronary artery disease involving native coronary artery of native heart 11/27/2019    History of sleep apnea 11/27/2019    History of splenectomy 11/27/2019    Leukocytosis 11/07/2019    Essential hypertension 04/14/2016    Hypertension     Other hyperlipidemia      Past Medical History:   Diagnosis Date    Arthritis     Asthma     Bronchitis     CAD (coronary artery disease)     Cerebral artery occlusion with cerebral infarction (HCC)     Emphysema of lung (HCC)     Follicular acne     as stated per pt. \"it comes and goes. Nothing helps it. I've been trying to tell doctors i need an antiobiotic.  Hx of blood clots     DVT'S (as stated per pt)    Hypertension     Hypoglycemia     Knee joint pain     Other and unspecified hyperlipidemia     PVD (peripheral vascular disease) ([facility]     Sleep apnea     pt does not use cpap or bipap. Self diagnosed. Pt has not seen a physician about this.     Smoker     x 1 pack daily     Past Surgical History:   Procedure Laterality Date    APPENDECTOMY      CARPAL TUNNEL RELEASE Right     HEMORRHOID SURGERY      OTHER SURGICAL HISTORY  11/2015    Femoral stent placement    PAIN MANAGEMENT PROCEDURE N/A 2/11/2020    EPIDURAL STEROID INJECTION L3-4 performed by Deepak uGzman at [facility][1]      VASCULAR SURGERY      PTA/STENT LT FEMORAL ARTERY     Family History   Problem Relation Age of Onset    Cancer Father     Heart Disease Father     Heart Surgery Father     Diabetes Father     Hypertension Mother     Hypertension Sister     Heart Disease Sister     Stroke Maternal Grandmother     Heart Disease Maternal Grandmother     Heart Disease Paternal Uncle     Heart Disease Maternal Grandfather     Stroke Maternal Grandfather      Social History     Tobacco Use    Smoking redness and visual disturbance. Respiratory: Negative for cough, shortness of breath and wheezing. Cardiovascular: Positive for palpitations. Negative for chest pain and leg swelling. Gastrointestinal: Negative for abdominal distention, abdominal pain, blood in stool, nausea and vomiting. Endocrine: Negative for cold intolerance and heat intolerance. Genitourinary: Negative for dysuria and hematuria. Musculoskeletal: Positive for back pain. Negative for gait problem. Skin: Negative for color change, pallor and rash. Neurological: Negative for dizziness, syncope, facial asymmetry and light-headedness. Hematological: Does not bruise/bleed easily. Psychiatric/Behavioral: Negative for behavioral problems and confusion. All other systems reviewed and are negative. Objective  Vital Signs - Pulse 80   Ht 5' 8\" (1.727 m)   Wt 277 lb (125.6 kg)   SpO2 97%   BMI 42.12 kg/m²   Physical Exam  Vitals signs and nursing note reviewed. Constitutional:       Appearance: He is well-developed. He is obese. HENT:      Head: Normocephalic and atraumatic. Right Ear: External ear normal.      Left Ear: External ear normal.      Nose: Nose normal.   Eyes:      General:         Right eye: No discharge. Left eye: No discharge. Pupils: Pupils are equal, round, and reactive to light. Neck:      Musculoskeletal: Normal range of motion. No edema. Vascular: No carotid bruit or JVD. Trachea: No tracheal deviation. Cardiovascular:      Rate and Rhythm: Normal rate and regular rhythm. Heart sounds: Normal heart sounds. No murmur. No friction rub. No gallop. Pulmonary:      Effort: Pulmonary effort is normal. No respiratory distress. Breath sounds: No wheezing, rhonchi or rales. Abdominal:      General: Bowel sounds are normal. There is no distension. Palpations: Abdomen is soft. Tenderness: There is no abdominal tenderness.    Musculoskeletal: Normal range of motion. Skin:     General: Skin is warm and dry. Capillary Refill: Capillary refill takes less than 2 seconds. Findings: No rash. Neurological:      Mental Status: He is alert and oriented to person, place, and time. Psychiatric:         Behavior: Behavior normal.         Judgment: Judgment normal.         Cardiac data:    ECG 08/05/20  Sinus rhythm with a right bundle branch block, 88 bpm    QTc 0.441 ms    12/16/2014  DSE negative for myocardial ischemia  12/16/2014  Echo  Normal LVFX  1/30/2018  Echo  Normal LVFX  10/9/2019  Echo  Normal LVFX  10/10/2019  lexiscan Positive for inferior myocardial ischemia, EF 68%, low risk findings, AUC indication 15, AUC score 4, (MD Juan)   10/23/19  Cath  Long 70% LAD, 80% mid circ, 100% small ND RCA, normal LVFX  12/18/2019  \"It is unlikely that bypass surgery would be of benefit to this gentleman at this time. \" Horace Sami)  7/8/20  Cath  80% LCX, 2.75 x 23 IRLANDA, 40% long prox LAD    Assessment, Recommendations, & Plan:  63 y.o. male with      Diagnosis Orders   1. Coronary artery disease involving native coronary artery of native heart, angina presence unspecified     2. Essential hypertension     3. Other hyperlipidemia     4. Tobacco abuse disorder     5. Sinus pause         1. Coronary artery disease-recent stent placed to left circumflex. Patient has not been taking Effient. Discussed with patient importance of dual antiplatelet therapy for a year he voiced understanding however stated he would try it if he does not like the way it makes him feel he will stop taking it. He has picked up the prescription from the pharmacy and will start today. 2.  Essential hypertension-blood pressure today 136/84. Patient states he is recently started on hydralazine by PCP however he has not started taking it. Recommended patient to start taking the medication. 3.  Hyperlipidemia- managed by PCP    4.  Nicotine dependence- patient states he has cut back however is still smoking. Discussed the importance of quitting and encouraged to do so. 5.  Palpitations/sinus pause-discussed with the patient wearing heart monitor to assess for arrhythmias further. Patient denies at this time. Stating he has sleep apnea that has not been treated but he will undergo a sleep apnea test.  He states metoprolol and losartan are the only things that help with his blood pressure and he does not want to change medications. No orders of the defined types were placed in this encounter. No follow-ups on file. Call with any questionsor concerns  Follow up with PARVEZ Mueller for non cardiac problems  Report any new problems  Cardiovascular Fitness-Exercise as tolerated. Strive for 15 minutes of exercise most days of the week. Cardiac / HealthyDiet  Continue current medications as directed  Continue plan of treatment  It is always recommended that you bring your medicationsbottles with you to each visit - this is for your safety! Please do not hesitate to contact me for any questions or concerns. Sincerely yours,    PARVEZ Lujan    This dictation was generated by voice recognition computer software. Although all attempts are made to edit dictation for accuracy, there may be errors in the transcription that are not intended.

## 2020-08-05 NOTE — PATIENT INSTRUCTIONS
Orders Placed This Encounter   Procedures    EKG 12 lead     Order Specific Question:   Reason for Exam?     Answer:   Hypertension     Return in about 6 months (around 2/5/2021). Call with any questionsor concerns  Follow up with PARVEZ Ocasio for non cardiac problems  Report any new problems  Cardiovascular Fitness-Exercise as tolerated. Strive for 15 minutes of exercise most days of the week. Cardiac / HealthyDiet  Continue current medications as directed  Continue plan of treatment  It is always recommended that you bring your medicationsbottles with you to each visit - this is for your safety!

## 2020-08-06 ASSESSMENT — ENCOUNTER SYMPTOMS: SHORTNESS OF BREATH: 0

## 2020-08-24 ENCOUNTER — TELEPHONE (OUTPATIENT)
Dept: CARDIOLOGY | Age: 55
End: 2020-08-24

## 2020-08-24 NOTE — TELEPHONE ENCOUNTER
Mark Santamaria with [address] called to see what the dose is metoprolol. Advised per chart dose is 25 mg daily. HE voiced understanding.

## 2020-09-24 ENCOUNTER — TELEPHONE (OUTPATIENT)
Dept: PRIMARY CARE CLINIC | Age: 55
End: 2020-09-24

## 2020-09-24 NOTE — TELEPHONE ENCOUNTER
Pt called states he has visit coming up with back specialist and is requesting you order MRI of [place] and SI so he can have some fresh images for his specialist. Please advise. Thank you.

## 2020-10-01 ENCOUNTER — VIRTUAL VISIT (OUTPATIENT)
Dept: PRIMARY CARE CLINIC | Age: 55
End: 2020-10-01
Payer: MEDICARE

## 2020-10-01 ENCOUNTER — TELEPHONE (OUTPATIENT)
Dept: PRIMARY CARE CLINIC | Age: 55
End: 2020-10-01

## 2020-10-01 PROCEDURE — 99442 PR PHYS/QHP TELEPHONE EVALUATION 11-20 MIN: CPT | Performed by: NURSE PRACTITIONER

## 2020-10-01 RX ORDER — ALBUTEROL SULFATE 2.5 MG/3ML
2.5 SOLUTION RESPIRATORY (INHALATION) 4 TIMES DAILY
Qty: 120 EACH | Refills: 3 | Status: SHIPPED | OUTPATIENT
Start: 2020-10-01 | End: 2021-09-07

## 2020-10-01 RX ORDER — ALBUTEROL SULFATE 90 UG/1
AEROSOL, METERED RESPIRATORY (INHALATION)
Qty: 8.5 G | Refills: 0 | Status: SHIPPED | OUTPATIENT
Start: 2020-10-01 | End: 2021-03-23

## 2020-10-01 RX ORDER — LOSARTAN POTASSIUM 50 MG/1
TABLET ORAL
Qty: 180 TABLET | Refills: 2 | Status: SHIPPED | OUTPATIENT
Start: 2020-10-01 | End: 2021-06-22

## 2020-10-01 ASSESSMENT — ENCOUNTER SYMPTOMS
COLOR CHANGE: 0
NAUSEA: 0
SHORTNESS OF BREATH: 0
VOICE CHANGE: 0
PHOTOPHOBIA: 0
RHINORRHEA: 0
COUGH: 0
VOMITING: 0

## 2020-10-01 ASSESSMENT — PATIENT HEALTH QUESTIONNAIRE - PHQ9
SUM OF ALL RESPONSES TO PHQ QUESTIONS 1-9: 0
SUM OF ALL RESPONSES TO PHQ QUESTIONS 1-9: 0
SUM OF ALL RESPONSES TO PHQ9 QUESTIONS 1 & 2: 0
1. LITTLE INTEREST OR PLEASURE IN DOING THINGS: 0
2. FEELING DOWN, DEPRESSED OR HOPELESS: 0

## 2020-10-01 NOTE — PROGRESS NOTES
is a Patient Initiated Episode with an Established Patient who has not had a related appointment within my department in the past 7 days or scheduled within the next 24 hours. Total Time: minutes: 11-20 minutes      Note: not billable if this call serves to triage the patient into an appointment for the relevant concern      Jackelyn 86 to the emergency declaration under the [address] [place] [address] waiver authority and the [employer] Act, this Virtual  Visit was conducted, with patient's consent, to reduce the patient's risk of exposure to COVID-19 and provide continuity of care for an established patient.

## 2020-10-07 DIAGNOSIS — R53.82 CHRONIC FATIGUE: ICD-10-CM

## 2020-10-07 DIAGNOSIS — M25.50 ARTHRALGIA, UNSPECIFIED JOINT: ICD-10-CM

## 2020-10-07 DIAGNOSIS — I10 ESSENTIAL HYPERTENSION: ICD-10-CM

## 2020-10-07 LAB
ALBUMIN SERPL-MCNC: 3.9 G/DL (ref 3.5–5.2)
ALP BLD-CCNC: 123 U/L (ref 40–130)
ALT SERPL-CCNC: 24 U/L (ref 5–41)
ANION GAP SERPL CALCULATED.3IONS-SCNC: 15 MMOL/L (ref 7–19)
AST SERPL-CCNC: 16 U/L (ref 5–40)
BASOPHILS ABSOLUTE: 0.1 K/UL (ref 0–0.2)
BASOPHILS RELATIVE PERCENT: 0.8 % (ref 0–1)
BILIRUB SERPL-MCNC: <0.2 MG/DL (ref 0.2–1.2)
BUN BLDV-MCNC: 17 MG/DL (ref 6–20)
C-REACTIVE PROTEIN: 6.04 MG/DL (ref 0–0.5)
CALCIUM SERPL-MCNC: 9.4 MG/DL (ref 8.6–10)
CHLORIDE BLD-SCNC: 100 MMOL/L (ref 98–111)
CO2: 20 MMOL/L (ref 22–29)
CREAT SERPL-MCNC: 0.9 MG/DL (ref 0.5–1.2)
EOSINOPHILS ABSOLUTE: 1.9 K/UL (ref 0–0.6)
EOSINOPHILS RELATIVE PERCENT: 11.3 % (ref 0–5)
GFR AFRICAN AMERICAN: >59
GFR NON-AFRICAN AMERICAN: >60
GLUCOSE BLD-MCNC: 140 MG/DL (ref 74–109)
HCT VFR BLD CALC: 42.4 % (ref 42–52)
HEMOGLOBIN: 14 G/DL (ref 14–18)
IMMATURE GRANULOCYTES #: 0.1 K/UL
LYMPHOCYTES ABSOLUTE: 3.2 K/UL (ref 1.1–4.5)
LYMPHOCYTES RELATIVE PERCENT: 18.8 % (ref 20–40)
MCH RBC QN AUTO: 28.7 PG (ref 27–31)
MCHC RBC AUTO-ENTMCNC: 33 G/DL (ref 33–37)
MCV RBC AUTO: 87.1 FL (ref 80–94)
MONOCYTES ABSOLUTE: 1.5 K/UL (ref 0–0.9)
MONOCYTES RELATIVE PERCENT: 9.1 % (ref 0–10)
NEUTROPHILS ABSOLUTE: 10 K/UL (ref 1.5–7.5)
NEUTROPHILS RELATIVE PERCENT: 59.5 % (ref 50–65)
PDW BLD-RTO: 14 % (ref 11.5–14.5)
PLATELET # BLD: 542 K/UL (ref 130–400)
PMV BLD AUTO: 9.4 FL (ref 9.4–12.4)
POTASSIUM SERPL-SCNC: 4 MMOL/L (ref 3.5–5)
RBC # BLD: 4.87 M/UL (ref 4.7–6.1)
SEDIMENTATION RATE, ERYTHROCYTE: 60 MM/HR (ref 0–15)
SODIUM BLD-SCNC: 135 MMOL/L (ref 136–145)
TOTAL PROTEIN: 7.5 G/DL (ref 6.6–8.7)
WBC # BLD: 16.8 K/UL (ref 4.8–10.8)

## 2020-10-10 LAB
ANA IGG, ELISA: NORMAL
ENA TO SSB (LA) ANTIBODY: 0 AU/ML (ref 0–40)
SSA 52 (RO) (ENA) AB, IGG: 1 AU/ML (ref 0–40)

## 2020-10-12 ENCOUNTER — TELEPHONE (OUTPATIENT)
Dept: PRIMARY CARE CLINIC | Age: 55
End: 2020-10-12

## 2020-10-12 NOTE — TELEPHONE ENCOUNTER
Called patient,informed of lab results. patient request to wait on referral related to \"I am having back surgery in two weeks and it is a major surgery and just want to wait. I will call back when I am ready to schedule. \"

## 2020-10-12 NOTE — TELEPHONE ENCOUNTER
----- Message from PARVEZ Urbano sent at 10/12/2020  9:38 AM CDT -----  Sed rate and CRP elevated; these are inflammatory markers. Does he have appointment with rheumatology?  If not, okay to place referral.

## 2020-11-03 PROBLEM — I10 HYPERTENSION: Status: RESOLVED | Noted: 2020-11-03 | Resolved: 2020-11-03

## 2020-11-24 ENCOUNTER — TELEPHONE (OUTPATIENT)
Dept: PRIMARY CARE CLINIC | Age: 55
End: 2020-11-24

## 2020-11-24 NOTE — TELEPHONE ENCOUNTER
Pt states that a few months ago Correa Lori wanted to refer him to Rheumatology but he was supposed to be having back surgery and wanted to wait until after that before being referred. His back surgery has been postponed so pt would like to proceed with referral now.

## 2020-12-08 ENCOUNTER — TELEPHONE (OUTPATIENT)
Dept: PRIMARY CARE CLINIC | Age: 55
End: 2020-12-08

## 2020-12-08 NOTE — TELEPHONE ENCOUNTER
Pt called stating \" I NEED TO TALK TO DR MANNING, [address] DROPPED THE BALL\"  I asked patient what he was referring too? \" I dont think I can do this again, I cant even tell you. It should be in my chart, cant you see it? \"  I replied with Bib baron but and he started to talk over top of me and stating he has been waiting months his joints are attacking themselves. I let him know we didn't drop the ball we actually sent this twice and he could call them if he wanted.   I finally gave the phone number to him and let him know to call us if its to far out and he wanted to travel let him know we could refer somewhere else but it would be to [facility] [place]

## 2020-12-16 ENCOUNTER — OFFICE VISIT (OUTPATIENT)
Dept: CARDIOLOGY | Age: 55
End: 2020-12-16
Payer: MEDICARE

## 2020-12-16 VITALS
HEIGHT: 68 IN | OXYGEN SATURATION: 97 % | BODY MASS INDEX: 40.92 KG/M2 | HEART RATE: 93 BPM | SYSTOLIC BLOOD PRESSURE: 128 MMHG | DIASTOLIC BLOOD PRESSURE: 74 MMHG | WEIGHT: 270 LBS

## 2020-12-16 PROCEDURE — 4004F PT TOBACCO SCREEN RCVD TLK: CPT | Performed by: NURSE PRACTITIONER

## 2020-12-16 PROCEDURE — G8427 DOCREV CUR MEDS BY ELIG CLIN: HCPCS | Performed by: NURSE PRACTITIONER

## 2020-12-16 PROCEDURE — G8484 FLU IMMUNIZE NO ADMIN: HCPCS | Performed by: NURSE PRACTITIONER

## 2020-12-16 PROCEDURE — G8417 CALC BMI ABV UP PARAM F/U: HCPCS | Performed by: NURSE PRACTITIONER

## 2020-12-16 PROCEDURE — 3017F COLORECTAL CA SCREEN DOC REV: CPT | Performed by: NURSE PRACTITIONER

## 2020-12-16 PROCEDURE — 99213 OFFICE O/P EST LOW 20 MIN: CPT | Performed by: NURSE PRACTITIONER

## 2020-12-16 ASSESSMENT — ENCOUNTER SYMPTOMS
VOMITING: 0
COUGH: 0
TROUBLE SWALLOWING: 0
BLOOD IN STOOL: 0
EYE REDNESS: 0
SHORTNESS OF BREATH: 0
EYE DISCHARGE: 0
FACIAL SWELLING: 0
ABDOMINAL DISTENTION: 0
COLOR CHANGE: 0
BACK PAIN: 1
ABDOMINAL PAIN: 0
WHEEZING: 0
NAUSEA: 0

## 2020-12-16 NOTE — PROGRESS NOTES
[address] [place] [address] Cardiology  [address]  [address]  Phone: [phone]  Fax: [phone]    OFFICE VISIT:  2020    Renetta Tong - : 1965    Reason For Visit:  Devan Rojas is a 63 y.o. male who is here for 6 Month Follow-Up (SOB the same,still elevated BP), Coronary Artery Disease, and Hypertension      HPI    Mr. Bryan Mina is a 63 y.o. male with history of coronary artery disease, hypertension, hyperlipidemia, nicotine dependence, a family history of CAD, and BRITNI who presents with the chief complaint of 6-month follow-up. On 2020 patient underwent cardiac catheterization. Patient received drug-eluting stent to his left circumflex. Multiple pauses were recorded on telemetry during patient stay and he left AMA prior to these being addressed. Patient states since previous appointment he has not been doing well. He is waiting to get into a rheumatologist.  His back surgery was canceled. His breathing is unchanged. He did state that his palpitations were better. Devan Rojas has no exertional chest pain, pressure, burning, tightness or squeezing. No symptomatic tachy- or deandra-arrhythmia. No lightheadedness, dizziness, or syncope. No numbness or weakness to suggest cerebrovascular accident or transient ischemic attack. he denies signs of bleeding. Reports no edema. He denies orthopnea or paroxysmal nocturnal dyspnea. he has not been compliant with his medications. PCP follows lipids and labs. PARVEZ Inman is PCP.   Renettakelvin Tong has the following history as recorded in [facility]:    Patient Active Problem List    Diagnosis Date Noted    Sinus pause 2020    Tobacco abuse disorder 2020    Xanthelasma of eyelid, bilateral 01/10/2020    Coronary artery disease involving native coronary artery of native heart 2019    History of sleep apnea 2019    History of splenectomy 2019    Leukocytosis 2019  Essential hypertension 04/14/2016    Other hyperlipidemia      Past Medical History:   Diagnosis Date    Arthritis     Asthma     Bronchitis     CAD (coronary artery disease)     Cerebral artery occlusion with cerebral infarction ([facility] [address]     Emphysema of lung (HCC)     Follicular acne     as stated per pt. \"it comes and goes. Nothing helps it. I've been trying to tell doctors i need an antiobiotic.  Hx of blood clots     DVT'S (as stated per pt)    Hypertension     Hypoglycemia     Knee joint pain     Other and unspecified hyperlipidemia     PVD (peripheral vascular disease) ([facility] [address]     Sleep apnea     pt does not use cpap or bipap. Self diagnosed. Pt has not seen a physician about this.     Smoker     x 1 pack daily     Past Surgical History:   Procedure Laterality Date    APPENDECTOMY      CARPAL TUNNEL RELEASE Right     DIAGNOSTIC CARDIAC CATH LAB PROCEDURE      7/2020    HEMORRHOID SURGERY      OTHER SURGICAL HISTORY  11/2015    Femoral stent placement    PAIN MANAGEMENT PROCEDURE N/A 2/11/2020    EPIDURAL STEROID INJECTION L3-4 performed by [facility] at [facility]      VASCULAR SURGERY      PTA/STENT LT FEMORAL ARTERY     Family History   Problem Relation Age of Onset    Cancer Father     Heart Disease Father     Heart Surgery Father     Diabetes Father     Hypertension Mother     Hypertension Sister     Heart Disease Sister     Stroke Maternal Grandmother     Heart Disease Maternal Grandmother     Heart Disease Paternal Uncle     Heart Disease Maternal Grandfather     Stroke Maternal Grandfather      Social History     Tobacco Use    Smoking status: Current Every Day Smoker     Packs/day: 1.00     Years: 38.00     Pack years: 38.00     Start date: 11/26/1981    Smokeless tobacco: Never Used   Substance Use Topics    Alcohol use: No      Current Outpatient Medications   Medication Sig Dispense Refill  losartan (COZAAR) 50 MG tablet TAKE ONE TABLET BY MOUTH 2 TIMES A DAY IN THE MORNING AND EVENING 180 tablet 2    metFORMIN (GLUCOPHAGE) 500 MG tablet Take 1 tablet by mouth 2 times daily (with meals) Do no ttake medication for 72 hrs after heart cath. Can resume on 7/12/2020 180 tablet 3    albuterol sulfate HFA (PROAIR HFA) 108 (90 Base) MCG/ACT inhaler INHALE 2 PUFFS BY MOUTH EVERY 6 HOURS AS NEEDED 8.5 g 0    albuterol (PROVENTIL) (2.5 MG/3ML) 0.083% nebulizer solution Take 3 mLs by nebulization 4 times daily 120 each 3    loteprednol (LOTEMAX) 0.5 % ophthalmic suspension Place 2 drops into both eyes daily      metoprolol succinate (TOPROL XL) 25 MG extended release tablet Take 1 tablet by mouth daily 30 tablet 3    prasugrel (EFFIENT) 10 MG TABS Take 1 tablet by mouth daily 30 tablet 5    phenylephrine (RICK-SYNEPHRINE) 1 % nasal spray 1 drop by Nasal route every 4 hours as needed for Congestion       No current facility-administered medications for this visit. Allergies: Iodine, Lisinopril, and Shellfish-derived products    Review of Systems  Review of Systems   Constitutional: Negative for activity change, diaphoresis, fatigue, fever and unexpected weight change. HENT: Negative for facial swelling, nosebleeds and trouble swallowing. Eyes: Negative for discharge, redness and visual disturbance. Respiratory: Negative for cough, shortness of breath and wheezing. Cardiovascular: Negative for chest pain, palpitations and leg swelling. Gastrointestinal: Negative for abdominal distention, abdominal pain, blood in stool, nausea and vomiting. Endocrine: Negative for cold intolerance and heat intolerance. Genitourinary: Negative for dysuria and hematuria. Musculoskeletal: Positive for back pain. Negative for gait problem. Skin: Negative for color change, pallor and rash. Neurological: Negative for dizziness, syncope, facial asymmetry and light-headedness. Hematological: Does not bruise/bleed easily. Psychiatric/Behavioral: Negative for behavioral problems and confusion. All other systems reviewed and are negative. Objective  Vital Signs - /74   Pulse 93   Ht 5' 8\" (1.727 m)   Wt 270 lb (122.5 kg)   SpO2 97%   BMI 41.05 kg/m²   Physical Exam  Vitals signs and nursing note reviewed. Constitutional:       Appearance: He is well-developed. He is obese. HENT:      Head: Normocephalic and atraumatic. Right Ear: External ear normal.      Left Ear: External ear normal.      Nose: Nose normal.   Eyes:      General:         Right eye: No discharge. Left eye: No discharge. Pupils: Pupils are equal, round, and reactive to light. Neck:      Musculoskeletal: Normal range of motion. No edema. Vascular: No carotid bruit or JVD. Trachea: No tracheal deviation. Cardiovascular:      Rate and Rhythm: Normal rate and regular rhythm. Heart sounds: Normal heart sounds. No murmur. No friction rub. No gallop. Pulmonary:      Effort: Pulmonary effort is normal. No respiratory distress. Breath sounds: No wheezing, rhonchi or rales. Abdominal:      General: Bowel sounds are normal. There is no distension. Palpations: Abdomen is soft. Tenderness: There is no abdominal tenderness. Musculoskeletal: Normal range of motion. Skin:     General: Skin is warm and dry. Capillary Refill: Capillary refill takes less than 2 seconds. Findings: No rash. Neurological:      Mental Status: He is alert and oriented to person, place, and time.    Psychiatric:         Behavior: Behavior normal.         Judgment: Judgment normal.         Cardiac data:    ECG 12/16/20  Sinus rhythm with a right bundle branch block, 88 bpm    QTc 0.441 ms    12/16/2014  DSE negative for myocardial ischemia  12/16/2014  Echo  Normal LVFX  1/30/2018  Echo  Normal LVFX  10/9/2019  Echo  Normal LVFX 10/10/2019  lexiscan Positive for inferior myocardial ischemia, EF 68%, low risk findings, AUC indication 15, AUC score 4, (MD Juan)   10/23/19  Cath  Long 70% LAD, 80% mid circ, 100% small ND RCA, normal LVFX  12/18/2019  \"It is unlikely that bypass surgery would be of benefit to this gentleman at this time. \" Ivette Roldan)  7/8/20  Cath  80% LCX, 2.75 x 23 IRLANDA, 40% long prox LAD    Assessment, Recommendations, & Plan:  63 y.o. male with      Diagnosis Orders   1. Coronary artery disease involving native coronary artery of native heart, angina presence unspecified     2. Essential hypertension     3. Other hyperlipidemia     4. Cigarette nicotine dependence without complication     5. Sinus pause         1. Coronary artery disease-patient has started taking his Effient however has stopped taking his aspirin. Patient is noncompliant with medications and self medicates. 2.  Essential hypertension-blood pressure today 128/74. No changes made    3. Hyperlipidemia- managed by PCP    4. Nicotine dependence-patient continues to smoke and is not ready to quit. Encouraged to quit however patient states with everything going on his life right now he is not going to. No orders of the defined types were placed in this encounter. Return in about 6 months (around 6/16/2021). Call with any questionsor concerns  Follow up with PARVEZ Chacko for non cardiac problems  Report any new problems  Cardiovascular Fitness-Exercise as tolerated. Strive for 15 minutes of exercise most days of the week. Cardiac / HealthyDiet  Continue current medications as directed  Continue plan of treatment  It is always recommended that you bring your medicationsbottles with you to each visit - this is for your safety! Please do not hesitate to contact me for any questions or concerns.     Sincerely yours,    PARVEZ Marie This dictation was generated by voice recognition computer software. Although all attempts are made to edit dictation for accuracy, there may be errors in the transcription that are not intended.

## 2020-12-16 NOTE — PATIENT INSTRUCTIONS
No orders of the defined types were placed in this encounter. Return in about 6 months (around 6/16/2021). Call with any questionsor concerns  Follow up with PARVEZ Choi for non cardiac problems  Report any new problems  Cardiovascular Fitness-Exercise as tolerated. Strive for 15 minutes of exercise most days of the week. Cardiac / HealthyDiet  Continue current medications as directed  Continue plan of treatment  It is always recommended that you bring your medicationsbottles with you to each visit - this is for your safety!

## 2021-02-03 ENCOUNTER — OFFICE VISIT (OUTPATIENT)
Dept: PRIMARY CARE CLINIC | Age: 56
End: 2021-02-03
Payer: MEDICARE

## 2021-02-03 VITALS
OXYGEN SATURATION: 96 % | HEIGHT: 68 IN | TEMPERATURE: 97.6 F | HEART RATE: 100 BPM | BODY MASS INDEX: 40.59 KG/M2 | DIASTOLIC BLOOD PRESSURE: 88 MMHG | RESPIRATION RATE: 20 BRPM | WEIGHT: 267.8 LBS | SYSTOLIC BLOOD PRESSURE: 152 MMHG

## 2021-02-03 DIAGNOSIS — J44.9 CHRONIC OBSTRUCTIVE PULMONARY DISEASE, UNSPECIFIED COPD TYPE (HCC): ICD-10-CM

## 2021-02-03 DIAGNOSIS — I25.118 CORONARY ARTERY DISEASE OF NATIVE ARTERY OF NATIVE HEART WITH STABLE ANGINA PECTORIS (HCC): ICD-10-CM

## 2021-02-03 DIAGNOSIS — G89.4 CHRONIC PAIN SYNDROME: ICD-10-CM

## 2021-02-03 DIAGNOSIS — R73.03 PREDIABETES: ICD-10-CM

## 2021-02-03 DIAGNOSIS — Z12.11 COLON CANCER SCREENING: Primary | ICD-10-CM

## 2021-02-03 DIAGNOSIS — E66.01 CLASS 3 SEVERE OBESITY WITH BODY MASS INDEX (BMI) OF 40.0 TO 44.9 IN ADULT, UNSPECIFIED OBESITY TYPE, UNSPECIFIED WHETHER SERIOUS COMORBIDITY PRESENT (HCC): ICD-10-CM

## 2021-02-03 PROBLEM — E66.813 CLASS 3 SEVERE OBESITY WITH BODY MASS INDEX (BMI) OF 40.0 TO 44.9 IN ADULT: Status: ACTIVE | Noted: 2021-02-03

## 2021-02-03 PROBLEM — J37.0 CHRONIC LARYNGITIS: Status: ACTIVE | Noted: 2019-06-24

## 2021-02-03 PROBLEM — R09.89 GLOBUS SENSATION: Status: ACTIVE | Noted: 2019-06-24

## 2021-02-03 PROBLEM — R09.A2 GLOBUS SENSATION: Status: ACTIVE | Noted: 2019-06-24

## 2021-02-03 LAB — HBA1C MFR BLD: 5.5 %

## 2021-02-03 PROCEDURE — 4004F PT TOBACCO SCREEN RCVD TLK: CPT | Performed by: NURSE PRACTITIONER

## 2021-02-03 PROCEDURE — 3023F SPIROM DOC REV: CPT | Performed by: NURSE PRACTITIONER

## 2021-02-03 PROCEDURE — 83036 HEMOGLOBIN GLYCOSYLATED A1C: CPT | Performed by: NURSE PRACTITIONER

## 2021-02-03 PROCEDURE — G8484 FLU IMMUNIZE NO ADMIN: HCPCS | Performed by: NURSE PRACTITIONER

## 2021-02-03 PROCEDURE — G8427 DOCREV CUR MEDS BY ELIG CLIN: HCPCS | Performed by: NURSE PRACTITIONER

## 2021-02-03 PROCEDURE — 3017F COLORECTAL CA SCREEN DOC REV: CPT | Performed by: NURSE PRACTITIONER

## 2021-02-03 PROCEDURE — G8926 SPIRO NO PERF OR DOC: HCPCS | Performed by: NURSE PRACTITIONER

## 2021-02-03 PROCEDURE — G8417 CALC BMI ABV UP PARAM F/U: HCPCS | Performed by: NURSE PRACTITIONER

## 2021-02-03 PROCEDURE — 99214 OFFICE O/P EST MOD 30 MIN: CPT | Performed by: NURSE PRACTITIONER

## 2021-02-03 SDOH — ECONOMIC STABILITY: TRANSPORTATION INSECURITY
IN THE PAST 12 MONTHS, HAS THE LACK OF TRANSPORTATION KEPT YOU FROM MEDICAL APPOINTMENTS OR FROM GETTING MEDICATIONS?: NO

## 2021-02-03 SDOH — ECONOMIC STABILITY: FOOD INSECURITY: WITHIN THE PAST 12 MONTHS, THE FOOD YOU BOUGHT JUST DIDN'T LAST AND YOU DIDN'T HAVE MONEY TO GET MORE.: NEVER TRUE

## 2021-02-03 SDOH — ECONOMIC STABILITY: TRANSPORTATION INSECURITY
IN THE PAST 12 MONTHS, HAS LACK OF TRANSPORTATION KEPT YOU FROM MEETINGS, WORK, OR FROM GETTING THINGS NEEDED FOR DAILY LIVING?: NO

## 2021-02-03 ASSESSMENT — ENCOUNTER SYMPTOMS
RHINORRHEA: 0
PHOTOPHOBIA: 0
VOMITING: 0
COUGH: 0
NAUSEA: 0
VOICE CHANGE: 0
COLOR CHANGE: 0
SHORTNESS OF BREATH: 0
BACK PAIN: 1

## 2021-02-03 ASSESSMENT — PATIENT HEALTH QUESTIONNAIRE - PHQ9
SUM OF ALL RESPONSES TO PHQ9 QUESTIONS 1 & 2: 2
SUM OF ALL RESPONSES TO PHQ QUESTIONS 1-9: 2

## 2021-02-03 NOTE — PROGRESS NOTES
[address] [facility] CARE  [address] [id number] [address] [address]  Dept: [phone]  Dept Fax: [phone]  Loc: [phone]    Ariane Plunkett is a 63 y.o. male who presents today for his medical conditions/complaints as noted below. Ariane Plunkett is c/o of Hypertension (he states he has stopped most of his medications to see if they are contributing to his health problems), Back Pain (he states he was scheduled for back surgery, but was rescheduled and does not want to have anything done at this time), and Joint Pain (he is working with his rheumatologist to see if he can help his joint pain)        HPI:     HPI   Chief Complaint   Patient presents with    Hypertension     he states he has stopped most of his medications to see if they are contributing to his health problems    Back Pain     he states he was scheduled for back surgery, but was rescheduled and does not want to have anything done at this time    Joint Pain     he is working with his rheumatologist to see if he can help his joint pain     Patient presents today for follow-up hypertension, arthritis, hyperlipidemia, CAD. His blood pressure is elevated in office today. He has discontinued most of his medications; he is still taking Losartan 50 mg; he states BP has been normal at all other visits. He is seeing rheumatology; Dr Gely Gaspar. Inflammatory markers are all elevated. He is supposed to see him back in 2-3 weeks. He states as of right now \"nothing is being done about all his pain\". However, patient adamantly refuses any medication or therapy. He states he has been on narcotics for many years in the past and \"nothing touches my pain\". He was supposed to have had back surgery; he states that this has been cancelled. He is upset about the outcome of this. He sees cardiology; had a stent placed in July of 2020. He has discontinued his effient.        Past Medical History:   Diagnosis Date  Arthritis     Asthma     Bronchitis     CAD (coronary artery disease)     Cerebral artery occlusion with cerebral infarction (HCC)     Chronic obstructive pulmonary disease ([facility] [address] 2/3/2021    Emphysema of lung (HCC)     Follicular acne     as stated per pt. \"it comes and goes. Nothing helps it. I've been trying to tell doctors i need an antiobiotic.  Hx of blood clots     DVT'S (as stated per pt)    Hypertension     Hypoglycemia     Knee joint pain     Other and unspecified hyperlipidemia     PVD (peripheral vascular disease) ([facility] [address]     Sleep apnea     pt does not use cpap or bipap. Self diagnosed. Pt has not seen a physician about this.     Smoker     x 1 pack daily      Past Surgical History:   Procedure Laterality Date    APPENDECTOMY      CARPAL TUNNEL RELEASE Right     DIAGNOSTIC CARDIAC CATH LAB PROCEDURE      7/2020    HEMORRHOID SURGERY      OTHER SURGICAL HISTORY  11/2015    Femoral stent placement    PAIN MANAGEMENT PROCEDURE N/A 2/11/2020    EPIDURAL STEROID INJECTION L3-4 performed by [facility] at [facility]      VASCULAR SURGERY      PTA/STENT LT FEMORAL ARTERY       Vitals 2/3/2021 2/3/2021 12/16/2020 8/5/2020 8/5/2020 3/04/9552   SYSTOLIC 077 161 [phone]   DIASTOLIC 88 92 74 84 84 99   Pulse - 100 93 - 89 92   Temp - 97.6 - - - -   Resp - 20 - - - 18   SpO2 - 96 97 - 97 97   Weight - 267 lb 12.8 oz 270 lb - 277 lb -   Height - 5' 8\" 5' 8\" - 5' 8\" -   Body mass index - 40.71 kg/m2 41.05 kg/m2 - 42.11 kg/m2 -   Pain Level - - - - - -   Some recent data might be hidden       Family History   Problem Relation Age of Onset    Cancer Father     Heart Disease Father     Heart Surgery Father     Diabetes Father     Hypertension Mother     Hypertension Sister     Heart Disease Sister     Stroke Maternal Grandmother     Heart Disease Maternal Grandmother     Heart Disease Paternal Uncle     Heart Disease Maternal Grandfather  Stroke Maternal Grandfather        Social History     Tobacco Use    Smoking status: Current Every Day Smoker     Packs/day: 1.00     Years: 38.00     Pack years: 38.00     Start date: 11/26/1981    Smokeless tobacco: Never Used   Substance Use Topics    Alcohol use: No      Current Outpatient Medications   Medication Sig Dispense Refill    losartan (COZAAR) 50 MG tablet TAKE ONE TABLET BY MOUTH 2 TIMES A DAY IN THE MORNING AND EVENING 180 tablet 2    albuterol sulfate HFA (PROAIR HFA) 108 (90 Base) MCG/ACT inhaler INHALE 2 PUFFS BY MOUTH EVERY 6 HOURS AS NEEDED 8.5 g 0    albuterol (PROVENTIL) (2.5 MG/3ML) 0.083% nebulizer solution Take 3 mLs by nebulization 4 times daily 120 each 3    loteprednol (LOTEMAX) 0.5 % ophthalmic suspension Place 2 drops into both eyes daily      phenylephrine (RICK-SYNEPHRINE) 1 % nasal spray 1 drop by Nasal route every 4 hours as needed for Congestion       No current facility-administered medications for this visit.       Allergies   Allergen Reactions    Iodine      Iodine containing multi vitamin    Lisinopril      cough    Shellfish-Derived Products Swelling       Health Maintenance   Topic Date Due    Hepatitis C screen  1965    Meningococcal (ACWY) vaccine (1 - Risk start before 7 months 4-dose series) 1965    Hib vaccine (1 of 1 - Risk 1-dose series) 08/25/1965    Meningococcal B vaccine (1 of 4 - Increased Risk Bexsero 2-dose series) 05/25/1974    HIV screen  05/25/1979    DTaP/Tdap/Td vaccine (1 - Tdap) 05/25/1983    Colon cancer screen colonoscopy  05/25/2014    Low dose CT lung screening  05/25/2019    Annual Wellness Visit (AWV)  06/23/2019    Flu vaccine (1) 02/03/2022 (Originally 9/1/2020)    Shingles Vaccine (1 of 2) 02/03/2022 (Originally 5/25/2014)    Pneumococcal 0-64 years Vaccine (1 of 3 - PCV13) 02/03/2022 (Originally 5/25/1970)    A1C test (Diabetic or Prediabetic)  05/26/2021    Potassium monitoring  10/07/2021  Creatinine monitoring  10/07/2021    Lipid screen  08/04/2025    Hepatitis A vaccine  Aged Out    Hepatitis B vaccine  Aged Out       Subjective:      Review of Systems   Constitutional: Negative for chills and fever. HENT: Negative for ear pain, hearing loss, rhinorrhea and voice change. Eyes: Negative for photophobia and visual disturbance. Respiratory: Negative for cough and shortness of breath. Cardiovascular: Negative for chest pain and palpitations. Gastrointestinal: Negative for nausea and vomiting. Endocrine: Negative. Negative for cold intolerance and heat intolerance. Genitourinary: Negative for difficulty urinating and flank pain. Musculoskeletal: Positive for arthralgias and back pain. Negative for neck pain. Skin: Negative for color change and rash. Allergic/Immunologic: Negative for environmental allergies and food allergies. Neurological: Negative for dizziness, speech difficulty and headaches. Hematological: Does not bruise/bleed easily. Psychiatric/Behavioral: Negative for sleep disturbance and suicidal ideas. Objective:     Physical Exam  Vitals signs and nursing note reviewed. Constitutional:       Appearance: He is well-developed. HENT:      Head: Atraumatic. Right Ear: External ear normal.      Left Ear: External ear normal.      Nose: Nose normal.   Eyes:      Conjunctiva/sclera: Conjunctivae normal.      Pupils: Pupils are equal, round, and reactive to light. Neck:      Musculoskeletal: Normal range of motion and neck supple. Cardiovascular:      Rate and Rhythm: Normal rate and regular rhythm. Heart sounds: Normal heart sounds, S1 normal and S2 normal.   Pulmonary:      Effort: Pulmonary effort is normal.      Breath sounds: Normal breath sounds. Abdominal:      General: Bowel sounds are normal.      Palpations: Abdomen is soft. Musculoskeletal: Normal range of motion. Skin:     General: Skin is warm and dry.    Neurological: We are committed to providing you with the best care possible. In order to help us achieve these goals please remember to bring all medications, herbal products, and over the counter supplements with you to each visit. If your provider has ordered testing for you, please be sure to follow up with our office if you have not received results within 7 days after the testing took place. *If you receive a survey after visiting one of our offices, please take time to share your experience concerning your physician office visit. These surveys are confidential and no health information about you is shared. We are eager to improve for you and we are counting on your feedback to help make that happen. Electronically signed by PARVEZ Dodson on 2/3/2021 at 9:21 AM    EMR Dragon/transcription disclaimer:  Much of thisencounter note is electronic transcription/translation of spoken language to printed texts. The electronic translation of spoken language may be erroneous, or at times, nonsensical words or phrases may be inadvertentlytranscribed.   Although I have reviewed the note for such errors, some may still exist.

## 2021-03-01 ENCOUNTER — VIRTUAL VISIT (OUTPATIENT)
Dept: PRIMARY CARE CLINIC | Age: 56
End: 2021-03-01
Payer: MEDICARE

## 2021-03-01 DIAGNOSIS — I10 ESSENTIAL HYPERTENSION: Primary | ICD-10-CM

## 2021-03-01 PROCEDURE — 99443 PR PHYS/QHP TELEPHONE EVALUATION 21-30 MIN: CPT | Performed by: NURSE PRACTITIONER

## 2021-03-01 RX ORDER — METOPROLOL SUCCINATE 25 MG/1
25 TABLET, EXTENDED RELEASE ORAL DAILY
Qty: 30 TABLET | Refills: 5 | Status: SHIPPED | OUTPATIENT
Start: 2021-03-01 | End: 2021-08-19

## 2021-03-01 ASSESSMENT — ENCOUNTER SYMPTOMS
COLOR CHANGE: 0
VOICE CHANGE: 0
VOMITING: 0
BACK PAIN: 0
SHORTNESS OF BREATH: 0
COUGH: 0
NAUSEA: 0
PHOTOPHOBIA: 0
RHINORRHEA: 0

## 2021-03-01 NOTE — PROGRESS NOTES
[address] [address]            Phone:  [phone]  Fax:  [phone]    Joy Martinez is a 63 y.o. male evaluated via telephone on 3/1/2021. Consent:    He and/or health care decision maker is aware that that he may receive a bill for this telephone service, depending on his insurance coverage, and has provided verbal consent to proceed: Yes      HPI  Chief Complaint   Patient presents with    Hypertension     Patient presents today for follow-up hypertension. Patient had stopped taking medications on his own; he noted blood pressure elevated and started taking some metoprolol he had; it was 50 mg. He states it made him feel bad but helped blood pressure. He is requesting to take 25 mg metoprolol daily. He also reports he was seen by rheumatology and started on hydroxychloroquin; he states this has made him feel sick and so he stopped it. He has called Dr Kimberly Villeda office and is waiting for a phone call back. Review of Systems   Constitutional: Negative for chills and fever. HENT: Negative for ear pain, hearing loss, rhinorrhea and voice change. Eyes: Negative for photophobia and visual disturbance. Respiratory: Negative for cough and shortness of breath. Cardiovascular: Negative for chest pain and palpitations. Gastrointestinal: Negative for nausea and vomiting. Endocrine: Negative. Negative for cold intolerance and heat intolerance. Genitourinary: Negative for difficulty urinating and flank pain. Musculoskeletal: Negative for back pain and neck pain. Skin: Negative for color change and rash. Allergic/Immunologic: Negative for environmental allergies and food allergies. Neurological: Negative for dizziness, speech difficulty and headaches. Hematological: Does not bruise/bleed easily. Psychiatric/Behavioral: Negative for sleep disturbance and suicidal ideas.        Patient location: home    PLAN Details of this discussion including any medical advice provided:     1. Essential hypertension    - metoprolol succinate (TOPROL XL) 25 MG extended release tablet; Take 1 tablet by mouth daily  Dispense: 30 tablet; Refill: 5       I affirm this is a Patient Initiated Episode with an Established Patient who has not had a related appointment within my department in the past 7 days or scheduled within the next 24 hours. Total Time: minutes: 21-30 minutes      Note: not billable if this call serves to triage the patient into an appointment for the relevant concern      [address] to the emergency declaration under the [facility], [facility] waiver authority and the [employer] Act, this Virtual  Visit was conducted, with patient's consent, to reduce the patient's risk of exposure to COVID-19 and provide continuity of care for an established patient.

## 2021-03-23 RX ORDER — ALBUTEROL SULFATE 90 UG/1
AEROSOL, METERED RESPIRATORY (INHALATION)
Qty: 8.5 G | Refills: 5 | Status: SHIPPED | OUTPATIENT
Start: 2021-03-23 | End: 2021-10-07

## 2021-04-06 ENCOUNTER — TELEPHONE (OUTPATIENT)
Dept: PRIMARY CARE CLINIC | Age: 56
End: 2021-04-06

## 2021-04-06 NOTE — TELEPHONE ENCOUNTER
Has he followed up with rheumatology? I do not think he can take nsaids; okay for medrol dose pack for acute flare of OA. If his hands are going numb we need to discuss possible carpel tunnel syndrome, neuropathy, etc. Please schedule for appt.

## 2021-04-07 RX ORDER — METHYLPREDNISOLONE 4 MG/1
TABLET ORAL
Qty: 1 KIT | Refills: 0 | Status: SHIPPED | OUTPATIENT
Start: 2021-04-07 | End: 2021-04-13

## 2021-04-19 ENCOUNTER — OFFICE VISIT (OUTPATIENT)
Dept: PRIMARY CARE CLINIC | Age: 56
End: 2021-04-19
Payer: MEDICARE

## 2021-04-19 VITALS
HEIGHT: 68 IN | SYSTOLIC BLOOD PRESSURE: 164 MMHG | BODY MASS INDEX: 40.8 KG/M2 | TEMPERATURE: 97.1 F | DIASTOLIC BLOOD PRESSURE: 82 MMHG | HEART RATE: 86 BPM | OXYGEN SATURATION: 96 % | RESPIRATION RATE: 18 BRPM | WEIGHT: 269.2 LBS

## 2021-04-19 DIAGNOSIS — M19.90 ARTHRITIS: ICD-10-CM

## 2021-04-19 DIAGNOSIS — G62.9 NEUROPATHY: ICD-10-CM

## 2021-04-19 DIAGNOSIS — Z00.00 ROUTINE GENERAL MEDICAL EXAMINATION AT A HEALTH CARE FACILITY: Primary | ICD-10-CM

## 2021-04-19 PROCEDURE — 4004F PT TOBACCO SCREEN RCVD TLK: CPT | Performed by: NURSE PRACTITIONER

## 2021-04-19 PROCEDURE — 99213 OFFICE O/P EST LOW 20 MIN: CPT | Performed by: NURSE PRACTITIONER

## 2021-04-19 PROCEDURE — G8417 CALC BMI ABV UP PARAM F/U: HCPCS | Performed by: NURSE PRACTITIONER

## 2021-04-19 PROCEDURE — G8427 DOCREV CUR MEDS BY ELIG CLIN: HCPCS | Performed by: NURSE PRACTITIONER

## 2021-04-19 PROCEDURE — 3017F COLORECTAL CA SCREEN DOC REV: CPT | Performed by: NURSE PRACTITIONER

## 2021-04-19 PROCEDURE — G0438 PPPS, INITIAL VISIT: HCPCS | Performed by: NURSE PRACTITIONER

## 2021-04-19 RX ORDER — SULFASALAZINE 500 MG/1
TABLET ORAL
COMMUNITY
Start: 2021-04-06 | End: 2022-02-28

## 2021-04-19 RX ORDER — HYDROXYCHLOROQUINE SULFATE 200 MG/1
TABLET, FILM COATED ORAL
COMMUNITY
Start: 2021-04-16

## 2021-04-19 ASSESSMENT — ENCOUNTER SYMPTOMS
NAUSEA: 0
VOICE CHANGE: 0
PHOTOPHOBIA: 0
COLOR CHANGE: 0
COUGH: 0
RHINORRHEA: 0
VOMITING: 0
BACK PAIN: 0
SHORTNESS OF BREATH: 0

## 2021-04-19 ASSESSMENT — PATIENT HEALTH QUESTIONNAIRE - PHQ9
2. FEELING DOWN, DEPRESSED OR HOPELESS: 1
SUM OF ALL RESPONSES TO PHQ9 QUESTIONS 1 & 2: 2

## 2021-04-19 NOTE — PROGRESS NOTES
[address] [facility]  [address] [address]  [address] [address]  Dept: [phone]  Dept Fax: [phone]  Loc: [phone]    Riana Newton is a 63 y.o. male who presents today for his medical conditions/complaints as noted below. Riana Newton is c/o of Hand Pain (both hands seem to be bothering him; he states he tried the steroid pack and it did not help much; he has had tunnel release on his right hand in the past) and Hypertension      HPI:     HPI   Chief Complaint   Patient presents with    Hand Pain     both hands seem to be bothering him; he states he tried the steroid pack and it did not help much; he has had tunnel release on his right hand in the past    Hypertension     Patient presents today for evaluation of bilateral hand pain. He has had previous carpal tunnel release (right). He was given medrol dose pack which he states was not helpful. He has been seeing rheumatology and it was suspected he had inflammatory joint disease or possible gout. No nsaids given CAD, however, he states he taking several Aleve everyday for pain. He was started on hydroxychloroquine but this made him sick so he stopped taking it; he has resumed this and states this time it is not making him sick and it is in fact helping his pain. He has been given gabapentin in the past but no longer taking; he has this at home. We discussed trying this for his neuropathy he describes. Also discussed possibility of nerve conduction study, however, he states he does not want to have this done due to cost.    He reports there was an area of concern on MRI for lung nodule and a CT chest was ordered, again, he does not want to have this done due to cost and states \"if I was concerned enough about it I would quit smoking\".      Past Medical History:   Diagnosis Date    Arthritis     Asthma     Bronchitis     CAD (coronary artery disease)     Cerebral artery occlusion with cerebral infarction [facility])     Chronic obstructive pulmonary disease ([facility] [address] 2/3/2021    Emphysema of lung (HCC)     Follicular acne     as stated per pt. \"it comes and goes. Nothing helps it. I've been trying to tell doctors i need an antiobiotic.  Hx of blood clots     DVT'S (as stated per pt)    Hypertension     Hypoglycemia     Knee joint pain     Other and unspecified hyperlipidemia     PVD (peripheral vascular disease) ([facility] [address]     Sleep apnea     pt does not use cpap or bipap. Self diagnosed. Pt has not seen a physician about this.     Smoker     x 1 pack daily      Past Surgical History:   Procedure Laterality Date    APPENDECTOMY      CARPAL TUNNEL RELEASE Right     DIAGNOSTIC CARDIAC CATH LAB PROCEDURE      7/2020    HEMORRHOID SURGERY      OTHER SURGICAL HISTORY  11/2015    Femoral stent placement    PAIN MANAGEMENT PROCEDURE N/A 2/11/2020    EPIDURAL STEROID INJECTION L3-4 performed by Deepak Guzman at [address] [facility]      PTA/STENT LT FEMORAL ARTERY       Vitals 4/19/2021 2/3/2021 2/3/2021 12/16/2020 8/5/2020 2/7/7152   SYSTOLIC 504 847 084 523 153 843   DIASTOLIC 82 88 92 74 84 84   Pulse 86 - 100 93 - 89   Temp 97.1 - 97.6 - - -   Resp 18 - 20 - - -   SpO2 96 - 96 97 - 97   Weight 269 lb 3.2 oz - 267 lb 12.8 oz 270 lb - 277 lb   Height 5' 8\" - 5' 8\" 5' 8\" - 5' 8\"   Body mass index 40.93 kg/m2 - 40.71 kg/m2 41.05 kg/m2 - 42.11 kg/m2   Pain Level - - - - - -   Some recent data might be hidden       Family History   Problem Relation Age of Onset    Cancer Father     Heart Disease Father     Heart Surgery Father     Diabetes Father     Hypertension Mother     Hypertension Sister     Heart Disease Sister     Stroke Maternal Grandmother     Heart Disease Maternal Grandmother     Heart Disease Paternal Uncle     Heart Disease Maternal Grandfather     Stroke Maternal Grandfather        Social History     Tobacco Use    Smoking status: 5/25/1970)    Potassium monitoring  10/07/2021    Creatinine monitoring  10/07/2021    Diabetes screen  02/03/2024    Lipid screen  08/04/2025    Colon cancer screen colonoscopy  02/27/2031    Hepatitis A vaccine  Aged Out    Hepatitis B vaccine  Aged Out       Subjective:      Review of Systems   Constitutional: Negative for chills and fever. HENT: Negative for ear pain, hearing loss, rhinorrhea and voice change. Eyes: Negative for photophobia and visual disturbance. Respiratory: Negative for cough and shortness of breath. Cardiovascular: Negative for chest pain and palpitations. Gastrointestinal: Negative for nausea and vomiting. Endocrine: Negative. Negative for cold intolerance and heat intolerance. Genitourinary: Negative for difficulty urinating and flank pain. Musculoskeletal: Positive for arthralgias. Negative for back pain and neck pain. Skin: Negative for color change and rash. Allergic/Immunologic: Negative for environmental allergies and food allergies. Neurological: Negative for dizziness, speech difficulty and headaches. Hematological: Does not bruise/bleed easily. Psychiatric/Behavioral: Negative for sleep disturbance and suicidal ideas. Objective:     Physical Exam  Vitals signs and nursing note reviewed. Constitutional:       Appearance: He is well-developed. HENT:      Head: Atraumatic. Right Ear: External ear normal.      Left Ear: External ear normal.      Nose: Nose normal.   Eyes:      Conjunctiva/sclera: Conjunctivae normal.      Pupils: Pupils are equal, round, and reactive to light. Neck:      Musculoskeletal: Normal range of motion and neck supple. Cardiovascular:      Rate and Rhythm: Normal rate and regular rhythm. Heart sounds: Normal heart sounds, S1 normal and S2 normal.   Pulmonary:      Effort: Pulmonary effort is normal.      Breath sounds: Normal breath sounds.    Abdominal:      General: Bowel sounds are normal. to follow up with our office if you have not received results within 7 days after the testing took place. *If you receive a survey after visiting one of our offices, please take time to share your experience concerning your physician office visit. These surveys are confidential and no health information about you is shared. We are eager to improve for you and we are counting on your feedback to help make that happen. Personalized Preventive Plan for Rosanne Hooks - 4/19/2021  Medicare offers a range of preventive health benefits. Some of the tests and screenings are paid in full while other may be subject to a deductible, co-insurance, and/or copay. Some of these benefits include a comprehensive review of your medical history including lifestyle, illnesses that may run in your family, and various assessments and screenings as appropriate. After reviewing your medical record and screening and assessments performed today your provider may have ordered immunizations, labs, imaging, and/or referrals for you. A list of these orders (if applicable) as well as your Preventive Care list are included within your After Visit Summary for your review. Other Preventive Recommendations:    · A preventive eye exam performed by an eye specialist is recommended every 1-2 years to screen for glaucoma; cataracts, macular degeneration, and other eye disorders. · A preventive dental visit is recommended every 6 months. · Try to get at least 150 minutes of exercise per week or 10,000 steps per day on a pedometer . · Order or download the FREE \"Exercise & Physical Activity: Your Everyday Guide\" from [employer]. Call 5-983.547.5994 or search [employer] online. · You need 7719-0462 mg of calcium and 6583-0558 IU of vitamin D per day.  It is possible to meet your calcium requirement with diet alone, but a vitamin D supplement is usually necessary to meet this goal.  · When exposed to the sun, use a sunscreen that protects against both UVA and UVB radiation with an SPF of 30 or greater. Reapply every 2 to 3 hours or after sweating, drying off with a towel, or swimming. · Always wear a seat belt when traveling in a car. Always wear a helmet when riding a bicycle or motorcycle. Electronically signed by PARVEZ Oneill on 2021 at 3:48 PM    EMR Dragon/transcription disclaimer:  Much of thisencounter note is electronic transcription/translation of spoken language to printed texts. The electronic translation of spoken language may be erroneous, or at times, nonsensical words or phrases may be inadvertentlytranscribed. Although I have reviewed the note for such errors, some may still exist.  Medicare Annual Wellness Visit  Name: Blaise Vidal Date: 2021   MRN: [id number] Sex: Male   Age: 63 y.o. Ethnicity: Non-/Non    : 1965 Race: Simone Neves is here for Hand Pain (both hands seem to be bothering him; he states he tried the steroid pack and it did not help much; he has had tunnel release on his right hand in the past) and Hypertension    Screenings for behavioral, psychosocial and functional/safety risks, and cognitive dysfunction are all negative except as indicated below. These results, as well as other patient data from the [address] form, are documented in Flowsheets linked to this Encounter. Allergies   Allergen Reactions    Iodine      Iodine containing multi vitamin    Lisinopril      cough    Shellfish-Derived Products Swelling         Prior to Visit Medications    Medication Sig Taking?  Authorizing Provider   hydroxychloroquine (PLAQUENIL) 200 MG tablet  Yes Historical Provider, MD   albuterol sulfate  (90 Base) MCG/ACT inhaler INHALE 2 PUFFS BY MOUTH EVERY 6 HOURS AS NEEDED Yes PARVEZ Oneill   metoprolol succinate (TOPROL XL) 25 MG extended release tablet Take 1 tablet by mouth daily Yes Claudean Auer, APRN   losartan (COZAAR) 50 MG tablet TAKE ONE TABLET BY MOUTH 2 TIMES A DAY IN THE MORNING AND EVENING Yes Claudean Auer, APRN   albuterol (PROVENTIL) (2.5 MG/3ML) 0.083% nebulizer solution Take 3 mLs by nebulization 4 times daily Yes Claudean Auer, APRN   loteprednol (LOTEMAX) 0.5 % ophthalmic suspension Place 2 drops into both eyes daily Yes Historical Provider, MD   phenylephrine (RICK-SYNEPHRINE) 1 % nasal spray 1 drop by Nasal route every 4 hours as needed for Congestion Yes Historical Provider, MD   sulfaSALAzine (AZULFIDINE) 500 MG tablet   Historical Provider, MD         Past Medical History:   Diagnosis Date    Arthritis     Asthma     Bronchitis     CAD (coronary artery disease)     Cerebral artery occlusion with cerebral infarction ([facility] [address]     Chronic obstructive pulmonary disease ([facility] [address] 2/3/2021    Emphysema of lung ([facility] [address]     Follicular acne     as stated per pt. \"it comes and goes. Nothing helps it. I've been trying to tell doctors i need an antiobiotic.  Hx of blood clots     DVT'S (as stated per pt)    Hypertension     Hypoglycemia     Knee joint pain     Other and unspecified hyperlipidemia     PVD (peripheral vascular disease) ([address]     Sleep apnea     pt does not use cpap or bipap. Self diagnosed. Pt has not seen a physician about this.     Smoker     x 1 pack daily       Past Surgical History:   Procedure Laterality Date    APPENDECTOMY      CARPAL TUNNEL RELEASE Right     DIAGNOSTIC CARDIAC CATH LAB PROCEDURE      7/2020    HEMORRHOID SURGERY      OTHER SURGICAL HISTORY  11/2015    Femoral stent placement    PAIN MANAGEMENT PROCEDURE N/A 2/11/2020    EPIDURAL STEROID INJECTION L3-4 performed by Deepak Guzman at [address] [facility]      PTA/STENT LT FEMORAL ARTERY         Family History   Problem Relation Age of Onset    Cancer Father     Heart Disease Father     Heart Surgery Father     carotid bruits  Abdomen: soft, non-tender, non-distended, normal bowel sounds, no masses or organomegaly  Extremities: no cyanosis, clubbing or edema  Musculoskeletal: normal range of motion, no joint swelling, deformity or tenderness  Neurologic: reflexes normal and symmetric, no cranial nerve deficit, gait, coordination and speech normal    Patient's complete Health Risk Assessment and screening values have been reviewed and are found in Flowsheets. The following problems were reviewed today and where indicated follow up appointments were made and/or referrals ordered. Positive Risk Factor Screenings with Interventions:         Substance History:  Social History     Tobacco History     Smoking Status  Current Every Day Smoker Smoking Start Date  11/26/1981 Smoking Frequency  1 pack/day for 38 years (45 pk yrs)    Smokeless Tobacco Use  Never Used          Alcohol History     Alcohol Use Status  No          Drug Use     Drug Use Status  Not Currently          Sexual Activity     Sexually Active  Not Asked               Alcohol Screening:       A score of 8 or more is associated with harmful or hazardous drinking. A score of 13 or more in women, and 15 or more in men, is likely to indicate alcohol dependence.   Substance Abuse Interventions:  · Tobacco abuse:  patient is not ready to work toward tobacco cessation at this time    [address] [facility] and ACP:  General  In general, how would you say your health is?: [address] you get the social and emotional support that you need?: Yes  Do you have a Living Will?: (!) No  Advance Directives     Power of [facility] Will ACP-Advance Directive ACP-Power of     Not on File Filed on 07/08/20 Filed Not on File      General Health Risk Interventions:  · No Living Will: Patient declines ACP discussion/assistance    Health Habits/Nutrition:  Health Habits/Nutrition  Do you exercise for at least 20 minutes 2-3 times per week?: (!) No  Have you lost any weight without trying in the past 3 months?: No  Do you eat only one meal per day?: No  Have you seen the dentist within the past year?: (!) No  Body mass index: (!) 40.93  Health Habits/Nutrition Interventions:  · Inadequate physical activity:  patient is not ready to increase his/her physical activity level at this time       Personalized Preventive Plan   Current Health Maintenance Status    There is no immunization history on file for this patient. Health Maintenance   Topic Date Due    Hepatitis C screen  Never done    Meningococcal (ACWY) vaccine (1 - Risk start before 7 months 4-dose series) Never done    Hib vaccine (1 of 1 - Risk 1-dose series) Never done    Meningococcal B vaccine (1 of 4 - Increased Risk Bexsero 2-dose series) Never done    HIV screen  Never done    COVID-19 Vaccine (1) Never done    DTaP/Tdap/Td vaccine (1 - Tdap) Never done    Low dose CT lung screening  Never done    Annual Wellness Visit (AWV)  Never done    Flu vaccine (Season Ended) 02/03/2022 (Originally 9/1/2021)    Shingles Vaccine (1 of 2) 02/03/2022 (Originally 5/25/2014)    Pneumococcal 0-64 years Vaccine (1 of 3 - PCV13) 02/03/2022 (Originally 5/25/1970)    Potassium monitoring  10/07/2021    Creatinine monitoring  10/07/2021    Diabetes screen  02/03/2024    Lipid screen  08/04/2025    Colon cancer screen colonoscopy  02/27/2031    Hepatitis A vaccine  Aged Out    Hepatitis B vaccine  Aged Out     Recommendations for [email or web address] Due: see orders and patient instructions/AVS.  . Recommended screening schedule for the next 5-10 years is provided to the patient in written form: see Patient Instructions/AVS.    Teresa Santillan was seen today for hand pain and hypertension.     Diagnoses and all orders for this visit:    Routine general medical examination at a health care facility    Arthritis    Neuropathy

## 2021-04-19 NOTE — PATIENT INSTRUCTIONS
We are committed to providing you with the best care possible. In order to help us achieve these goals please remember to bring all medications, herbal products, and over the counter supplements with you to each visit. If your provider has ordered testing for you, please be sure to follow up with our office if you have not received results within 7 days after the testing took place. *If you receive a survey after visiting one of our offices, please take time to share your experience concerning your physician office visit. These surveys are confidential and no health information about you is shared. We are eager to improve for you and we are counting on your feedback to help make that happen. Personalized Preventive Plan for Oumou Warner - 4/19/2021  Medicare offers a range of preventive health benefits. Some of the tests and screenings are paid in full while other may be subject to a deductible, co-insurance, and/or copay. Some of these benefits include a comprehensive review of your medical history including lifestyle, illnesses that may run in your family, and various assessments and screenings as appropriate. After reviewing your medical record and screening and assessments performed today your provider may have ordered immunizations, labs, imaging, and/or referrals for you. A list of these orders (if applicable) as well as your Preventive Care list are included within your After Visit Summary for your review. Other Preventive Recommendations:    · A preventive eye exam performed by an eye specialist is recommended every 1-2 years to screen for glaucoma; cataracts, macular degeneration, and other eye disorders. · A preventive dental visit is recommended every 6 months. · Try to get at least 150 minutes of exercise per week or 10,000 steps per day on a pedometer . · Order or download the FREE \"Exercise & Physical Activity: Your Everyday Guide\" from [employer].  Call 1-187.548.3901 or search The [employer] on [address] [place] [address]. · You need 4688-3800 mg of calcium and 9124-4895 IU of vitamin D per day. It is possible to meet your calcium requirement with diet alone, but a vitamin D supplement is usually necessary to meet this goal.  · When exposed to the sun, use a sunscreen that protects against both UVA and UVB radiation with an SPF of 30 or greater. Reapply every 2 to 3 hours or after sweating, drying off with a towel, or swimming. · Always wear a seat belt when traveling in a car. Always wear a helmet when riding a bicycle or motorcycle.

## 2021-05-05 ENCOUNTER — TELEPHONE (OUTPATIENT)
Dept: CARDIOLOGY CLINIC | Age: 56
End: 2021-05-05

## 2021-05-05 NOTE — TELEPHONE ENCOUNTER
Rickie Hackett, APRN - CNP  You 2 hours ago (11:16 AM)     Patient will need cardiac stress test prior to being approved. He is noncompliant with his medications.

## 2021-05-05 NOTE — TELEPHONE ENCOUNTER
Date: ASAP    Cardiologist: Dr. Toshia May    Procedure: Keary Lady 3/4 with bilateral L4 foraminotomy    Surgeon: Dr. George Caanles Office Visit: 12-16-20    Reason for office visit and medical concerns addressed at this office visit: CAD, HTN, Hyperlipidemia, PVD, Emphysema, COPD    Testing Performed and Date of Service:  Heart Cath 7-8-20 with IRLANDA    RCRI = 1 pt, low, 0.4%   METs 3    Current Medications: Plaquenil, sulfasalazine, albuterol, metoprolol, losartan, lotemax, phenylephrine    Is the patient currently taking an anticoagulant? If so, what is the diagnosis the patient has been given to warrant the need for the anticoagulant?  no    Additional Notes: Cardiac Clearance request

## 2021-05-06 NOTE — TELEPHONE ENCOUNTER
Spoke with pt, he was very upset and states he is never coming back to us-\"we are crazy, there isn't anything wrong with his heart and he isn't having any test done. \" I explained that we can't clear him for surgery and he states he doesn't care cancel his back surgery. I sent this to Dr. Brielle Griffith.

## 2021-06-10 ENCOUNTER — TELEPHONE (OUTPATIENT)
Dept: ADMINISTRATIVE | Age: 56
End: 2021-06-10

## 2021-06-10 DIAGNOSIS — I73.9 PVD (PERIPHERAL VASCULAR DISEASE) (HCC): Primary | ICD-10-CM

## 2021-06-10 DIAGNOSIS — I66.9 CEREBRAL ARTERY OCCLUSION: ICD-10-CM

## 2021-06-15 NOTE — TELEPHONE ENCOUNTER
I recommend referral to Vascular due to PVD and hx of DVTs, and cerebral artery occlusion. This was recommended by Neurosurgery at [facility].

## 2021-06-17 ENCOUNTER — TELEPHONE (OUTPATIENT)
Dept: VASCULAR SURGERY | Age: 56
End: 2021-06-17

## 2021-06-17 NOTE — TELEPHONE ENCOUNTER
Unable to reach patient by phone to notify of appointment scheduled for 7/1/21 at 9:30.   Sent [address] message to notify of appointment included directions and contact information for office Diabetes

## 2021-06-22 RX ORDER — LOSARTAN POTASSIUM 50 MG/1
TABLET ORAL
Qty: 180 TABLET | Refills: 0 | Status: SHIPPED | OUTPATIENT
Start: 2021-06-22 | End: 2021-09-16

## 2021-07-28 ENCOUNTER — OFFICE VISIT (OUTPATIENT)
Dept: VASCULAR SURGERY | Age: 56
End: 2021-07-28
Payer: MEDICARE

## 2021-07-28 VITALS
BODY MASS INDEX: 40.92 KG/M2 | HEART RATE: 74 BPM | OXYGEN SATURATION: 96 % | HEIGHT: 68 IN | WEIGHT: 270 LBS | SYSTOLIC BLOOD PRESSURE: 130 MMHG | DIASTOLIC BLOOD PRESSURE: 80 MMHG | RESPIRATION RATE: 18 BRPM | TEMPERATURE: 98 F

## 2021-07-28 DIAGNOSIS — I73.9 PVD (PERIPHERAL VASCULAR DISEASE) (HCC): Primary | ICD-10-CM

## 2021-07-28 PROCEDURE — G8417 CALC BMI ABV UP PARAM F/U: HCPCS | Performed by: PHYSICIAN ASSISTANT

## 2021-07-28 PROCEDURE — 3017F COLORECTAL CA SCREEN DOC REV: CPT | Performed by: PHYSICIAN ASSISTANT

## 2021-07-28 PROCEDURE — G8427 DOCREV CUR MEDS BY ELIG CLIN: HCPCS | Performed by: PHYSICIAN ASSISTANT

## 2021-07-28 PROCEDURE — 99214 OFFICE O/P EST MOD 30 MIN: CPT | Performed by: PHYSICIAN ASSISTANT

## 2021-07-28 PROCEDURE — 4004F PT TOBACCO SCREEN RCVD TLK: CPT | Performed by: PHYSICIAN ASSISTANT

## 2021-07-28 RX ORDER — PREDNISONE 2.5 MG
2.5 TABLET ORAL DAILY
COMMUNITY

## 2021-07-28 NOTE — PROGRESS NOTES
Patient Care Team:  PARVEZ Land as PCP - General (Family Nurse Practitioner)  PARVEZ Land as PCP - [facility]  PARVEZ Godoy - NP as Nurse Practitioner (Nurse Practitioner Adult Health)  Lion Broussard MD as Consulting Physician (Cardiology)  Floyd Alfaro DO as Consulting Physician (Vascular Surgery)      History and Physical  L is a 74-year-old male who has a history of hypertension, chronic back pain for greater than 30 years status post lumbar laminectomy, COPD, CAD, s/p stenting, peripheral vascular disease, s/p left leg stent placed by Dr. oJseph Cohn tobacco abuse. He presents today as a referral from PARVEZ Flores for evaluation of PVD. He had an MRI of the lumbar spine on 10/8/2020 that revealed changes from decompressive laminectomies with posterior fusion at L4-S1. Multilevel degenerative changes of lumbar spine, most prominent at L3-4 where there is a moderate to severe spinal canal stenosis. He was told that he is not a candidate for back surgery. He reports numbness in his buttocks and posterior thighs as well as cramping in his calf at variable distance which ranges from 20 to 80 feet. He denies any ischemic rest pain nor nonhealing wounds. Currently he is not on an antiplatelet or statin. He is still smoking but in the process of quitting. Old records have been obtained, reviewed, and summarized.     Ania Eubanks is a 61 y.o. male with the following history reviewed and recorded in [facility]:  Patient Active Problem List    Diagnosis Date Noted    Chronic obstructive pulmonary disease ([facility] [address] 02/03/2021    Class 3 severe obesity with body mass index (BMI) of 40.0 to 44.9 in adult [facility]) 02/03/2021    Sinus pause 08/05/2020    Tobacco abuse disorder 05/31/2020    Xanthelasma of eyelid, bilateral 01/10/2020    Coronary artery disease of native artery of native heart with stable angina pectoris ([facility] [address] 11/27/2019    History of sleep apnea 11/27/2019    History of splenectomy 11/27/2019    Leukocytosis 11/07/2019    Chronic laryngitis 06/24/2019    Globus sensation 06/24/2019    Essential hypertension 04/14/2016    Other hyperlipidemia      Current Outpatient Medications   Medication Sig Dispense Refill    metFORMIN (GLUCOPHAGE) 500 MG tablet Take 500 mg by mouth 2 times daily (with meals)      predniSONE (DELTASONE) 2.5 MG tablet Take 2.5 mg by mouth daily      losartan (COZAAR) 50 MG tablet TAKE ONE TABLET BY MOUTH 2 TIMES A DAY IN THE MORNING AND EVENING 180 tablet 0    hydroxychloroquine (PLAQUENIL) 200 MG tablet       albuterol sulfate  (90 Base) MCG/ACT inhaler INHALE 2 PUFFS BY MOUTH EVERY 6 HOURS AS NEEDED 8.5 g 5    metoprolol succinate (TOPROL XL) 25 MG extended release tablet Take 1 tablet by mouth daily 30 tablet 5    albuterol (PROVENTIL) (2.5 MG/3ML) 0.083% nebulizer solution Take 3 mLs by nebulization 4 times daily 120 each 3    loteprednol (LOTEMAX) 0.5 % ophthalmic suspension Place 2 drops into both eyes daily      phenylephrine (RICK-SYNEPHRINE) 1 % nasal spray 1 drop by Nasal route every 4 hours as needed for Congestion      sulfaSALAzine (AZULFIDINE) 500 MG tablet  (Patient not taking: Reported on 7/28/2021)       No current facility-administered medications for this visit. Allergies: Iodine, Lisinopril, and Shellfish-derived products  Past Medical History:   Diagnosis Date    Arthritis     Asthma     Bronchitis     CAD (coronary artery disease)     Cerebral artery occlusion with cerebral infarction (HCC)     Chronic obstructive pulmonary disease ([facility] [address] 2/3/2021    Emphysema of lung (HCC)     Follicular acne     as stated per pt. \"it comes and goes. Nothing helps it. I've been trying to tell doctors i need an antiobiotic.     Hx of blood clots     DVT'S (as stated per pt)    Hypertension     Hypoglycemia     Knee joint pain     Other and unspecified hyperlipidemia     PVD (peripheral vascular disease) ([facility] [address]     Sleep apnea     pt does not use cpap or bipap. Self diagnosed. Pt has not seen a physician about this.  Smoker     x 1 pack daily     Past Surgical History:   Procedure Laterality Date    APPENDECTOMY      CARPAL TUNNEL RELEASE Right     DIAGNOSTIC CARDIAC CATH LAB PROCEDURE      7/2020    HEMORRHOID SURGERY      OTHER SURGICAL HISTORY  11/2015    Femoral stent placement    PAIN MANAGEMENT PROCEDURE N/A 2/11/2020    EPIDURAL STEROID INJECTION L3-4 [facility]      VASCULAR SURGERY      PTA/STENT LT FEMORAL ARTERY     Family History   Problem Relation Age of Onset    Cancer Father     Heart Disease Father     Heart Surgery Father     Diabetes Father     Hypertension Mother     Hypertension Sister     Heart Disease Sister     Stroke Maternal Grandmother     Heart Disease Maternal Grandmother     Heart Disease Paternal Uncle     Heart Disease Maternal Grandfather     Stroke Maternal Grandfather      Social History     Tobacco Use    Smoking status: Current Every Day Smoker     Packs/day: 1.00     Years: 38.00     Pack years: 38.00     Start date: 11/26/1981    Smokeless tobacco: Never Used   Substance Use Topics    Alcohol use: No       Review of Systems    Constitutional  no significant activity change, appetite change, or unexpected weight change. No fever or chills. No diaphoresis or significant fatigue. HENT  no significant rhinorrhea or epistaxis. No tinnitus or significant hearing loss. Eyes  no sudden vision change or amaurosis. Respiratory  no significant shortness of breath, wheezing, or stridor. No apnea, cough, or chest tightness associated with shortness of breath. Cardiovascular  no chest pain, syncope, or significant dizziness. No palpitations or significant leg swelling. (see HPI)  Gastrointestinal  no abdominal swelling or pain. No blood in stool.   No severe constipation, diarrhea, nausea, or vomiting. Genitourinary  No difficulty urinating, dysuria, frequency, or urgency. No flank pain or hematuria. Musculoskeletal  no back pain, gait disturbance, or myalgia. Skin  no color change, rash, pallor, or new wound. Neurologic  no dizziness, facial asymmetry, or light headedness. No seizures. No speech difficulty or lateralizing weakness. Hematologic  no easy bruising or excessive bleeding. Psychiatric  no severe anxiety or nervousness. No confusion. All other review of systems are negative. Physical Exam    /80 (Site: Left Upper Arm)   Pulse 74   Temp 98 °F (36.7 °C) (Temporal)   Resp 18   Ht 5' 8\" (1.727 m)   Wt 270 lb (122.5 kg)   SpO2 96%   BMI 41.05 kg/m²     Constitutional  well developed, well nourished. No diaphoresis or acute distress. HENT  head normocephalic. Right external ear canal appears normal.  Left external ear canal appears normal.  Septum appears midline. Eyes  conjunctiva normal.  EOMS normal.  No exudate. No icterus. Neck- ROM appears normal, no tracheal deviation. Cardiovascular  Regular rate and rhythm. Heart sounds are normal.  No murmur, rub, or gallop. Carotid pulses are 2+ to palpation bilaterally without bruit. Extremities - Radial and ulnar pulses are 2+ to palpation bilaterally. DP and PT pulses are NP. Feet are warm and without wounds no cyanosis, clubbing, or significant edema. No signs atheroembolic event. Pulmonary  effort appears normal.  No respiratory distress. Lungs - Breath sounds normal. No wheezes or rales. GI - Abdomen  soft, non tender, bowel sounds X 4 quadrants. No guarding or rebound tenderness. No distension or palpable mass. Genitourinary  deferred. Musculoskeletal  ROM appears normal.  No significant edema. Neurologic  alert and oriented X 3. Physiologic. Skin  warm, dry, and intact. No rash, erythema, or pallor.   Psychiatric  mood, affect, and behavior appear normal.  Judgment and thought processes appear normal.    Risk factors for atherosclerosis of all vascular beds have been reviewed with the patient including:  Family history, tobacco abuse in all forms, elevated cholesterol, hyperlipidemia, and diabetes. Assessment      1. PVD (peripheral vascular disease) ([facility] [address]          Plan        Recommend aspirin 81 mg daily  He has tried statins in the past and failed due to side effects  We discussed smoking cessation (he is in the process of attempting to stop)  We will obtain a left a scan with ABIs. After we have reviewed the imaging we will call him with the results and further recommendations      Please note that parts of the chart were generated using Dragon dictation software. Although every effort was made to ensure the accuracy of this automated transcription, some errors in transcription may have occurred.

## 2021-08-18 DIAGNOSIS — I10 ESSENTIAL HYPERTENSION: ICD-10-CM

## 2021-08-19 RX ORDER — METOPROLOL SUCCINATE 25 MG/1
TABLET, EXTENDED RELEASE ORAL
Qty: 90 TABLET | Refills: 3 | Status: SHIPPED | OUTPATIENT
Start: 2021-08-19 | End: 2022-08-11

## 2021-08-19 NOTE — TELEPHONE ENCOUNTER
Lary Washington called requesting a refill of the below medication which has been pended for you:     Requested Prescriptions     Pending Prescriptions Disp Refills    metoprolol succinate (TOPROL XL) 25 MG extended release tablet [Pharmacy Med Name: METOPROLOL SUCC ER 25 MG TAB] 90 tablet 3     Sig: TAKE ONE TABLET BY MOUTH EVERY DAY       Last Appointment Date: 4/19/2021  Next Appointment Date: 8/23/2021    Allergies   Allergen Reactions    Iodine      Iodine containing multi vitamin    Lisinopril      cough    Shellfish-Derived Products Swelling

## 2021-08-23 ENCOUNTER — OFFICE VISIT (OUTPATIENT)
Dept: PRIMARY CARE CLINIC | Age: 56
End: 2021-08-23
Payer: MEDICARE

## 2021-08-23 VITALS
WEIGHT: 273 LBS | SYSTOLIC BLOOD PRESSURE: 136 MMHG | HEART RATE: 88 BPM | HEIGHT: 68 IN | OXYGEN SATURATION: 95 % | TEMPERATURE: 96.9 F | DIASTOLIC BLOOD PRESSURE: 82 MMHG | RESPIRATION RATE: 18 BRPM | BODY MASS INDEX: 41.37 KG/M2

## 2021-08-23 DIAGNOSIS — E66.01 CLASS 3 SEVERE OBESITY WITH BODY MASS INDEX (BMI) OF 40.0 TO 44.9 IN ADULT, UNSPECIFIED OBESITY TYPE, UNSPECIFIED WHETHER SERIOUS COMORBIDITY PRESENT (HCC): ICD-10-CM

## 2021-08-23 DIAGNOSIS — Z72.0 TOBACCO ABUSE DISORDER: ICD-10-CM

## 2021-08-23 DIAGNOSIS — L73.2 HIDRADENITIS: ICD-10-CM

## 2021-08-23 DIAGNOSIS — I10 ESSENTIAL HYPERTENSION: Primary | ICD-10-CM

## 2021-08-23 DIAGNOSIS — I25.118 CORONARY ARTERY DISEASE OF NATIVE ARTERY OF NATIVE HEART WITH STABLE ANGINA PECTORIS (HCC): ICD-10-CM

## 2021-08-23 PROCEDURE — G8417 CALC BMI ABV UP PARAM F/U: HCPCS | Performed by: NURSE PRACTITIONER

## 2021-08-23 PROCEDURE — 99214 OFFICE O/P EST MOD 30 MIN: CPT | Performed by: NURSE PRACTITIONER

## 2021-08-23 PROCEDURE — 4004F PT TOBACCO SCREEN RCVD TLK: CPT | Performed by: NURSE PRACTITIONER

## 2021-08-23 PROCEDURE — G8427 DOCREV CUR MEDS BY ELIG CLIN: HCPCS | Performed by: NURSE PRACTITIONER

## 2021-08-23 PROCEDURE — 99406 BEHAV CHNG SMOKING 3-10 MIN: CPT | Performed by: NURSE PRACTITIONER

## 2021-08-23 PROCEDURE — 3017F COLORECTAL CA SCREEN DOC REV: CPT | Performed by: NURSE PRACTITIONER

## 2021-08-23 RX ORDER — LEVETIRACETAM 250 MG/1
250 TABLET ORAL 2 TIMES DAILY
COMMUNITY
Start: 2021-07-29 | End: 2022-02-28

## 2021-08-23 RX ORDER — GABAPENTIN 300 MG/1
300 CAPSULE ORAL 4 TIMES DAILY
COMMUNITY
Start: 2021-08-20

## 2021-08-23 ASSESSMENT — ENCOUNTER SYMPTOMS
VOICE CHANGE: 0
PHOTOPHOBIA: 0
COLOR CHANGE: 0
COUGH: 0
NAUSEA: 0
RHINORRHEA: 0
VOMITING: 0
SHORTNESS OF BREATH: 0
BACK PAIN: 0

## 2021-08-23 ASSESSMENT — PATIENT HEALTH QUESTIONNAIRE - PHQ9
1. LITTLE INTEREST OR PLEASURE IN DOING THINGS: 0
2. FEELING DOWN, DEPRESSED OR HOPELESS: 0
SUM OF ALL RESPONSES TO PHQ QUESTIONS 1-9: 0
SUM OF ALL RESPONSES TO PHQ9 QUESTIONS 1 & 2: 0

## 2021-08-24 ENCOUNTER — HOSPITAL ENCOUNTER (OUTPATIENT)
Dept: PAIN MANAGEMENT | Age: 56
Discharge: HOME | End: 2021-08-24
Payer: MEDICARE

## 2021-08-24 VITALS
HEART RATE: 87 BPM | DIASTOLIC BLOOD PRESSURE: 87 MMHG | OXYGEN SATURATION: 98 % | TEMPERATURE: 96.9 F | SYSTOLIC BLOOD PRESSURE: 151 MMHG | RESPIRATION RATE: 18 BRPM

## 2021-08-24 DIAGNOSIS — R52 PAIN MANAGEMENT: ICD-10-CM

## 2021-08-24 PROCEDURE — 2500000003 HC RX 250 WO HCPCS

## 2021-08-24 PROCEDURE — G0260 INJ FOR SACROILIAC JT ANESTH: HCPCS

## 2021-08-24 PROCEDURE — 2580000003 HC RX 258

## 2021-08-24 PROCEDURE — 6360000002 HC RX W HCPCS

## 2021-08-24 PROCEDURE — 3209999900 FLUORO FOR SURGICAL PROCEDURES

## 2021-08-24 RX ORDER — METHYLPREDNISOLONE ACETATE 80 MG/ML
80 INJECTION, SUSPENSION INTRA-ARTICULAR; INTRALESIONAL; INTRAMUSCULAR; SOFT TISSUE ONCE
Status: DISCONTINUED | OUTPATIENT
Start: 2021-08-24 | End: 2021-08-26 | Stop reason: HOSPADM

## 2021-08-24 RX ORDER — SODIUM CHLORIDE 9 MG/ML
3 INJECTION INTRAVENOUS ONCE
Status: DISCONTINUED | OUTPATIENT
Start: 2021-08-24 | End: 2021-08-26 | Stop reason: HOSPADM

## 2021-08-24 RX ORDER — BUPIVACAINE HYDROCHLORIDE 5 MG/ML
2 INJECTION, SOLUTION EPIDURAL; INTRACAUDAL ONCE
Status: DISCONTINUED | OUTPATIENT
Start: 2021-08-24 | End: 2021-08-26 | Stop reason: HOSPADM

## 2021-08-24 RX ORDER — LIDOCAINE HYDROCHLORIDE 10 MG/ML
7 INJECTION, SOLUTION EPIDURAL; INFILTRATION; INTRACAUDAL; PERINEURAL ONCE
Status: DISCONTINUED | OUTPATIENT
Start: 2021-08-24 | End: 2021-08-26 | Stop reason: HOSPADM

## 2021-08-24 ASSESSMENT — PAIN SCALES - GENERAL: PAINLEVEL_OUTOF10: 9

## 2021-08-24 ASSESSMENT — PAIN DESCRIPTION - LOCATION: LOCATION: BACK

## 2021-08-24 ASSESSMENT — PAIN - FUNCTIONAL ASSESSMENT: PAIN_FUNCTIONAL_ASSESSMENT: 0-10

## 2021-08-24 ASSESSMENT — PAIN DESCRIPTION - ORIENTATION: ORIENTATION: LOWER

## 2021-08-24 ASSESSMENT — PAIN DESCRIPTION - PAIN TYPE: TYPE: CHRONIC PAIN

## 2021-08-24 NOTE — INTERVAL H&P NOTE
Update History & Physical    The patient's History and Physical   was reviewed with the patient and I examined the patient. There was  NO CHANGE:[id number]}. The surgical site was confirmed by the patient and me. Plan: The risks, benefits, expected outcome, and alternative to the recommended procedure have been discussed with the patient. Patient understands and wants to proceed with the procedure.      Electronically signed by Jennifer Wright MD on 8/24/2021 at 9:11 AM

## 2021-09-07 RX ORDER — ALBUTEROL SULFATE 2.5 MG/3ML
SOLUTION RESPIRATORY (INHALATION)
Qty: 180 ML | Refills: 0 | Status: SHIPPED | OUTPATIENT
Start: 2021-09-07 | End: 2021-10-07

## 2021-09-16 RX ORDER — LOSARTAN POTASSIUM 50 MG/1
TABLET ORAL
Qty: 180 TABLET | Refills: 3 | Status: SHIPPED | OUTPATIENT
Start: 2021-09-16 | End: 2022-09-15

## 2021-09-16 NOTE — TELEPHONE ENCOUNTER
Donaldo Alvarez called to request a refill on his medication.       Last office visit : 8/23/2021   Next office visit : 2/28/2022     Requested Prescriptions     Pending Prescriptions Disp Refills    losartan (COZAAR) 50 MG tablet [Pharmacy Med Name: LOSARTAN POTASSIUM 50 MG TAB] 180 tablet 3     Sig: TAKE ONE TABLET BY MOUTH 2 TIMES A DAY IN THE MORNING AND EVENING            Caro Mackenzie

## 2021-10-07 RX ORDER — ALBUTEROL SULFATE 2.5 MG/3ML
SOLUTION RESPIRATORY (INHALATION)
Qty: 180 ML | Refills: 0 | Status: SHIPPED | OUTPATIENT
Start: 2021-10-07 | End: 2022-10-08

## 2021-10-07 RX ORDER — ALBUTEROL SULFATE 90 UG/1
AEROSOL, METERED RESPIRATORY (INHALATION)
Qty: 8.5 G | Refills: 0 | Status: SHIPPED | OUTPATIENT
Start: 2021-10-07 | End: 2021-12-06

## 2021-10-27 ENCOUNTER — TELEPHONE (OUTPATIENT)
Dept: PRIMARY CARE CLINIC | Age: 56
End: 2021-10-27

## 2021-10-27 NOTE — TELEPHONE ENCOUNTER
[facility] called said he has lumps under his armpit again. He wants to know if you will give him some medication or does he need to be seen.

## 2021-10-29 ENCOUNTER — TELEPHONE (OUTPATIENT)
Dept: PRIMARY CARE CLINIC | Age: 56
End: 2021-10-29

## 2021-10-29 NOTE — TELEPHONE ENCOUNTER
----- Message from Ronnell Phalen sent at 10/22/2021  4:26 PM CDT -----  Subject: Message to Provider    QUESTIONS  Information for Provider? Pt called in wanting an antibiotic for his what   he believes is Hidradenitis. He would like his PCP to call him on monday   morning 10/25  ---------------------------------------------------------------------------  --------------  CALL BACK INFO  What is the best way for the office to contact you? Do not leave any   message, patient will call back for answer  Preferred Call Back Phone Number? [phone]  ---------------------------------------------------------------------------  --------------  SCRIPT ANSWERS  Relationship to Patient?  Self

## 2021-11-01 ENCOUNTER — VIRTUAL VISIT (OUTPATIENT)
Dept: PRIMARY CARE CLINIC | Age: 56
End: 2021-11-01
Payer: MEDICARE

## 2021-11-01 DIAGNOSIS — L73.2 LEFT AXILLARY HIDRADENITIS: Primary | ICD-10-CM

## 2021-11-01 PROCEDURE — 3017F COLORECTAL CA SCREEN DOC REV: CPT | Performed by: NURSE PRACTITIONER

## 2021-11-01 PROCEDURE — G8417 CALC BMI ABV UP PARAM F/U: HCPCS | Performed by: NURSE PRACTITIONER

## 2021-11-01 PROCEDURE — G8484 FLU IMMUNIZE NO ADMIN: HCPCS | Performed by: NURSE PRACTITIONER

## 2021-11-01 PROCEDURE — 4004F PT TOBACCO SCREEN RCVD TLK: CPT | Performed by: NURSE PRACTITIONER

## 2021-11-01 PROCEDURE — G8427 DOCREV CUR MEDS BY ELIG CLIN: HCPCS | Performed by: NURSE PRACTITIONER

## 2021-11-01 PROCEDURE — 99213 OFFICE O/P EST LOW 20 MIN: CPT | Performed by: NURSE PRACTITIONER

## 2021-11-01 RX ORDER — CLINDAMYCIN HYDROCHLORIDE 300 MG/1
300 CAPSULE ORAL 4 TIMES DAILY
Qty: 40 CAPSULE | Refills: 0 | Status: SHIPPED | OUTPATIENT
Start: 2021-11-01 | End: 2022-02-28

## 2021-11-01 ASSESSMENT — PATIENT HEALTH QUESTIONNAIRE - PHQ9
1. LITTLE INTEREST OR PLEASURE IN DOING THINGS: 0
SUM OF ALL RESPONSES TO PHQ QUESTIONS 1-9: 0
SUM OF ALL RESPONSES TO PHQ9 QUESTIONS 1 & 2: 0
2. FEELING DOWN, DEPRESSED OR HOPELESS: 0

## 2021-11-01 ASSESSMENT — ENCOUNTER SYMPTOMS
PHOTOPHOBIA: 0
VOICE CHANGE: 0
NAUSEA: 0
RHINORRHEA: 0
COLOR CHANGE: 0
VOMITING: 0
BACK PAIN: 0
SHORTNESS OF BREATH: 0
COUGH: 0

## 2021-11-01 NOTE — PROGRESS NOTES
[facility]   [address] [place] [address]             Phone:  [phone]  Fax:  [phone]       2021    TELEHEALTH EVALUATION -- Audio/Visual (During LFPYF-49 public health emergency)    HPI:  Chief Complaint   Patient presents with    Other     left armpit has a lump under it      Sandrita Antonio (:  1965) has requested an audio/video evaluation for the following concern(s):    Patient presents today for evaluation of lump under left axilla. He reports this is a recurrent issue over the last 1-2 years. He is fairly certain he has hidradenitis. He has occasional drainage from the lumps. He denies fever or chills. He has used some OTC medication without relief. Review of Systems   Constitutional: Negative for chills and fever. HENT: Negative for ear pain, hearing loss, rhinorrhea and voice change. Eyes: Negative for photophobia and visual disturbance. Respiratory: Negative for cough and shortness of breath. Cardiovascular: Negative for chest pain and palpitations. Gastrointestinal: Negative for nausea and vomiting. Endocrine: Negative. Negative for cold intolerance and heat intolerance. Genitourinary: Negative for difficulty urinating and flank pain. Musculoskeletal: Negative for back pain and neck pain. Skin: Negative for color change and rash. Allergic/Immunologic: Negative for environmental allergies and food allergies. Neurological: Negative for dizziness, speech difficulty and headaches. Hematological: Does not bruise/bleed easily. Psychiatric/Behavioral: Negative for sleep disturbance and suicidal ideas. Prior to Visit Medications    Medication Sig Taking?  Authorizing Provider   clindamycin (CLEOCIN) 300 MG capsule Take 1 capsule by mouth 4 times daily Yes PARVEZ Warner   albuterol (PROVENTIL) (2.5 MG/3ML) 0.083% nebulizer solution INHALE CONTENTS OF 1 VIAL VIA NEBULIZER FOUR TIMES A DAY AS NEEDED Yes PARVEZ Warner albuterol sulfate  (90 Base) MCG/ACT inhaler INHALE 2 PUFFS BY MOUTH EVERY 6 HOURS AS NEEDED Yes PARVEZ Dobbins   losartan (COZAAR) 50 MG tablet TAKE ONE TABLET BY MOUTH 2 TIMES A DAY IN THE MORNING AND EVENING Yes PARVEZ Dobbins   gabapentin (NEURONTIN) 300 MG capsule Take 300 mg by mouth 2 times daily.  TAKE 1 TABLET BY MOUTH 2 TIMES DAILY Yes Historical Provider, MD   levETIRAcetam (KEPPRA) 250 MG tablet Take 250 mg by mouth 2 times daily Yes Historical Provider, MD   metoprolol succinate (TOPROL XL) 25 MG extended release tablet TAKE ONE TABLET BY MOUTH EVERY DAY Yes PARVEZ Dobbins   metFORMIN (GLUCOPHAGE) 500 MG tablet Take 500 mg by mouth 2 times daily (with meals) Yes Historical Provider, MD   predniSONE (DELTASONE) 2.5 MG tablet Take 2.5 mg by mouth daily Yes Historical Provider, MD   hydroxychloroquine (PLAQUENIL) 200 MG tablet  Yes Historical Provider, MD   loteprednol (LOTEMAX) 0.5 % ophthalmic suspension Place 2 drops into both eyes daily Yes Historical Provider, MD   phenylephrine (RICK-SYNEPHRINE) 1 % nasal spray 1 drop by Nasal route every 4 hours as needed for Congestion Yes Historical Provider, MD   sulfaSALAzine (AZULFIDINE) 500 MG tablet   Historical Provider, MD       Social History     Tobacco Use    Smoking status: Current Every Day Smoker     Packs/day: 1.00     Years: 38.00     Pack years: 38.00     Start date: 11/26/1981    Smokeless tobacco: Never Used   Vaping Use    Vaping Use: Never used   Substance Use Topics    Alcohol use: No    Drug use: Not Currently        Allergies   Allergen Reactions    Iodine      Iodine containing multi vitamin    Lisinopril      cough    Shellfish-Derived Products Swelling   ,   Past Medical History:   Diagnosis Date    Arthritis     Asthma     Bronchitis     CAD (coronary artery disease)     Cerebral [facility] occlusion [facility] infarction ([facility] [address]     Chronic obstructive pulmonary disease ([facility] [address] 2/3/2021    Emphysema of lung (HCC)     Follicular acne     as stated per pt. \"it comes and goes. Nothing helps it. I've been trying to tell doctors i need an antiobiotic.  Hx of blood clots     DVT'S (as stated per pt)    Hypertension     Hypoglycemia     Knee joint pain     Other and unspecified hyperlipidemia     PVD (peripheral vascular disease) ([facility] [address]     Sleep apnea     pt does not use cpap or bipap. Self diagnosed. Pt has not seen a physician about this.     Smoker     x 1 pack daily   ,   Past Surgical History:   Procedure Laterality Date    APPENDECTOMY      CARPAL TUNNEL RELEASE Right     DIAGNOSTIC CARDIAC CATH LAB PROCEDURE      7/2020    HEMORRHOID SURGERY      OTHER SURGICAL HISTORY  11/2015    Femoral stent placement    PAIN MANAGEMENT PROCEDURE N/A 2/11/2020    EPIDURAL STEROID INJECTION L3-4 performed by Deepak Guzman at [address] VASCULAR SURGERY      PTA/STENT LT FEMORAL ARTERY   ,   Social History     Tobacco Use    Smoking status: Current Every Day Smoker     Packs/day: 1.00     Years: 38.00     Pack years: 38.00     Start date: 11/26/1981    Smokeless tobacco: Never Used   Vaping Use    Vaping Use: Never used   Substance Use Topics    Alcohol use: No    Drug use: Not Currently   ,   Family History   Problem Relation Age of Onset    Cancer Father     Heart Disease Father     Heart Surgery Father     Diabetes Father     Hypertension Mother     Hypertension Sister     Heart Disease Sister     Stroke Maternal Grandmother     Heart Disease Maternal Grandmother     Heart Disease Paternal Uncle     Heart Disease Maternal Grandfather     Stroke Maternal Grandfather    ,   There is no immunization history on file for this patient.,   Health Maintenance   Topic Date Due    Meningococcal (ACWY) vaccine (1 - Risk start before 7 months 4-dose series) Never done    COVID-19 Vaccine (1) Never done    Low dose CT lung screening  Never done   Jennifer Flu vaccine (1) Never done    Potassium monitoring  10/07/2021    Creatinine monitoring  10/07/2021    Shingles Vaccine (1 of 2) 02/03/2022 (Originally 5/25/2014)    Pneumococcal 0-64 years Vaccine (1 of 4 - PCV13) 02/03/2022 (Originally 5/25/1970)    Hib vaccine (1 of 1 - Risk 1-dose series) 08/23/2022 (Originally 8/25/1965)    DTaP/Tdap/Td vaccine (1 - Tdap) 08/23/2022 (Originally 5/25/1983)    Meningococcal B vaccine (1 of 4 - Increased Risk Bexsero 2-dose series) 08/23/2022 (Originally 5/25/1974)    Hepatitis C screen  08/23/2022 (Originally 1965)    HIV screen  08/23/2022 (Originally 5/25/1979)    Annual Wellness Visit (AWV)  04/20/2022    Diabetes screen  02/03/2024    Lipid screen  08/04/2025    Colon cancer screen colonoscopy  02/27/2031    Hepatitis A vaccine  Aged Out    Hepatitis B vaccine  Aged Out       PHYSICAL EXAMINATION:  [ INSTRUCTIONS:  \"[x]\" Indicates a positive item  \"[]\" Indicates a negative item  -- DELETE ALL ITEMS NOT EXAMINED]  [x] Alert  [x] Oriented to person/place/time    [x] No apparent distress  [] Toxic appearing    [] Face flushed appearing [x] Sclera clear  [] Lips are cyanotic      [x] Breathing appears normal  [] Appears tachypneic      [] Rash on visible skin    [x] Cranial Nerves II-XII grossly intact    [x] Motor grossly intact in visible upper extremities    [x] Motor grossly intact in visible lower extremities    [x] Normal Mood  [] Anxious appearing    [] Depressed appearing  [] Confused appearing      [] Poor short term memory  [] Poor long term memory    [] OTHER:      Due to this being a TeleHealth encounter, evaluation of the following organ systems is limited: Vitals/Constitutional/EENT/Resp/CV/GI//MS/Neuro/Skin/Heme-Lymph-Imm. There were no vitals taken for this visit. Patient-Reported Vitals 11/1/2021   Patient-Reported Weight 260lbs   Patient-Reported Height 5'8\"          ASSESSMENT/PLAN:  1.  Left axillary hidradenitis    - clindamycin (CLEOCIN) 300 MG capsule; Take 1 capsule by mouth 4 times daily  Dispense: 40 capsule; Refill: 0      Return if symptoms worsen or fail to improve. An  electronic signature was used to authenticate this note. --PARVEZ Jeffery on 11/1/2021 at 2:51 PM        Pursuant to the emergency declaration under the [address] [place] [address] [facility] authority and the [employer] Act, this Virtual  Visit was conducted, with patient's consent, to reduce the patient's risk of exposure to COVID-19 and provide continuity of care for an established patient. Services were provided through a video synchronous discussion virtually to substitute for in-person clinic visit.

## 2021-11-05 ENCOUNTER — TELEPHONE (OUTPATIENT)
Dept: PRIMARY CARE CLINIC | Age: 56
End: 2021-11-05

## 2021-12-06 RX ORDER — ALBUTEROL SULFATE 90 UG/1
AEROSOL, METERED RESPIRATORY (INHALATION)
Qty: 8.5 G | Refills: 2 | Status: SHIPPED | OUTPATIENT
Start: 2021-12-06 | End: 2022-03-17

## 2021-12-06 NOTE — TELEPHONE ENCOUNTER
Chuy Elmer called to request a refill on his medication.       Last office visit : 11/1/2021   Next office visit : 2/28/2022     Requested Prescriptions     Signed Prescriptions Disp Refills    albuterol sulfate  (90 Base) MCG/ACT inhaler 8.5 g 2     Sig: INHALE 2 PUFFS BY MOUTH EVERY 6 HOURS AS NEEDED     Authorizing Provider: Jamee Francisco     Ordering User: Lara Suazo MA

## 2022-02-28 ENCOUNTER — OFFICE VISIT (OUTPATIENT)
Dept: PRIMARY CARE CLINIC | Age: 57
End: 2022-02-28
Payer: MEDICARE

## 2022-02-28 VITALS
SYSTOLIC BLOOD PRESSURE: 116 MMHG | BODY MASS INDEX: 43.5 KG/M2 | HEART RATE: 75 BPM | DIASTOLIC BLOOD PRESSURE: 74 MMHG | OXYGEN SATURATION: 96 % | HEIGHT: 68 IN | TEMPERATURE: 97.6 F | WEIGHT: 287 LBS

## 2022-02-28 DIAGNOSIS — I25.118 CORONARY ARTERY DISEASE OF NATIVE ARTERY OF NATIVE HEART WITH STABLE ANGINA PECTORIS (HCC): ICD-10-CM

## 2022-02-28 DIAGNOSIS — Z72.0 TOBACCO ABUSE DISORDER: ICD-10-CM

## 2022-02-28 DIAGNOSIS — E66.01 CLASS 3 SEVERE OBESITY WITH BODY MASS INDEX (BMI) OF 40.0 TO 44.9 IN ADULT, UNSPECIFIED OBESITY TYPE, UNSPECIFIED WHETHER SERIOUS COMORBIDITY PRESENT (HCC): Primary | ICD-10-CM

## 2022-02-28 DIAGNOSIS — E11.9 TYPE 2 DIABETES MELLITUS WITHOUT COMPLICATION, WITHOUT LONG-TERM CURRENT USE OF INSULIN (HCC): ICD-10-CM

## 2022-02-28 DIAGNOSIS — I73.9 PVD (PERIPHERAL VASCULAR DISEASE) (HCC): ICD-10-CM

## 2022-02-28 DIAGNOSIS — I10 ESSENTIAL HYPERTENSION: ICD-10-CM

## 2022-02-28 DIAGNOSIS — J44.9 CHRONIC OBSTRUCTIVE PULMONARY DISEASE, UNSPECIFIED COPD TYPE (HCC): ICD-10-CM

## 2022-02-28 LAB
ALBUMIN SERPL-MCNC: 4.4 G/DL (ref 3.5–5.2)
ALP BLD-CCNC: 94 U/L (ref 40–130)
ALT SERPL-CCNC: 22 U/L (ref 5–41)
ANION GAP SERPL CALCULATED.3IONS-SCNC: 14 MMOL/L (ref 7–19)
AST SERPL-CCNC: 18 U/L (ref 5–40)
ATYPICAL LYMPHOCYTE RELATIVE PERCENT: 6 % (ref 0–8)
BASOPHILS ABSOLUTE: 0 K/UL (ref 0–0.2)
BASOPHILS RELATIVE PERCENT: 0 % (ref 0–1)
BILIRUB SERPL-MCNC: <0.2 MG/DL (ref 0.2–1.2)
BUN BLDV-MCNC: 21 MG/DL (ref 6–20)
CALCIUM SERPL-MCNC: 10.2 MG/DL (ref 8.6–10)
CHLORIDE BLD-SCNC: 101 MMOL/L (ref 98–111)
CHOLESTEROL, TOTAL: 231 MG/DL (ref 160–199)
CO2: 24 MMOL/L (ref 22–29)
CREAT SERPL-MCNC: 1 MG/DL (ref 0.5–1.2)
EOSINOPHILS ABSOLUTE: 1.38 K/UL (ref 0–0.6)
EOSINOPHILS RELATIVE PERCENT: 9 % (ref 0–5)
GFR AFRICAN AMERICAN: >59
GFR NON-AFRICAN AMERICAN: >60
GLUCOSE BLD-MCNC: 79 MG/DL (ref 74–109)
HCT VFR BLD CALC: 44.3 % (ref 42–52)
HDLC SERPL-MCNC: 30 MG/DL (ref 55–121)
HEMOGLOBIN: 13.9 G/DL (ref 14–18)
IMMATURE GRANULOCYTES #: 0.1 K/UL
LDL CHOLESTEROL CALCULATED: 145 MG/DL
LYMPHOCYTES ABSOLUTE: 3.1 K/UL (ref 1.1–4.5)
LYMPHOCYTES RELATIVE PERCENT: 14 % (ref 20–40)
MCH RBC QN AUTO: 30 PG (ref 27–31)
MCHC RBC AUTO-ENTMCNC: 31.4 G/DL (ref 33–37)
MCV RBC AUTO: 95.7 FL (ref 80–94)
MONOCYTES ABSOLUTE: 1.7 K/UL (ref 0–0.9)
MONOCYTES RELATIVE PERCENT: 11 % (ref 0–10)
NEUTROPHILS ABSOLUTE: 9.2 K/UL (ref 1.5–7.5)
NEUTROPHILS RELATIVE PERCENT: 60 % (ref 50–65)
PDW BLD-RTO: 13.4 % (ref 11.5–14.5)
PLATELET # BLD: 439 K/UL (ref 130–400)
PLATELET SLIDE REVIEW: ABNORMAL
PMV BLD AUTO: 9.5 FL (ref 9.4–12.4)
POTASSIUM SERPL-SCNC: 4.3 MMOL/L (ref 3.5–5)
RBC # BLD: 4.63 M/UL (ref 4.7–6.1)
RBC # BLD: NORMAL 10*6/UL
SODIUM BLD-SCNC: 139 MMOL/L (ref 136–145)
TOTAL PROTEIN: 7.6 G/DL (ref 6.6–8.7)
TRIGL SERPL-MCNC: 282 MG/DL (ref 0–149)
TSH SERPL DL<=0.05 MIU/L-ACNC: 1.87 UIU/ML (ref 0.27–4.2)
WBC # BLD: 15.3 K/UL (ref 4.8–10.8)

## 2022-02-28 PROCEDURE — 2022F DILAT RTA XM EVC RTNOPTHY: CPT | Performed by: NURSE PRACTITIONER

## 2022-02-28 PROCEDURE — 3017F COLORECTAL CA SCREEN DOC REV: CPT | Performed by: NURSE PRACTITIONER

## 2022-02-28 PROCEDURE — G8427 DOCREV CUR MEDS BY ELIG CLIN: HCPCS | Performed by: NURSE PRACTITIONER

## 2022-02-28 PROCEDURE — 3023F SPIROM DOC REV: CPT | Performed by: NURSE PRACTITIONER

## 2022-02-28 PROCEDURE — 4004F PT TOBACCO SCREEN RCVD TLK: CPT | Performed by: NURSE PRACTITIONER

## 2022-02-28 PROCEDURE — G8484 FLU IMMUNIZE NO ADMIN: HCPCS | Performed by: NURSE PRACTITIONER

## 2022-02-28 PROCEDURE — 99214 OFFICE O/P EST MOD 30 MIN: CPT | Performed by: NURSE PRACTITIONER

## 2022-02-28 PROCEDURE — 3046F HEMOGLOBIN A1C LEVEL >9.0%: CPT | Performed by: NURSE PRACTITIONER

## 2022-02-28 PROCEDURE — G8417 CALC BMI ABV UP PARAM F/U: HCPCS | Performed by: NURSE PRACTITIONER

## 2022-02-28 SDOH — ECONOMIC STABILITY: FOOD INSECURITY: WITHIN THE PAST 12 MONTHS, THE FOOD YOU BOUGHT JUST DIDN'T LAST AND YOU DIDN'T HAVE MONEY TO GET MORE.: OFTEN TRUE

## 2022-02-28 SDOH — ECONOMIC STABILITY: FOOD INSECURITY: WITHIN THE PAST 12 MONTHS, YOU WORRIED THAT YOUR FOOD WOULD RUN OUT BEFORE YOU GOT MONEY TO BUY MORE.: OFTEN TRUE

## 2022-02-28 ASSESSMENT — ENCOUNTER SYMPTOMS
RHINORRHEA: 0
PHOTOPHOBIA: 0
NAUSEA: 0
SHORTNESS OF BREATH: 0
COUGH: 0
VOICE CHANGE: 0
BACK PAIN: 0
VOMITING: 0
COLOR CHANGE: 0

## 2022-02-28 ASSESSMENT — SOCIAL DETERMINANTS OF HEALTH (SDOH): HOW HARD IS IT FOR YOU TO PAY FOR THE VERY BASICS LIKE FOOD, HOUSING, MEDICAL CARE, AND HEATING?: VERY HARD

## 2022-02-28 NOTE — PROGRESS NOTES
[address] [facility]  [address] [address]  [address] [address]  Dept: [phone]  Dept Fax: [phone]  Loc: [phone]    Bo Delatorre is a 61 y.o. male who presents today for his medical conditions/complaints as noted below. Bo Delatorre is c/o of Follow-up (6mo, pt just did labs ) and Other (pt needs help getting a wheelchair )        HPI:     HPI   Chief Complaint   Patient presents with    Follow-up     6mo, pt just did labs     Other     pt needs help getting a wheelchair      Patient presents today for follow-up hypertension, DM, COPD. He reports blood sugar has been in normal range when he checks it. He denies chest pain or SOB. Blood pressure is stable. He had labs done prior to appointment today. He states his mobility is decreasing; he is hoping to get a tens unit. He would like to have a wheelchair for when he goes out but states he cannot afford the 20% portion of it yet. He is seeing Dr Savita Baez for pain management and Dr Alexandre Arango with rheumatology. He states he is no longer seeing vascular or cardiology. Past Medical History:   Diagnosis Date    Arthritis     Asthma     Bronchitis     CAD (coronary artery disease)     Cerebral artery occlusion with cerebral infarction (HCC)     Chronic obstructive pulmonary disease ([address] 2/3/2021    Emphysema of lung (HCC)     Follicular acne     as stated per pt. \"it comes and goes. Nothing helps it. I've been trying to tell doctors i need an antiobiotic.  Hx of blood clots     DVT'S (as stated per pt)    Hypertension     Hypoglycemia     Knee joint pain     Other and unspecified hyperlipidemia     PVD (peripheral vascular disease) ([facility] [address]     Sleep apnea     pt does not use cpap or bipap. Self diagnosed. Pt has not seen a physician about this.     Smoker     x 1 pack daily    Type 2 diabetes mellitus 2/28/2022      Past Surgical History:   Procedure Laterality Date    APPENDECTOMY  CARPAL TUNNEL RELEASE Right     DIAGNOSTIC CARDIAC CATH LAB PROCEDURE      7/2020    HEMORRHOID SURGERY      OTHER SURGICAL HISTORY  11/2015    Femoral stent placement    PAIN MANAGEMENT PROCEDURE N/A 2/11/2020    EPIDURAL STEROID INJECTION L3-4 performed by Deepak Guzman at [address] [facility]      PTA/STENT LT FEMORAL ARTERY       Vitals 2/28/2022 8/24/2021 8/24/2021 8/23/2021 8/23/2021 6/67/1955   SYSTOLIC 076 279 [phone] 282   DIASTOLIC 74 87 71 82 84 80   Site Left Upper Arm - - - Left Upper Arm Left Upper Arm   Position Sitting - - - Sitting -   Pulse 75 87 86 - 88 -   Temp 97.6 - 96.9 - 96.9 -   Resp - 18 18 - 18 -   SpO2 96 98 96 - 95 -   Weight 287 lb - - - 273 lb -   Height 5' 8\" - - - 5' 8\" -   Body mass index 43.63 kg/m2 - - - 41.5 kg/m2 -   Pain Level - 2 9 - - -   Some recent data might be hidden       Family History   Problem Relation Age of Onset    Cancer Father     Heart Disease Father     Heart Surgery Father     Diabetes Father     Hypertension Mother     Hypertension Sister     Heart Disease Sister     Stroke Maternal Grandmother     Heart Disease Maternal Grandmother     Heart Disease Paternal Uncle     Heart Disease Maternal Grandfather     Stroke Maternal Grandfather        Social History     Tobacco Use    Smoking status: Current Every Day Smoker     Packs/day: 1.00     Years: 38.00     Pack years: 38.00     Start date: 11/26/1981    Smokeless tobacco: Never Used   Substance Use Topics    Alcohol use: No      Current Outpatient Medications on File Prior to Visit   Medication Sig Dispense Refill    metFORMIN (GLUCOPHAGE) 500 MG tablet TAKE ONE TABLET BY MOUTH 2 TIMES A DAY WITH MEALS 180 tablet 0    albuterol (PROVENTIL) (2.5 MG/3ML) 0.083% nebulizer solution INHALE CONTENTS OF 1 VIAL VIA NEBULIZER FOUR TIMES A DAY AS NEEDED 180 mL 0    losartan (COZAAR) 50 MG tablet TAKE ONE TABLET BY MOUTH 2 TIMES A DAY IN THE MORNING AND EVENING 180 tablet 3    gabapentin (NEURONTIN) 300 MG capsule Take 300 mg by mouth 2 times daily. TAKE 1 TABLET BY MOUTH 2 TIMES DAILY      metoprolol succinate (TOPROL XL) 25 MG extended release tablet TAKE ONE TABLET BY MOUTH EVERY DAY 90 tablet 3    predniSONE (DELTASONE) 2.5 MG tablet Take 2.5 mg by mouth daily      hydroxychloroquine (PLAQUENIL) 200 MG tablet       loteprednol (LOTEMAX) 0.5 % ophthalmic suspension Place 2 drops into both eyes daily      phenylephrine (RICK-SYNEPHRINE) 1 % nasal spray 1 drop by Nasal route every 4 hours as needed for Congestion      albuterol sulfate  (90 Base) MCG/ACT inhaler INHALE 2 PUFFS BY MOUTH EVERY 6 HOURS AS NEEDED 8.5 g 2    clindamycin (CLEOCIN) 300 MG capsule Take 1 capsule by mouth 4 times daily (Patient not taking: Reported on 2/28/2022) 40 capsule 0    levETIRAcetam (KEPPRA) 250 MG tablet Take 250 mg by mouth 2 times daily (Patient not taking: Reported on 2/28/2022)      sulfaSALAzine (AZULFIDINE) 500 MG tablet  (Patient not taking: Reported on 7/28/2021)       No current facility-administered medications on file prior to visit.      Allergies   Allergen Reactions    Iodine      Iodine containing multi vitamin    Lisinopril      cough    Shellfish-Derived Products Swelling       Health Maintenance   Topic Date Due    Meningococcal (ACWY) vaccine (1 - Risk start 2-23 months series) Never done    COVID-19 Vaccine (1) Never done    Pneumococcal 0-64 years Vaccine (1 of 4 - PCV13) Never done    Shingles Vaccine (1 of 2) Never done    Low dose CT lung screening  Never done    Flu vaccine (1) Never done    Potassium monitoring  10/07/2021    Creatinine monitoring  10/07/2021    Hib vaccine (1 of 1 - Risk 1-dose series) 08/23/2022 (Originally 8/25/1966)    DTaP/Tdap/Td vaccine (1 - Tdap) 08/23/2022 (Originally 5/25/1983)    Meningococcal B vaccine (1 of 4 - Increased Risk Bexsero 2-dose series) 08/23/2022 (Originally 5/25/1974)  Hepatitis C screen  08/23/2022 (Originally 1965)    HIV screen  08/23/2022 (Originally 5/25/1979)    Annual Wellness Visit (AWV)  04/20/2022    Depression Screen  11/01/2022    Diabetes screen  02/03/2024    Lipid screen  08/04/2025    Colorectal Cancer Screen  02/27/2031    Hepatitis A vaccine  Aged Out    Hepatitis B vaccine  Aged Out       Subjective:      Review of Systems   Constitutional: Negative for chills and fever. HENT: Negative for ear pain, hearing loss, rhinorrhea and voice change. Eyes: Negative for photophobia and visual disturbance. Respiratory: Negative for cough and shortness of breath. Cardiovascular: Negative for chest pain and palpitations. Gastrointestinal: Negative for nausea and vomiting. Endocrine: Negative. Negative for cold intolerance and heat intolerance. Genitourinary: Negative for difficulty urinating and flank pain. Musculoskeletal: Negative for back pain and neck pain. Skin: Negative for color change and rash. Allergic/Immunologic: Negative for environmental allergies and food allergies. Neurological: Negative for dizziness, speech difficulty and headaches. Hematological: Does not bruise/bleed easily. Psychiatric/Behavioral: Negative for sleep disturbance and suicidal ideas. Objective:     Physical Exam  Vitals and nursing note reviewed. Constitutional:       Appearance: He is well-developed. HENT:      Head: Atraumatic. Right Ear: External ear normal.      Left Ear: External ear normal.      Nose: Nose normal.   Eyes:      Conjunctiva/sclera: Conjunctivae normal.      Pupils: Pupils are equal, round, and reactive to light. Cardiovascular:      Rate and Rhythm: Normal rate and regular rhythm. Heart sounds: Normal heart sounds, S1 normal and S2 normal.   Pulmonary:      Effort: Pulmonary effort is normal.      Breath sounds: Normal breath sounds.    Abdominal:      General: Bowel sounds are normal.      Palpations: Abdomen is soft. Musculoskeletal:         General: Normal range of motion. Cervical back: Normal range of motion and neck supple. Skin:     General: Skin is warm and dry. Neurological:      Mental Status: He is alert and oriented to person, place, and time. Psychiatric:         Behavior: Behavior normal.       /74 (Site: Left Upper Arm, Position: Sitting)   Pulse 75   Temp 97.6 °F (36.4 °C) (Temporal)   Ht 5' 8\" (1.727 m)   Wt 287 lb (130.2 kg)   SpO2 96%   BMI 43.64 kg/m²     Assessment:       Diagnosis Orders   1. Class 3 severe obesity with body mass index (BMI) of 40.0 to 44.9 in adult, unspecified obesity type, unspecified whether serious comorbidity present ([facility]     2. PVD (peripheral vascular disease) ([facility] [facility]     3. Type 2 diabetes mellitus without complication, without long-term current use of insulin ([facility]     4. Chronic obstructive pulmonary disease, unspecified COPD type ([facility] [facility]     5. Coronary artery disease of native artery of native heart with stable angina pectoris ([facility] [address]           Plan:   More than 50% of the time was spent counseling and coordinating care for a total time of 30 min face to face. Will contact patient with lab results. Follow-up in 6 months or sooner if needed. PDMP Monitoring:    Last PDMP [place] as Reviewed [facility]):  Review User Review Instant Review Result            Urine Drug Screenings (1 yr)    No resulted procedures found. Medication Contract and Consent for Opioid Use Documents Filed      No documents found                 Patient given educational materials -see patient instructions. Discussed use, benefit, and side effects of prescribed medications. All patient questions answered. Pt voiced understanding. Reviewed health maintenance. Instructed to continue currentmedications, diet and exercise. Patient agreed with treatment plan. Follow up as directed.    MEDICATIONS:  No orders of the defined types were placed in this encounter. ORDERS:  No orders of the defined types were placed in this encounter. Follow-up:  No follow-ups on file. PATIENT INSTRUCTIONS:  There are no Patient Instructions on file for this visit. Electronically signed by PARVEZ Kolb on 2/28/2022 at 1:10 PM    EMR Dragon/transcription disclaimer:  Much of thisencounter note is electronic transcription/translation of spoken language to printed texts. The electronic translation of spoken language may be erroneous, or at times, nonsensical words or phrases may be inadvertentlytranscribed.   Although I have reviewed the note for such errors, some may still exist.

## 2022-03-08 ENCOUNTER — TELEPHONE (OUTPATIENT)
Dept: PRIMARY CARE CLINIC | Age: 57
End: 2022-03-08

## 2022-03-09 ENCOUNTER — TELEPHONE (OUTPATIENT)
Dept: PRIMARY CARE CLINIC | Age: 57
End: 2022-03-09

## 2022-03-09 NOTE — TELEPHONE ENCOUNTER
----- Message from PARVEZ Villatoro sent at 3/7/2022  4:21 PM CST -----  [facility] [place] [facility]

## 2022-03-17 RX ORDER — ALBUTEROL SULFATE 90 UG/1
AEROSOL, METERED RESPIRATORY (INHALATION)
Qty: 8.5 G | Refills: 11 | Status: SHIPPED | OUTPATIENT
Start: 2022-03-17

## 2022-08-10 DIAGNOSIS — I10 ESSENTIAL HYPERTENSION: ICD-10-CM

## 2022-08-11 RX ORDER — METOPROLOL SUCCINATE 25 MG/1
TABLET, EXTENDED RELEASE ORAL
Qty: 90 TABLET | Refills: 0 | Status: SHIPPED | OUTPATIENT
Start: 2022-08-11

## 2022-09-13 ENCOUNTER — HOSPITAL ENCOUNTER (OUTPATIENT)
Dept: PAIN MANAGEMENT | Age: 57
Discharge: HOME | End: 2022-09-13
Payer: MEDICARE

## 2022-09-13 VITALS
HEART RATE: 84 BPM | DIASTOLIC BLOOD PRESSURE: 81 MMHG | OXYGEN SATURATION: 96 % | RESPIRATION RATE: 18 BRPM | SYSTOLIC BLOOD PRESSURE: 146 MMHG | TEMPERATURE: 96.8 F

## 2022-09-13 DIAGNOSIS — R52 PAIN MANAGEMENT: ICD-10-CM

## 2022-09-13 PROCEDURE — 3209999900 FLUORO FOR SURGICAL PROCEDURES

## 2022-09-13 PROCEDURE — A4216 STERILE WATER/SALINE, 10 ML: HCPCS

## 2022-09-13 PROCEDURE — G0260 INJ FOR SACROILIAC JT ANESTH: HCPCS

## 2022-09-13 PROCEDURE — 6360000002 HC RX W HCPCS

## 2022-09-13 PROCEDURE — 2500000003 HC RX 250 WO HCPCS

## 2022-09-13 PROCEDURE — 2580000003 HC RX 258

## 2022-09-13 RX ORDER — BUPIVACAINE HYDROCHLORIDE 5 MG/ML
2 INJECTION, SOLUTION EPIDURAL; INTRACAUDAL ONCE
Status: DISCONTINUED | OUTPATIENT
Start: 2022-09-13 | End: 2022-09-15 | Stop reason: HOSPADM

## 2022-09-13 RX ORDER — SODIUM CHLORIDE 9 MG/ML
3 INJECTION INTRAVENOUS 2 TIMES DAILY
Status: DISCONTINUED | OUTPATIENT
Start: 2022-09-13 | End: 2022-09-15 | Stop reason: HOSPADM

## 2022-09-13 RX ORDER — LIDOCAINE HYDROCHLORIDE 10 MG/ML
7 INJECTION, SOLUTION EPIDURAL; INFILTRATION; INTRACAUDAL; PERINEURAL ONCE
Status: DISCONTINUED | OUTPATIENT
Start: 2022-09-13 | End: 2022-09-15 | Stop reason: HOSPADM

## 2022-09-13 RX ORDER — METHYLPREDNISOLONE ACETATE 40 MG/ML
80 INJECTION, SUSPENSION INTRA-ARTICULAR; INTRALESIONAL; INTRAMUSCULAR; SOFT TISSUE ONCE
Status: DISCONTINUED | OUTPATIENT
Start: 2022-09-13 | End: 2022-09-15 | Stop reason: HOSPADM

## 2022-09-13 ASSESSMENT — PAIN DESCRIPTION - FREQUENCY: FREQUENCY: CONTINUOUS

## 2022-09-13 ASSESSMENT — PAIN - FUNCTIONAL ASSESSMENT
PAIN_FUNCTIONAL_ASSESSMENT: 0-10
PAIN_FUNCTIONAL_ASSESSMENT: PREVENTS OR INTERFERES SOME ACTIVE ACTIVITIES AND ADLS

## 2022-09-13 ASSESSMENT — PAIN DESCRIPTION - PAIN TYPE: TYPE: CHRONIC PAIN

## 2022-09-13 ASSESSMENT — PAIN DESCRIPTION - ORIENTATION: ORIENTATION: LOWER;LEFT

## 2022-09-13 ASSESSMENT — PAIN DESCRIPTION - DESCRIPTORS: DESCRIPTORS: NUMBNESS;SHOOTING

## 2022-09-13 ASSESSMENT — PAIN DESCRIPTION - LOCATION: LOCATION: BACK;SACRUM

## 2022-09-13 ASSESSMENT — PAIN SCALES - GENERAL: PAINLEVEL_OUTOF10: 7

## 2022-09-13 NOTE — INTERVAL H&P NOTE
Update History & Physical    The patient's History and Physical  was reviewed with the patient and I examined the patient. There was no change. The surgical site was confirmed by the patient and me. Plan: The risks, benefits, expected outcome, and alternative to the recommended procedure have been discussed with the patient. Patient understands and wants to proceed with the procedure.      Electronically signed by Daryle Held, MD on 9/13/2022 at 9:59 AM

## 2022-09-14 NOTE — TELEPHONE ENCOUNTER
Shira Yanez called requesting a refill of the below medication which has been pended for you:     Requested Prescriptions     Pending Prescriptions Disp Refills    losartan (COZAAR) 50 MG tablet [Pharmacy Med Name: LOSARTAN POTASSIUM 50 MG TAB] 60 tablet 0     Sig: TAKE ONE TABLET BY MOUTH 2 TIMES A DAY IN THE MORNING AND EVENING       Last Appointment Date: 2/28/2022  Next Appointment Date: 10/11/2022    Allergies   Allergen Reactions    Iodine      Iodine containing multi vitamin    Lisinopril      cough    Shellfish-Derived Products Swelling

## 2022-09-15 RX ORDER — LOSARTAN POTASSIUM 50 MG/1
TABLET ORAL
Qty: 60 TABLET | Refills: 0 | Status: SHIPPED | OUTPATIENT
Start: 2022-09-15 | End: 2022-10-18

## 2022-10-08 RX ORDER — ALBUTEROL SULFATE 2.5 MG/3ML
SOLUTION RESPIRATORY (INHALATION)
Qty: 180 ML | Refills: 0 | Status: SHIPPED | OUTPATIENT
Start: 2022-10-08 | End: 2022-10-11 | Stop reason: SDUPTHER

## 2022-10-11 ENCOUNTER — OFFICE VISIT (OUTPATIENT)
Dept: PRIMARY CARE CLINIC | Age: 57
End: 2022-10-11
Payer: MEDICARE

## 2022-10-11 VITALS
HEIGHT: 68 IN | SYSTOLIC BLOOD PRESSURE: 148 MMHG | BODY MASS INDEX: 42.59 KG/M2 | TEMPERATURE: 97.2 F | DIASTOLIC BLOOD PRESSURE: 88 MMHG | HEART RATE: 80 BPM | WEIGHT: 281 LBS | OXYGEN SATURATION: 97 %

## 2022-10-11 DIAGNOSIS — H04.123 CHRONIC DRYNESS OF BOTH EYES: ICD-10-CM

## 2022-10-11 DIAGNOSIS — E11.9 TYPE 2 DIABETES MELLITUS WITHOUT COMPLICATION, WITHOUT LONG-TERM CURRENT USE OF INSULIN (HCC): ICD-10-CM

## 2022-10-11 DIAGNOSIS — J30.9 ALLERGIC RHINITIS, UNSPECIFIED SEASONALITY, UNSPECIFIED TRIGGER: ICD-10-CM

## 2022-10-11 DIAGNOSIS — Z72.0 TOBACCO ABUSE DISORDER: ICD-10-CM

## 2022-10-11 DIAGNOSIS — R26.81 GAIT INSTABILITY: ICD-10-CM

## 2022-10-11 DIAGNOSIS — M48.00 SPINAL STENOSIS, UNSPECIFIED SPINAL REGION: ICD-10-CM

## 2022-10-11 DIAGNOSIS — I10 ESSENTIAL HYPERTENSION: Primary | ICD-10-CM

## 2022-10-11 DIAGNOSIS — F33.0 MAJOR DEPRESSIVE DISORDER, RECURRENT, MILD (HCC): ICD-10-CM

## 2022-10-11 PROCEDURE — G0008 ADMIN INFLUENZA VIRUS VAC: HCPCS | Performed by: NURSE PRACTITIONER

## 2022-10-11 PROCEDURE — G8482 FLU IMMUNIZE ORDER/ADMIN: HCPCS | Performed by: NURSE PRACTITIONER

## 2022-10-11 PROCEDURE — G8417 CALC BMI ABV UP PARAM F/U: HCPCS | Performed by: NURSE PRACTITIONER

## 2022-10-11 PROCEDURE — 90674 CCIIV4 VAC NO PRSV 0.5 ML IM: CPT | Performed by: NURSE PRACTITIONER

## 2022-10-11 PROCEDURE — 90670 PCV13 VACCINE IM: CPT | Performed by: NURSE PRACTITIONER

## 2022-10-11 PROCEDURE — G8427 DOCREV CUR MEDS BY ELIG CLIN: HCPCS | Performed by: NURSE PRACTITIONER

## 2022-10-11 PROCEDURE — 99406 BEHAV CHNG SMOKING 3-10 MIN: CPT | Performed by: NURSE PRACTITIONER

## 2022-10-11 PROCEDURE — 99215 OFFICE O/P EST HI 40 MIN: CPT | Performed by: NURSE PRACTITIONER

## 2022-10-11 PROCEDURE — G0009 ADMIN PNEUMOCOCCAL VACCINE: HCPCS | Performed by: NURSE PRACTITIONER

## 2022-10-11 PROCEDURE — 3046F HEMOGLOBIN A1C LEVEL >9.0%: CPT | Performed by: NURSE PRACTITIONER

## 2022-10-11 PROCEDURE — 2022F DILAT RTA XM EVC RTNOPTHY: CPT | Performed by: NURSE PRACTITIONER

## 2022-10-11 PROCEDURE — 4004F PT TOBACCO SCREEN RCVD TLK: CPT | Performed by: NURSE PRACTITIONER

## 2022-10-11 PROCEDURE — 3017F COLORECTAL CA SCREEN DOC REV: CPT | Performed by: NURSE PRACTITIONER

## 2022-10-11 RX ORDER — FLUTICASONE PROPIONATE 50 MCG
1 SPRAY, SUSPENSION (ML) NASAL DAILY
Qty: 32 G | Refills: 1 | Status: SHIPPED | OUTPATIENT
Start: 2022-10-11

## 2022-10-11 RX ORDER — BUPROPION HYDROCHLORIDE 150 MG/1
150 TABLET ORAL EVERY MORNING
Qty: 30 TABLET | Refills: 0 | Status: SHIPPED | OUTPATIENT
Start: 2022-10-11

## 2022-10-11 RX ORDER — AZELASTINE 1 MG/ML
2 SPRAY, METERED NASAL 2 TIMES DAILY
Qty: 60 ML | Refills: 5 | Status: SHIPPED | OUTPATIENT
Start: 2022-10-11

## 2022-10-11 RX ORDER — ALBUTEROL SULFATE 2.5 MG/3ML
SOLUTION RESPIRATORY (INHALATION)
Qty: 180 ML | Refills: 5 | Status: SHIPPED | OUTPATIENT
Start: 2022-10-11

## 2022-10-11 ASSESSMENT — ENCOUNTER SYMPTOMS
PHOTOPHOBIA: 0
COLOR CHANGE: 0
NAUSEA: 0
VOMITING: 0
BACK PAIN: 1
VOICE CHANGE: 0
SHORTNESS OF BREATH: 0
COUGH: 0
RHINORRHEA: 0

## 2022-10-11 ASSESSMENT — PATIENT HEALTH QUESTIONNAIRE - PHQ9
1. LITTLE INTEREST OR PLEASURE IN DOING THINGS: 0
2. FEELING DOWN, DEPRESSED OR HOPELESS: 0
SUM OF ALL RESPONSES TO PHQ QUESTIONS 1-9: 0
SUM OF ALL RESPONSES TO PHQ QUESTIONS 1-9: 0
SUM OF ALL RESPONSES TO PHQ9 QUESTIONS 1 & 2: 0
SUM OF ALL RESPONSES TO PHQ QUESTIONS 1-9: 0
SUM OF ALL RESPONSES TO PHQ QUESTIONS 1-9: 0

## 2022-10-11 NOTE — PROGRESS NOTES
[address] [facility]  [address] [address]  [address] [address]  Dept: [phone]  Dept Fax: [phone]  Loc: [phone]    Daniela Ivan is a 61 y.o. male who presents today for his medical conditions/complaints as noted below. Daniela Ivan is c/o of Follow-up (Having back issues) and Discuss Medications        HPI:     HPI   Chief Complaint   Patient presents with    Follow-up     Having back issues    Discuss Medications     Patient presents today for follow-up hypertension, DM, COPD. He complains of worsening dry eyes; he states he decreased Losartan to once daily. Blood pressure is increased in office today at 148/88. He reports he had stopped metformin for 4-5 months and recently restarted it. He does not check blood sugars often. He would like to quite smoking but is afraid of weight gain. He we would like to lose weight. He  reports that he has been smoking. He started smoking about 40 years ago. He has a 38.00 pack-year smoking history. He has never used smokeless tobacco.    Patient has spinal stenosis and is having increasing difficulty walking; he is unable to walk much distance without assistance. He states his TENS unit is 21+ years old and would like a new one. Past Medical History:   Diagnosis Date    Arthritis     Asthma     Bronchitis     CAD (coronary artery disease)     Cerebral artery occlusion with cerebral infarction [facility])     Chronic obstructive pulmonary disease ([facility] [address] 2/3/2021    Emphysema of lung (HCC)     Follicular acne     as stated per pt. \"it comes and goes. Nothing helps it. I've been trying to tell doctors i need an antiobiotic. Hx of blood clots     DVT'S (as stated per pt)    Hypertension     Hypoglycemia     Knee joint pain     Other and unspecified hyperlipidemia     PVD (peripheral vascular disease) ([facility] [address]     Sleep apnea     pt does not use cpap or bipap. Self diagnosed.  Pt has not seen a physician about this.    Smoker     x 1 pack daily    Type 2 diabetes mellitus 2/28/2022      Past Surgical History:   Procedure Laterality Date    APPENDECTOMY      CARPAL TUNNEL RELEASE Right     DIAGNOSTIC CARDIAC CATH LAB PROCEDURE      7/2020    HEMORRHOID SURGERY      OTHER SURGICAL HISTORY  11/2015    Femoral stent placement    PAIN MANAGEMENT PROCEDURE N/A 2/11/2020    EPIDURAL STEROID INJECTION L3-4 performed by Deepak Guzman [facility]      PTA/STENT LT FEMORAL ARTERY       Vitals 10/11/2022 9/13/2022 9/13/2022 2/28/2022 8/24/2021 9/33/7068   SYSTOLIC 788 823 [phone]   DIASTOLIC 88 81 69 74 87 71   Site - - - Left Upper Arm - -   Position - - - Sitting - -   Pulse 80 84 88 75 87 86   Temp 97.2 - 96.8 97.6 - 96.9   Resp - 18 18 - 18 18   SpO2 97 96 95 96 98 96   Weight 281 lb - - 287 lb - -   Height 5' 8\" - - 5' 8\" - -   Body mass index 42.72 kg/m2 - - 43.63 kg/m2 - -   Pain Level - 8 7 - 2 9   Some recent data might be hidden       Family History   Problem Relation Age of Onset    Cancer Father     Heart Disease Father     Heart Surgery Father     Diabetes Father     Hypertension Mother     Hypertension Sister     Heart Disease Sister     Stroke Maternal Grandmother     Heart Disease Maternal Grandmother     Heart Disease Paternal Uncle     Heart Disease Maternal Grandfather     Stroke Maternal Grandfather        Social History     Tobacco Use    Smoking status: Every Day     Packs/day: 1.00     Years: 38.00     Pack years: 38.00     Types: Cigarettes     Start date: 11/26/1981    Smokeless tobacco: Never   Substance Use Topics    Alcohol use: No      Current Outpatient Medications on File Prior to Visit   Medication Sig Dispense Refill    albuterol (PROVENTIL) (2.5 MG/3ML) 0.083% nebulizer solution INHALE CONTENTS OF 1 VIAL VIA NEBULIZER FOUR TIMES A DAY AS NEEDED 180 mL 0    losartan (COZAAR) 50 MG tablet TAKE ONE TABLET BY MOUTH 2 TIMES A DAY IN THE MORNING AND EVENING 60 tablet 0    metFORMIN (GLUCOPHAGE) 500 MG tablet TAKE ONE TABLET BY MOUTH 2 TIMES A DAY WITH MEALS 180 tablet 0    albuterol sulfate  (90 Base) MCG/ACT inhaler INHALE 2 PUFFS BY MOUTH EVERY 6 HOURS AS NEEDED 8.5 g 11    gabapentin (NEURONTIN) 300 MG capsule Take 300 mg by mouth 4 times daily. TAKE 1 TABLET BY MOUTH 2 TIMES DAILY      predniSONE (DELTASONE) 2.5 MG tablet Take 2.5 mg by mouth daily      hydroxychloroquine (PLAQUENIL) 200 MG tablet       loteprednol (LOTEMAX) 0.5 % ophthalmic suspension Place 2 drops into both eyes daily      phenylephrine (RICK-SYNEPHRINE) 1 % nasal spray 1 drop by Nasal route every 4 hours as needed for Congestion      metoprolol succinate (TOPROL XL) 25 MG extended release tablet TAKE ONE TABLET BY MOUTH EVERY DAY (Patient not taking: Reported on 10/11/2022) 90 tablet 0     No current facility-administered medications on file prior to visit.      Allergies   Allergen Reactions    Iodine      Iodine containing multi vitamin    Lisinopril      cough    Shellfish-Derived Products Swelling       Health Maintenance   Topic Date Due    COVID-19 Vaccine (1) Never done    Meningococcal (ACWY) vaccine (1 - Risk start 2-23 months series) Never done    Hib vaccine (1 of 1 - Risk 1-dose series) Never done    Pneumococcal 0-64 years Vaccine (1 - PCV) Never done    Diabetic foot exam  Never done    Meningococcal B vaccine (1 of 4 - Increased Risk Bexsero 2-dose series) Never done    HIV screen  Never done    Diabetic microalbuminuria test  Never done    Diabetic retinal exam  Never done    Hepatitis C screen  Never done    Hepatitis B vaccine (1 of 3 - Risk 3-dose series) Never done    DTaP/Tdap/Td vaccine (1 - Tdap) Never done    Shingles vaccine (1 of 2) Never done    Low dose CT lung screening  Never done    A1C test (Diabetic or Prediabetic)  02/03/2022    Annual Wellness Visit (AWV)  04/20/2022    Flu vaccine (1) Never done    Lipids  02/28/2023    Depression Screen  10/11/2023    Colorectal Cancer Screen  02/27/2031    Hepatitis A vaccine  Aged Out       Subjective:      Review of Systems   Constitutional:  Negative for chills and fever. HENT:  Negative for ear pain, hearing loss, rhinorrhea and voice change. Eyes:  Negative for photophobia and visual disturbance. Respiratory:  Negative for cough and shortness of breath. Cardiovascular:  Negative for chest pain and palpitations. Gastrointestinal:  Negative for nausea and vomiting. Endocrine: Negative. Negative for cold intolerance and heat intolerance. Genitourinary:  Negative for difficulty urinating and flank pain. Musculoskeletal:  Positive for arthralgias, back pain and gait problem. Negative for neck pain. Skin:  Negative for color change and rash. Allergic/Immunologic: Negative for environmental allergies and food allergies. Neurological:  Negative for dizziness, speech difficulty and headaches. Hematological:  Does not bruise/bleed easily. Psychiatric/Behavioral:  Negative for sleep disturbance and suicidal ideas. Objective:     Physical Exam  Vitals and nursing note reviewed. Constitutional:       Appearance: He is well-developed. HENT:      Head: Atraumatic. Right Ear: External ear normal.      Left Ear: External ear normal.      Nose: Nose normal.   Eyes:      Conjunctiva/sclera: Conjunctivae normal.      Pupils: Pupils are equal, round, and reactive to light. Cardiovascular:      Rate and Rhythm: Normal rate and regular rhythm. Heart sounds: Normal heart sounds, S1 normal and S2 normal.   Pulmonary:      Effort: Pulmonary effort is normal.      Breath sounds: Normal breath sounds. Abdominal:      General: Bowel sounds are normal.      Palpations: Abdomen is soft. Musculoskeletal:         General: Normal range of motion. Cervical back: Normal range of motion and neck supple. Skin:     General: Skin is warm and dry.    Neurological:      Mental Status: He is alert and oriented to person, place, and time. Psychiatric:         Behavior: Behavior normal.     BP (!) 148/88   Pulse 80   Temp 97.2 °F (36.2 °C) (Temporal)   Ht 5' 8\" (1.727 m)   Wt 281 lb (127.5 kg)   SpO2 97%   BMI 42.73 kg/m²     Assessment:       Diagnosis Orders   1. Essential hypertension  CBC with Auto Differential    Comprehensive Metabolic Panel    Hemoglobin A1C    Lipid Panel    TSH    PSA Screening    Testosterone    Microalbumin / Creatinine Urine Ratio    DME Order for Walker as OP      2. Tobacco abuse disorder  buPROPion (WELLBUTRIN XL) 150 MG extended release tablet      3. Type 2 diabetes mellitus without complication, without long-term current use of insulin ([facility])  CBC with Auto Differential    Comprehensive Metabolic Panel    Hemoglobin A1C    Lipid Panel    TSH    PSA Screening    Testosterone    Microalbumin / Creatinine Urine Ratio    DME Order for Perla Jose L as OP      4. Gait instability  DME Order for Walker as OP      5. Chronic dryness of both eyes        6. Allergic rhinitis, unspecified seasonality, unspecified trigger        7. Major depressive disorder, recurrent, mild        8. Spinal stenosis, unspecified spinal region              Plan:   More than 50% of the time was spent counseling and coordinating care for a total time of 40min face to face. Fasting labs in [address]. Begin Wellbutrin  mg daily. Flonase and astelin nasal sprays as directed. Follow-up in 1 month for medication check; 6 month routine visit. TENS and rollator orders placed. Stop smoking. Approximately 5 minutes of education was provided about quit smoking and the harms of tobacco.  Patient does show understanding. Patient has  the desire to quit smoking in the future. PDMP Monitoring:    Last PDMP [place] as Reviewed:  Review User Review Instant Review Result            Urine Drug Screenings (1 yr)    No resulted procedures found.        Medication Contract and Consent for Opioid Use Documents Filed        No documents found                     Patient given educational materials -see patient instructions. Discussed use, benefit, and side effects of prescribed medications. All patient questions answered. Pt voiced understanding. Reviewed health maintenance. Instructed to continue currentmedications, diet and exercise. Patient agreed with treatment plan. Follow up as directed. MEDICATIONS:  Orders Placed This Encounter   Medications    buPROPion (WELLBUTRIN XL) 150 MG extended release tablet     Sig: Take 1 tablet by mouth every morning     Dispense:  30 tablet     Refill:  0    azelastine (ASTELIN) 0.1 % nasal spray     Si sprays by Nasal route 2 times daily Use in each nostril as directed     Dispense:  60 mL     Refill:  5    fluticasone (FLONASE) 50 MCG/ACT nasal spray     Si spray by Each Nostril route daily     Dispense:  32 g     Refill:  1           ORDERS:  Orders Placed This Encounter   Procedures    CBC with Auto Differential    Comprehensive Metabolic Panel    Hemoglobin A1C    Lipid Panel    TSH    PSA Screening    Testosterone    Microalbumin / Creatinine Urine Ratio    DME Order for Walker as OP         Follow-up:  Return for medication check, video visit. PATIENT INSTRUCTIONS:  Patient Instructions   Fasting labs in [address]. Begin Wellbutrin  mg daily. Flonase and astelin nasal sprays as directed. Follow-up in 1 month for medication check. Electronically signed by PARVEZ Kolb on 10/11/2022 at 9:25 AM    EMR Dragon/transcription disclaimer:  Much of thisencounter note is electronic transcription/translation of spoken language to printed texts. The electronic translation of spoken language may be erroneous, or at times, nonsensical words or phrases may be inadvertentlytranscribed.   Although I have reviewed the note for such errors, some may still exist.

## 2022-10-11 NOTE — PATIENT INSTRUCTIONS
Fasting labs in [address]. Begin Wellbutrin  mg daily. Flonase and astelin nasal sprays as directed. Follow-up in 1 month for medication check; 6 month routine visit. TENS and rollator orders placed. Stop smoking.

## 2022-10-18 RX ORDER — LOSARTAN POTASSIUM 50 MG/1
TABLET ORAL
Qty: 60 TABLET | Refills: 3 | Status: SHIPPED | OUTPATIENT
Start: 2022-10-18

## 2022-10-18 NOTE — TELEPHONE ENCOUNTER
Elia Chavez called to request a refill on his medication.       Last office visit : 10/11/2022   Next office visit : 11/14/2022     Requested Prescriptions     Pending Prescriptions Disp Refills    losartan (COZAAR) 50 MG tablet [Pharmacy Med Name: LOSARTAN POTASSIUM 50 MG TAB] 60 tablet 3     Sig: TAKE ONE TABLET BY MOUTH 2 TIMES A DAY IN THE MORNING AND EVENING            Cheyanne Amaya, BETHN

## 2022-11-14 ENCOUNTER — TELEMEDICINE (OUTPATIENT)
Dept: PRIMARY CARE CLINIC | Age: 57
End: 2022-11-14
Payer: MEDICARE

## 2022-11-14 DIAGNOSIS — F33.0 MAJOR DEPRESSIVE DISORDER, RECURRENT, MILD (HCC): Primary | ICD-10-CM

## 2022-11-14 DIAGNOSIS — Z72.0 TOBACCO ABUSE DISORDER: ICD-10-CM

## 2022-11-14 PROCEDURE — G8417 CALC BMI ABV UP PARAM F/U: HCPCS | Performed by: NURSE PRACTITIONER

## 2022-11-14 PROCEDURE — 3017F COLORECTAL CA SCREEN DOC REV: CPT | Performed by: NURSE PRACTITIONER

## 2022-11-14 PROCEDURE — G8482 FLU IMMUNIZE ORDER/ADMIN: HCPCS | Performed by: NURSE PRACTITIONER

## 2022-11-14 PROCEDURE — 4004F PT TOBACCO SCREEN RCVD TLK: CPT | Performed by: NURSE PRACTITIONER

## 2022-11-14 PROCEDURE — 99213 OFFICE O/P EST LOW 20 MIN: CPT | Performed by: NURSE PRACTITIONER

## 2022-11-14 PROCEDURE — G8427 DOCREV CUR MEDS BY ELIG CLIN: HCPCS | Performed by: NURSE PRACTITIONER

## 2022-11-14 RX ORDER — BUPROPION HYDROCHLORIDE 150 MG/1
150 TABLET ORAL EVERY MORNING
Qty: 90 TABLET | Refills: 1 | Status: SHIPPED | OUTPATIENT
Start: 2022-11-14

## 2022-11-14 ASSESSMENT — ENCOUNTER SYMPTOMS
BACK PAIN: 0
RHINORRHEA: 0
VOICE CHANGE: 0
COUGH: 0
NAUSEA: 0
COLOR CHANGE: 0
SHORTNESS OF BREATH: 0
VOMITING: 0
PHOTOPHOBIA: 0

## 2022-11-14 NOTE — PROGRESS NOTES
[address], [facility]             Phone:  [phone]  Fax:  [phone]       2022    TELEHEALTH EVALUATION -- Audio/Visual (During PBOCP-39 public health emergency)    HPI:  Chief Complaint   Patient presents with    Discuss Medications         Brimley David (:  1965) has requested an audio/video evaluation for the following concern(s):    Patient presents today for follow-up depression. He started wellbutrin  mg one month ago. He states he has really noticed a difference and improved mood; he is feeling more motivated. He has suppressed appetite and is also significantly decreasing how many cigarettes he smokes daily. Review of Systems   Constitutional:  Negative for chills and fever. HENT:  Negative for ear pain, hearing loss, rhinorrhea and voice change. Eyes:  Negative for photophobia and visual disturbance. Respiratory:  Negative for cough and shortness of breath. Cardiovascular:  Negative for chest pain and palpitations. Gastrointestinal:  Negative for nausea and vomiting. Endocrine: Negative. Negative for cold intolerance and heat intolerance. Genitourinary:  Negative for difficulty urinating and flank pain. Musculoskeletal:  Negative for back pain and neck pain. Skin:  Negative for color change and rash. Allergic/Immunologic: Negative for environmental allergies and food allergies. Neurological:  Negative for dizziness, speech difficulty and headaches. Hematological:  Does not bruise/bleed easily. Psychiatric/Behavioral:  Negative for sleep disturbance and suicidal ideas. Prior to Visit Medications    Medication Sig Taking?  Authorizing Provider   buPROPion (WELLBUTRIN XL) 150 MG extended release tablet Take 1 tablet by mouth every morning Yes PARVEZ Mitchell   losartan (COZAAR) 50 MG tablet TAKE ONE TABLET BY MOUTH 2 TIMES A DAY IN THE MORNING AND EVENING Yes PARVEZ Mitchell   azelastine (ASTELIN) 0.1 % nasal spray 2 sprays by Nasal route 2 times daily Use in each nostril as directed Yes PARVEZ Sy   fluticasone (FLONASE) 50 MCG/ACT nasal spray 1 spray by Each Nostril route daily Yes PARVEZ Sy   albuterol (PROVENTIL) (2.5 MG/3ML) 0.083% nebulizer solution INHALE CONTENTS OF 1 VIAL VIA NEBULIZER FOUR TIMES A DAY AS NEEDED Yes PARVEZ Sy   metFORMIN (GLUCOPHAGE) 500 MG tablet TAKE ONE TABLET BY MOUTH 2 TIMES A DAY WITH MEALS Yes PARVEZ Sy   metoprolol succinate (TOPROL XL) 25 MG extended release tablet TAKE ONE TABLET BY MOUTH EVERY DAY Yes PARVEZ Sy   albuterol sulfate  (90 Base) MCG/ACT inhaler INHALE 2 PUFFS BY MOUTH EVERY 6 HOURS AS NEEDED Yes PAREVZ Sy   gabapentin (NEURONTIN) 300 MG capsule Take 300 mg by mouth 4 times daily.  TAKE 1 TABLET BY MOUTH 2 TIMES DAILY Yes Historical Provider, MD   predniSONE (DELTASONE) 2.5 MG tablet Take 2.5 mg by mouth daily Yes Historical Provider, MD   hydroxychloroquine (PLAQUENIL) 200 MG tablet  Yes Historical Provider, MD   loteprednol (LOTEMAX) 0.5 % ophthalmic suspension Place 2 drops into both eyes daily Yes Historical Provider, MD   phenylephrine (RICK-SYNEPHRINE) 1 % nasal spray 1 drop by Nasal route every 4 hours as needed for Congestion Yes Historical Provider, MD       Social History     Tobacco Use    Smoking status: Every Day     Packs/day: 1.00     Years: 38.00     Pack years: 38.00     Types: Cigarettes     Start date: 11/26/1981    Smokeless tobacco: Never   Vaping Use    Vaping Use: Never used   Substance Use Topics    Alcohol use: No    Drug use: Not Currently        Allergies   Allergen Reactions    Iodine      Iodine containing multi vitamin    Lisinopril      cough    Shellfish-Derived Products Swelling   ,   Past Medical History:   Diagnosis Date    Arthritis     Asthma     Bronchitis     CAD (coronary artery disease)     Cerebral artery occlusion with cerebral infarction [facility])     Chronic obstructive pulmonary disease ([facility] [address] 2/3/2021    Emphysema of lung (HCC)     Follicular acne     as stated per pt. \"it comes and goes. Nothing helps it. I've been trying to tell doctors i need an antiobiotic. Hx of blood clots     DVT'S (as stated per pt)    Hypertension     Hypoglycemia     Knee joint pain     Other and unspecified hyperlipidemia     PVD (peripheral vascular disease) ([facility] [address]     Sleep apnea     pt does not use cpap or bipap. Self diagnosed. Pt has not seen a physician about this.     Smoker     x 1 pack daily    Type 2 diabetes mellitus 2/28/2022   ,   Past Surgical History:   Procedure Laterality Date    APPENDECTOMY      CARPAL TUNNEL RELEASE Right     DIAGNOSTIC CARDIAC CATH LAB PROCEDURE      7/2020    HEMORRHOID SURGERY      OTHER SURGICAL HISTORY  11/2015    Femoral stent placement    PAIN MANAGEMENT PROCEDURE N/A 2/11/2020    EPIDURAL STEROID INJECTION L3-4 performed by Deepak Guzman [place] [id number]      PTA/STENT LT FEMORAL ARTERY   ,   Social History     Tobacco Use    Smoking status: Every Day     Packs/day: 1.00     Years: 38.00     Pack years: 38.00     Types: Cigarettes     Start date: 11/26/1981    Smokeless tobacco: Never   Vaping Use    Vaping Use: Never used   Substance Use Topics    Alcohol use: No    Drug use: Not Currently   ,   Family History   Problem Relation Age of Onset    Cancer Father     Heart Disease Father     Heart Surgery Father     Diabetes Father     Hypertension Mother     Hypertension Sister     Heart Disease Sister     Stroke Maternal Grandmother     Heart Disease Maternal Grandmother     Heart Disease Paternal Uncle     Heart Disease Maternal Grandfather     Stroke Maternal Grandfather    ,   Immunization History   Administered Date(s) Administered    Influenza, FLUCELVAX, (age 10 mo+), MDCK, PF, 0.5mL 10/11/2022    Pneumococcal Conjugate 13-valent (Sukhjinder Cuff) 10/11/2022   , Health Maintenance   Topic Date Due    COVID-19 Vaccine (1) Never done    Meningococcal (ACWY) vaccine (1 - Risk start 2-23 months series) Never done    Hib vaccine (1 of 1 - Risk 1-dose series) Never done    Diabetic foot exam  Never done    Meningococcal B vaccine (1 of 4 - Increased Risk Bexsero 2-dose series) Never done    HIV screen  Never done    Diabetic microalbuminuria test  Never done    Diabetic retinal exam  Never done    Hepatitis C screen  Never done    Hepatitis B vaccine (1 of 3 - Risk 3-dose series) Never done    DTaP/Tdap/Td vaccine (1 - Tdap) Never done    Shingles vaccine (1 of 2) Never done    Low dose CT lung screening  Never done    A1C test (Diabetic or Prediabetic)  02/03/2022    Annual Wellness Visit (AWV)  04/20/2022    Lipids  02/28/2023    Depression Monitoring  10/11/2023    Pneumococcal 0-64 years Vaccine (2 - PPSV23 if available, else PCV20) 10/11/2023    Colorectal Cancer Screen  02/27/2031    Flu vaccine  Completed    Hepatitis A vaccine  Aged Out       PHYSICAL EXAMINATION:  [ INSTRUCTIONS:  \"[x]\" Indicates a positive item  \"[]\" Indicates a negative item  -- DELETE ALL ITEMS NOT EXAMINED]  [x] Alert  [x] Oriented to person/place/time    [x] No apparent distress  [] Toxic appearing    [] Face flushed appearing [x] Sclera clear  [] Lips are cyanotic      [x] Breathing appears normal  [] Appears tachypneic      [] Rash on visible skin    [x] Cranial Nerves II-XII grossly intact    [x] Motor grossly intact in visible upper extremities    [x] Motor grossly intact in visible lower extremities    [x] Normal Mood  [] Anxious appearing    [] Depressed appearing  [] Confused appearing      [] Poor short term memory  [] Poor long term memory    [] OTHER:      Due to this being a TeleHealth encounter, evaluation of the following organ systems is limited: Vitals/Constitutional/EENT/Resp/CV/GI//MS/Neuro/Skin/Heme-Lymph-Imm. There were no vitals taken for this visit.      Patient-Reported Vitals 11/14/2022   Patient-Reported Weight 280 lbs   Patient-Reported Height 5 8   Patient-Reported Systolic 899   Patient-Reported Diastolic 90          ASSESSMENT/PLAN:  1. Major depressive disorder, recurrent, mild    - buPROPion (WELLBUTRIN XL) 150 MG extended release tablet; Take 1 tablet by mouth every morning  Dispense: 90 tablet; Refill: 1    2. Tobacco abuse disorder    - buPROPion (WELLBUTRIN XL) 150 MG extended release tablet; Take 1 tablet by mouth every morning  Dispense: 90 tablet; Refill: 1    Return if symptoms worsen or fail to improve. An  electronic signature was used to authenticate this note. --PARVEZ Sahu on 11/14/2022 at 3:32 PM        Pursuant to the emergency declaration under the [facility], [facility] waiver authority and the [employer] Act, this Virtual  Visit was conducted, with patient's consent, to reduce the patient's risk of exposure to COVID-19 and provide continuity of care for an established patient. Services were provided through a video synchronous discussion virtually to substitute for in-person clinic visit.

## 2022-11-15 DIAGNOSIS — I10 ESSENTIAL HYPERTENSION: ICD-10-CM

## 2022-11-15 RX ORDER — METOPROLOL SUCCINATE 25 MG/1
TABLET, EXTENDED RELEASE ORAL
Qty: 90 TABLET | Refills: 0 | Status: SHIPPED | OUTPATIENT
Start: 2022-11-15

## 2022-11-15 NOTE — TELEPHONE ENCOUNTER
Shyanne Doug called to request a refill on his medication.       Last office visit : 11/14/2022   Next office visit : 4/11/2023     Requested Prescriptions     Pending Prescriptions Disp Refills    metoprolol succinate (TOPROL XL) 25 MG extended release tablet [Pharmacy Med Name: METOPROLOL SUCC ER 25 MG TAB] 90 tablet 0     Sig: TAKE ONE TABLET BY MOUTH EVERY DAY            HUSSAIN Fields

## 2023-02-10 DIAGNOSIS — I10 ESSENTIAL HYPERTENSION: ICD-10-CM

## 2023-02-10 RX ORDER — LOSARTAN POTASSIUM 50 MG/1
TABLET ORAL
Qty: 60 TABLET | Refills: 0 | Status: SHIPPED | OUTPATIENT
Start: 2023-02-10

## 2023-02-10 RX ORDER — METOPROLOL SUCCINATE 25 MG/1
TABLET, EXTENDED RELEASE ORAL
Qty: 90 TABLET | Refills: 0 | Status: SHIPPED | OUTPATIENT
Start: 2023-02-10

## 2023-02-10 NOTE — TELEPHONE ENCOUNTER
Pranav Cardenas called to request a refill on his medication.       Last office visit : 11/14/2022   Next office visit : 4/11/2023     Requested Prescriptions     Pending Prescriptions Disp Refills    losartan (COZAAR) 50 MG tablet [Pharmacy Med Name: LOSARTAN POTASSIUM 50 MG TAB] 60 tablet 0     Sig: TAKE ONE TABLET BY MOUTH 2 TIMES A DAY IN THE MORNING AND EVENING    metoprolol succinate (TOPROL XL) 25 MG extended release tablet [Pharmacy Med Name: METOPROLOL SUCC ER 25 MG TAB] 90 tablet 0     Sig: TAKE ONE TABLET BY MOUTH EVERY DAY            Caryl Em LPN

## 2023-03-06 RX ORDER — ALBUTEROL SULFATE 90 UG/1
AEROSOL, METERED RESPIRATORY (INHALATION)
Qty: 8.5 G | Refills: 5 | Status: SHIPPED | OUTPATIENT
Start: 2023-03-06

## 2023-03-08 ENCOUNTER — APPOINTMENT (OUTPATIENT)
Dept: GENERAL RADIOLOGY | Age: 58
End: 2023-03-08
Payer: MEDICARE

## 2023-03-08 ENCOUNTER — OFFICE VISIT (OUTPATIENT)
Dept: PRIMARY CARE CLINIC | Age: 58
End: 2023-03-08
Payer: MEDICARE

## 2023-03-08 ENCOUNTER — HOSPITAL ENCOUNTER (EMERGENCY)
Age: 58
Discharge: HOME | End: 2023-03-08
Payer: MEDICARE

## 2023-03-08 VITALS
BODY MASS INDEX: 43.19 KG/M2 | RESPIRATION RATE: 18 BRPM | HEART RATE: 69 BPM | DIASTOLIC BLOOD PRESSURE: 83 MMHG | HEIGHT: 68 IN | SYSTOLIC BLOOD PRESSURE: 143 MMHG | WEIGHT: 285 LBS | TEMPERATURE: 98.3 F | OXYGEN SATURATION: 97 %

## 2023-03-08 VITALS
BODY MASS INDEX: 43.19 KG/M2 | SYSTOLIC BLOOD PRESSURE: 148 MMHG | HEIGHT: 68 IN | WEIGHT: 285 LBS | TEMPERATURE: 97 F | OXYGEN SATURATION: 96 % | HEART RATE: 70 BPM | DIASTOLIC BLOOD PRESSURE: 86 MMHG

## 2023-03-08 DIAGNOSIS — R07.1 CHEST PAIN ON BREATHING: Primary | ICD-10-CM

## 2023-03-08 DIAGNOSIS — R07.9 CHEST PAIN, UNSPECIFIED TYPE: Primary | ICD-10-CM

## 2023-03-08 LAB
ALBUMIN SERPL-MCNC: 4.2 G/DL (ref 3.5–5.2)
ALP BLD-CCNC: 104 U/L (ref 40–130)
ALT SERPL-CCNC: 16 U/L (ref 5–41)
ANION GAP SERPL CALCULATED.3IONS-SCNC: 10 MMOL/L (ref 7–19)
AST SERPL-CCNC: 16 U/L (ref 5–40)
BASOPHILS ABSOLUTE: 0.2 K/UL (ref 0–0.2)
BASOPHILS RELATIVE PERCENT: 1 % (ref 0–1)
BILIRUB SERPL-MCNC: <0.2 MG/DL (ref 0.2–1.2)
BUN BLDV-MCNC: 22 MG/DL (ref 6–20)
CALCIUM SERPL-MCNC: 10.4 MG/DL (ref 8.6–10)
CHLORIDE BLD-SCNC: 100 MMOL/L (ref 98–111)
CO2: 27 MMOL/L (ref 22–29)
CREAT SERPL-MCNC: 1.1 MG/DL (ref 0.5–1.2)
EOSINOPHILS ABSOLUTE: 1 K/UL (ref 0–0.6)
EOSINOPHILS RELATIVE PERCENT: 6.2 % (ref 0–5)
GFR SERPL CREATININE-BSD FRML MDRD: >60 ML/MIN/{1.73_M2}
GLUCOSE BLD-MCNC: 99 MG/DL (ref 74–109)
HCT VFR BLD CALC: 45.3 % (ref 42–52)
HEMOGLOBIN: 14.7 G/DL (ref 14–18)
IMMATURE GRANULOCYTES #: 0.1 K/UL
LYMPHOCYTES ABSOLUTE: 3.9 K/UL (ref 1.1–4.5)
LYMPHOCYTES RELATIVE PERCENT: 23.5 % (ref 20–40)
MCH RBC QN AUTO: 30.4 PG (ref 27–31)
MCHC RBC AUTO-ENTMCNC: 32.5 G/DL (ref 33–37)
MCV RBC AUTO: 93.6 FL (ref 80–94)
MONOCYTES ABSOLUTE: 1.9 K/UL (ref 0–0.9)
MONOCYTES RELATIVE PERCENT: 11.4 % (ref 0–10)
NEUTROPHILS ABSOLUTE: 9.6 K/UL (ref 1.5–7.5)
NEUTROPHILS RELATIVE PERCENT: 57.4 % (ref 50–65)
PDW BLD-RTO: 13.5 % (ref 11.5–14.5)
PLATELET # BLD: 427 K/UL (ref 130–400)
PMV BLD AUTO: 9.4 FL (ref 9.4–12.4)
POTASSIUM SERPL-SCNC: 4.6 MMOL/L (ref 3.5–5)
PRO-BNP: 81 PG/ML (ref 0–900)
RBC # BLD: 4.84 M/UL (ref 4.7–6.1)
SODIUM BLD-SCNC: 137 MMOL/L (ref 136–145)
TOTAL PROTEIN: 7.6 G/DL (ref 6.6–8.7)
TROPONIN: <0.01 NG/ML (ref 0–0.03)
WBC # BLD: 16.7 K/UL (ref 4.8–10.8)

## 2023-03-08 PROCEDURE — G8482 FLU IMMUNIZE ORDER/ADMIN: HCPCS | Performed by: NURSE PRACTITIONER

## 2023-03-08 PROCEDURE — 3078F DIAST BP <80 MM HG: CPT | Performed by: NURSE PRACTITIONER

## 2023-03-08 PROCEDURE — 84484 ASSAY OF TROPONIN QUANT: CPT

## 2023-03-08 PROCEDURE — 4004F PT TOBACCO SCREEN RCVD TLK: CPT | Performed by: NURSE PRACTITIONER

## 2023-03-08 PROCEDURE — 3017F COLORECTAL CA SCREEN DOC REV: CPT | Performed by: NURSE PRACTITIONER

## 2023-03-08 PROCEDURE — 99285 EMERGENCY DEPT VISIT HI MDM: CPT

## 2023-03-08 PROCEDURE — 83880 ASSAY OF NATRIURETIC PEPTIDE: CPT

## 2023-03-08 PROCEDURE — 93005 ELECTROCARDIOGRAM TRACING: CPT | Performed by: PHYSICIAN ASSISTANT

## 2023-03-08 PROCEDURE — 36415 COLL VENOUS BLD VENIPUNCTURE: CPT

## 2023-03-08 PROCEDURE — 99215 OFFICE O/P EST HI 40 MIN: CPT | Performed by: NURSE PRACTITIONER

## 2023-03-08 PROCEDURE — 80053 COMPREHEN METABOLIC PANEL: CPT

## 2023-03-08 PROCEDURE — G8427 DOCREV CUR MEDS BY ELIG CLIN: HCPCS | Performed by: NURSE PRACTITIONER

## 2023-03-08 PROCEDURE — G8417 CALC BMI ABV UP PARAM F/U: HCPCS | Performed by: NURSE PRACTITIONER

## 2023-03-08 PROCEDURE — 71045 X-RAY EXAM CHEST 1 VIEW: CPT

## 2023-03-08 PROCEDURE — 85025 COMPLETE CBC W/AUTO DIFF WBC: CPT

## 2023-03-08 PROCEDURE — 3074F SYST BP LT 130 MM HG: CPT | Performed by: NURSE PRACTITIONER

## 2023-03-08 SDOH — ECONOMIC STABILITY: INCOME INSECURITY: HOW HARD IS IT FOR YOU TO PAY FOR THE VERY BASICS LIKE FOOD, HOUSING, MEDICAL CARE, AND HEATING?: VERY HARD

## 2023-03-08 SDOH — ECONOMIC STABILITY: HOUSING INSECURITY
IN THE LAST 12 MONTHS, WAS THERE A TIME WHEN YOU DID NOT HAVE A STEADY PLACE TO SLEEP OR SLEPT IN A SHELTER (INCLUDING NOW)?: NO

## 2023-03-08 SDOH — ECONOMIC STABILITY: FOOD INSECURITY: WITHIN THE PAST 12 MONTHS, YOU WORRIED THAT YOUR FOOD WOULD RUN OUT BEFORE YOU GOT MONEY TO BUY MORE.: OFTEN TRUE

## 2023-03-08 SDOH — ECONOMIC STABILITY: FOOD INSECURITY: WITHIN THE PAST 12 MONTHS, THE FOOD YOU BOUGHT JUST DIDN'T LAST AND YOU DIDN'T HAVE MONEY TO GET MORE.: OFTEN TRUE

## 2023-03-08 ASSESSMENT — PATIENT HEALTH QUESTIONNAIRE - PHQ9
2. FEELING DOWN, DEPRESSED OR HOPELESS: 0
10. IF YOU CHECKED OFF ANY PROBLEMS, HOW DIFFICULT HAVE THESE PROBLEMS MADE IT FOR YOU TO DO YOUR WORK, TAKE CARE OF THINGS AT HOME, OR GET ALONG WITH OTHER PEOPLE: 0
SUM OF ALL RESPONSES TO PHQ QUESTIONS 1-9: 0
SUM OF ALL RESPONSES TO PHQ QUESTIONS 1-9: 0
7. TROUBLE CONCENTRATING ON THINGS, SUCH AS READING THE NEWSPAPER OR WATCHING TELEVISION: 0
9. THOUGHTS THAT YOU WOULD BE BETTER OFF DEAD, OR OF HURTING YOURSELF: 0
6. FEELING BAD ABOUT YOURSELF - OR THAT YOU ARE A FAILURE OR HAVE LET YOURSELF OR YOUR FAMILY DOWN: 0
4. FEELING TIRED OR HAVING LITTLE ENERGY: 0
3. TROUBLE FALLING OR STAYING ASLEEP: 0
8. MOVING OR SPEAKING SO SLOWLY THAT OTHER PEOPLE COULD HAVE NOTICED. OR THE OPPOSITE, BEING SO FIGETY OR RESTLESS THAT YOU HAVE BEEN MOVING AROUND A LOT MORE THAN USUAL: 0
5. POOR APPETITE OR OVEREATING: 0
SUM OF ALL RESPONSES TO PHQ QUESTIONS 1-9: 0
SUM OF ALL RESPONSES TO PHQ9 QUESTIONS 1 & 2: 0
SUM OF ALL RESPONSES TO PHQ QUESTIONS 1-9: 0
1. LITTLE INTEREST OR PLEASURE IN DOING THINGS: 0

## 2023-03-08 ASSESSMENT — ENCOUNTER SYMPTOMS
BACK PAIN: 0
SHORTNESS OF BREATH: 1
ABDOMINAL PAIN: 0
RHINORRHEA: 0
BACK PAIN: 1
VOICE CHANGE: 0
NAUSEA: 1
COLOR CHANGE: 0
PHOTOPHOBIA: 0
SHORTNESS OF BREATH: 1
COUGH: 0
VOMITING: 0
NAUSEA: 0
COUGH: 0
VOMITING: 0
DIARRHEA: 0

## 2023-03-08 ASSESSMENT — PAIN DESCRIPTION - LOCATION: LOCATION: SHOULDER

## 2023-03-08 ASSESSMENT — PAIN SCALES - GENERAL: PAINLEVEL_OUTOF10: 2

## 2023-03-08 ASSESSMENT — PAIN DESCRIPTION - DESCRIPTORS: DESCRIPTORS: SHARP

## 2023-03-08 ASSESSMENT — PAIN DESCRIPTION - ORIENTATION: ORIENTATION: LEFT

## 2023-03-08 ASSESSMENT — PAIN - FUNCTIONAL ASSESSMENT: PAIN_FUNCTIONAL_ASSESSMENT: 0-10

## 2023-03-08 NOTE — Clinical Note
Akosua Alvarez was seen and treated in our emergency department on 3/8/2023. He may return to work on 03/11/2023. If you have any questions or concerns, please don't hesitate to call.       Yoly Maciel

## 2023-03-08 NOTE — PROGRESS NOTES
[address] [facility]  [address] [place] [id number] [address] [address]  Dept: [phone]  Dept Fax: [phone]  Loc: [phone]    Kieran Black is a 61 y.o. male who presents today for his medical conditions/complaints as noted below. Kieran Black is c/o of Hypertension (Wants to talk about bp being high. Was 187/100 earlier today)        HPI:     HPI   Chief Complaint   Patient presents with    Hypertension     Wants to talk about bp being high. Was 187/100 earlier today     Patient presents today for evaluation of elevated pressure. He states that blood pressure was 187/100 before he took his medicine. He took Losartan at 2pm and blood pressure is 148/86 in office. He states he went to the dentist 2 days ago and BP was 187/100; at eye doctor last month it was 200/100. He states they never told him it was that high. He has had chest pain occurring sporadically and feels like it has been gas pain but it is in the left side; he had pain to the left shoulder blade on his way in here. Nausea and shortness of breath began today as well. Patient history of PCI stent to OM2 per Dr Nisha Ramirez in July 2020. Ultimately there was some debate if patient would be candidate for bypass surgery and patient left AMA. He had 2 follow-ups with cardiology NP after that, both indicating medication non-compliance. He chose to not take effient/ASA/statin as suggested. He did see cardiologist in [place] after that; no records to view of that, however. The last 2 years as I have seen patient he has not wanted to see cardiology as he felt he was \"fine\". He has been noncompliant often. He was also referred to vascular and did not follow through with vascular testing that was ordered in 2021.     Past Medical History:   Diagnosis Date    Arthritis     Asthma     Bronchitis     CAD (coronary artery disease)     Cerebral artery occlusion with cerebral infarction [facility])     Chronic obstructive pulmonary disease ([facility] [address] 2/3/2021    Emphysema of lung ([facility] [address]     Follicular acne     as stated per pt. \"it comes and goes. Nothing helps it. I've been trying to tell doctors i need an antiobiotic. Hx of blood clots     DVT'S (as stated per pt)    Hypertension     Hypoglycemia     Knee joint pain     Other and unspecified hyperlipidemia     PVD (peripheral vascular disease) ([facility] [address]     Sleep apnea     pt does not use cpap or bipap. Self diagnosed. Pt has not seen a physician about this.     Smoker     x 1 pack daily    Type 2 diabetes mellitus 2/28/2022      Past Surgical History:   Procedure Laterality Date    APPENDECTOMY      CARPAL TUNNEL RELEASE Right     DIAGNOSTIC CARDIAC CATH LAB PROCEDURE      7/2020    HEMORRHOID SURGERY      OTHER SURGICAL HISTORY  11/2015    Femoral stent placement    PAIN MANAGEMENT PROCEDURE N/A 2/11/2020    EPIDURAL STEROID INJECTION L3-4 performed by Deepak Guzman [facility]      PTA/STENT LT FEMORAL ARTERY       Vitals 3/8/2023 3/8/2023 3/8/2023 10/11/2022 9/13/2022 1/23/3469   SYSTOLIC 529 675 [phone]   DIASTOLIC 83 91 86 88 81 69   Site - - - - - -   Position - - - - - -   Pulse 69 73 70 80 84 88   Temp 98.3 98.6 97 97.2 - 96.8   Resp 18 20 - - 18 18   SpO2 97 96 96 97 96 95   Weight - 285 lb 285 lb 281 lb - -   Height - 5' 8\" 5' 8\" 5' 8\" - -   Body mass index - 43.33 kg/m2 43.33 kg/m2 42.72 kg/m2 - -   Pain Level - 2 - - 8 7   Some recent data might be hidden       Family History   Problem Relation Age of Onset    Cancer Father     Heart Disease Father     Heart Surgery Father     Diabetes Father     Hypertension Mother     Hypertension Sister     Heart Disease Sister     Stroke Maternal Grandmother     Heart Disease Maternal Grandmother     Heart Disease Paternal Uncle     Heart Disease Maternal Grandfather     Stroke Maternal Grandfather        Social History     Tobacco Use    Smoking status: Every Day Packs/day: 1.00     Years: 38.00     Pack years: 38.00     Types: Cigarettes     Start date: 11/26/1981    Smokeless tobacco: Never   Substance Use Topics    Alcohol use: No      Current Outpatient Medications on File Prior to Visit   Medication Sig Dispense Refill    albuterol sulfate HFA (PROVENTIL;VENTOLIN;PROAIR) 108 (90 Base) MCG/ACT inhaler INHALE 2 PUFFS BY MOUTH EVERY 6 HOURS AS NEEDED 8.5 g 5    losartan (COZAAR) 50 MG tablet TAKE ONE TABLET BY MOUTH 2 TIMES A DAY IN THE MORNING AND EVENING 60 tablet 0    metoprolol succinate (TOPROL XL) 25 MG extended release tablet TAKE ONE TABLET BY MOUTH EVERY DAY 90 tablet 0    buPROPion (WELLBUTRIN XL) 150 MG extended release tablet Take 1 tablet by mouth every morning 90 tablet 1    azelastine (ASTELIN) 0.1 % nasal spray 2 sprays by Nasal route 2 times daily Use in each nostril as directed 60 mL 5    fluticasone (FLONASE) 50 MCG/ACT nasal spray 1 spray by Each Nostril route daily 32 g 1    albuterol (PROVENTIL) (2.5 MG/3ML) 0.083% nebulizer solution INHALE CONTENTS OF 1 VIAL VIA NEBULIZER FOUR TIMES A DAY AS NEEDED 180 mL 5    metFORMIN (GLUCOPHAGE) 500 MG tablet TAKE ONE TABLET BY MOUTH 2 TIMES A DAY WITH MEALS 180 tablet 0    gabapentin (NEURONTIN) 300 MG capsule Take 300 mg by mouth 4 times daily. TAKE 1 TABLET BY MOUTH 2 TIMES DAILY      predniSONE (DELTASONE) 2.5 MG tablet Take 2.5 mg by mouth daily      hydroxychloroquine (PLAQUENIL) 200 MG tablet       loteprednol (LOTEMAX) 0.5 % ophthalmic suspension Place 2 drops into both eyes daily      phenylephrine (RICK-SYNEPHRINE) 1 % nasal spray 1 drop by Nasal route every 4 hours as needed for Congestion       No current facility-administered medications on file prior to visit.      Allergies   Allergen Reactions    Iodine      Iodine containing multi vitamin    Lisinopril      cough    Shellfish-Derived Products Swelling       Health Maintenance   Topic Date Due    COVID-19 Vaccine (1) Never done    Meningococcal (ACWY) vaccine (1 - Risk start 2-23 months series) Never done    Hib vaccine (1 of 1 - Risk 1-dose series) Never done    Diabetic foot exam  Never done    Meningococcal B vaccine (1 of 4 - Increased Risk Bexsero 2-dose series) Never done    HIV screen  Never done    Diabetic Alb to Cr ratio (uACR) test  Never done    Diabetic retinal exam  Never done    Hepatitis C screen  Never done    Hepatitis B vaccine (1 of 3 - Risk 3-dose series) Never done    DTaP/Tdap/Td vaccine (1 - Tdap) Never done    Shingles vaccine (1 of 2) Never done    Low dose CT lung screening  Never done    A1C test (Diabetic or Prediabetic)  02/03/2022    Annual Wellness Visit (AWV)  04/20/2022    Lipids  02/28/2023    Pneumococcal 0-64 years Vaccine (2 - PPSV23 if available, else PCV20) 10/11/2023    Depression Monitoring  03/08/2024    GFR test (Diabetes, CKD 3-4, OR last GFR 15-59)  03/08/2024    Colorectal Cancer Screen  02/27/2031    Flu vaccine  Completed    Hepatitis A vaccine  Aged Out       Subjective:      Review of Systems   Constitutional:  Negative for chills and fever. HENT:  Negative for ear pain, hearing loss, rhinorrhea and voice change. Eyes:  Negative for photophobia and visual disturbance. Respiratory:  Positive for shortness of breath. Negative for cough. Cardiovascular:  Positive for chest pain. Negative for palpitations. Gastrointestinal:  Positive for nausea. Negative for vomiting. Endocrine: Negative. Negative for cold intolerance and heat intolerance. Genitourinary:  Negative for difficulty urinating and flank pain. Musculoskeletal:  Negative for back pain and neck pain. Skin:  Negative for color change and rash. Allergic/Immunologic: Negative for environmental allergies and food allergies. Neurological:  Negative for dizziness, speech difficulty and headaches. Hematological:  Does not bruise/bleed easily. Psychiatric/Behavioral:  Negative for sleep disturbance and suicidal ideas. Objective:     Physical Exam  Vitals and nursing note reviewed. Constitutional:       Appearance: He is well-developed. HENT:      Head: Atraumatic. Right Ear: External ear normal.      Left Ear: External ear normal.      Nose: Nose normal.   Eyes:      Conjunctiva/sclera: Conjunctivae normal.      Pupils: Pupils are equal, round, and reactive to light. Cardiovascular:      Rate and Rhythm: Normal rate and regular rhythm. Heart sounds: Normal heart sounds, S1 normal and S2 normal.   Pulmonary:      Effort: Pulmonary effort is normal.      Breath sounds: Normal breath sounds. Abdominal:      General: Bowel sounds are normal.      Palpations: Abdomen is soft. Musculoskeletal:         General: Normal range of motion. Cervical back: Normal range of motion and neck supple. Skin:     General: Skin is warm and dry. Neurological:      Mental Status: He is alert and oriented to person, place, and time. Psychiatric:         Behavior: Behavior normal.     BP (!) 148/86   Pulse 70   Temp 97 °F (36.1 °C) (Temporal)   Ht 5' 8\" (1.727 m)   Wt 285 lb (129.3 kg)   SpO2 96%   BMI 43.33 kg/m²     Assessment:       Diagnosis Orders   1. Chest pain on breathing              Plan:   More than 50% of the time was spent counseling and coordinating care for a total time of 40 min face to face. Given symptoms and history, I prefer patient have full cardiac work-up in the ED today. He is agreeable to going, but declines EMS. Report given to Marily Colindres at [facility] ED. PDMP Monitoring:    Last PDMP Carter as Reviewed:  Review User Review Instant Review Result            Urine Drug Screenings (1 yr)    No resulted procedures found. Medication Contract and Consent for Opioid Use Documents Filed        No documents found                     Patient given educational materials -see patient instructions. Discussed use, benefit, and side effects of prescribed medications.   All patient questions answered. Pt voiced understanding. Reviewed health maintenance. Instructed to continue currentmedications, diet and exercise. Patient agreed with treatment plan. Follow up as directed. MEDICATIONS:  No orders of the defined types were placed in this encounter. ORDERS:  No orders of the defined types were placed in this encounter. Follow-up:  No follow-ups on file. PATIENT INSTRUCTIONS:  There are no Patient Instructions on file for this visit. Electronically signed by PARVEZ Gaspar on 3/14/2023 at 2:28 PM    EMR Dragon/transcription disclaimer:  Much of thisencounter note is electronic transcription/translation of spoken language to printed texts. The electronic translation of spoken language may be erroneous, or at times, nonsensical words or phrases may be inadvertentlytranscribed.   Although I have reviewed the note for such errors, some may still exist.

## 2023-03-09 LAB
EKG P AXIS: 3 DEGREES
EKG P-R INTERVAL: 170 MS
EKG Q-T INTERVAL: 416 MS
EKG QRS DURATION: 128 MS
EKG QTC CALCULATION (BAZETT): 430 MS
EKG T AXIS: 51 DEGREES

## 2023-03-09 NOTE — ED PROVIDER NOTES
[facility] EMERGENCY DEPT  eMERGENCY dEPARTMENT eNCOUnter      Pt Name: Brenda Le  MRN: [id number]  [place] 1965  Date of evaluation: 3/8/2023  Provider: Eze Tello Dr       Chief Complaint   Patient presents with    Chest Pain     Pt arrived to the ed with c/o chest pain. Onset today. Pt sent by PCP. Pt has hx of stent and was supposed to have bypass per Dr. Manju Jamison, but pt didn't. HISTORY OF PRESENT ILLNESS   (Location/Symptom, Timing/Onset,Context/Setting, Quality, Duration, Modifying Factors, Severity)  Note limiting factors. Brenda Le is a 61 y.o. male with history including hypertension, sleep apnea, COPD, blood clots, type 2 diabetes, obesity, and coronary artery disease who presents to the emergency department with complaint of atypical chest pain. Patient notes pain behind the left shoulder blade as well as slight increase in shortness of breath that began today. He states that he does also note some indigestion and increased belching since this morning. He denies any associated leg swelling. He also denies any anterior chest pain. He denies headache or dizziness. He is not on any blood thinning medication including aspirin. He states he used to see Dr. Manju Jamison who recommended bypass after abnormal heart cath but he did not feel that it would be necessary. According to the patient's chart, last cath was 7/8/2020 and last stress test was 10/9/2019. His previous EKG did show sinus rhythm with right bundle. NursingNotes were reviewed. REVIEW OF SYSTEMS    (2-9 systems for level 4, 10 or more for level 5)     Review of Systems   Constitutional:  Positive for fatigue. Negative for chills and fever. HENT:  Negative for congestion. Respiratory:  Positive for shortness of breath. Negative for cough. Cardiovascular:  Positive for chest pain. Gastrointestinal:  Negative for abdominal pain, diarrhea, nausea and vomiting.         Increased belching and indigestion   Musculoskeletal:  Positive for back pain. Negative for myalgias. Neurological:  Negative for dizziness, weakness and headaches. All other systems reviewed and are negative. PAST MEDICALHISTORY     Past Medical History:   Diagnosis Date    Arthritis     Asthma     Bronchitis     CAD (coronary artery disease)     Cerebral artery occlusion with cerebral infarction [facility])     Chronic obstructive pulmonary disease ([facility] [address] 2/3/2021    Emphysema of lung (HCC)     Follicular acne     as stated per pt. \"it comes and goes. Nothing helps it. I've been trying to tell doctors i need an antiobiotic. Hx of blood clots     DVT'S (as stated per pt)    Hypertension     Hypoglycemia     Knee joint pain     Other and unspecified hyperlipidemia     PVD (peripheral vascular disease) ([facility] [address]     Sleep apnea     pt does not use cpap or bipap. Self diagnosed. Pt has not seen a physician about this.     Smoker     x 1 pack daily    Type 2 diabetes mellitus 2/28/2022         SURGICAL HISTORY       Past Surgical History:   Procedure Laterality Date    APPENDECTOMY      CARPAL TUNNEL RELEASE Right     DIAGNOSTIC CARDIAC CATH LAB PROCEDURE      7/2020    HEMORRHOID SURGERY      OTHER SURGICAL HISTORY  11/2015    Femoral stent placement    PAIN MANAGEMENT PROCEDURE N/A 2/11/2020    EPIDURAL STEROID INJECTION L3-4 performed by Deepak Guzman [facility] 82      PTA/STENT LT FEMORAL ARTERY         CURRENT MEDICATIONS     Discharge Medication List as of 3/8/2023  6:22 PM        CONTINUE these medications which have NOT CHANGED    Details   albuterol sulfate HFA (PROVENTIL;VENTOLIN;PROAIR) 108 (90 Base) MCG/ACT inhaler INHALE 2 PUFFS BY MOUTH EVERY 6 HOURS AS NEEDED, Disp-8.5 g, R-5Normal      losartan (COZAAR) 50 MG tablet TAKE ONE TABLET BY MOUTH 2 TIMES A DAY IN THE MORNING AND EVENING, Disp-60 tablet, R-0Normal      metoprolol succinate (TOPROL XL) 25 MG extended release tablet TAKE ONE TABLET BY MOUTH EVERY DAY, Disp-90 tablet, R-0Normal      buPROPion (WELLBUTRIN XL) 150 MG extended release tablet Take 1 tablet by mouth every morning, Disp-90 tablet, R-1Normal      azelastine (ASTELIN) 0.1 % nasal spray 2 sprays by Nasal route 2 times daily Use in each nostril as directed, Disp-60 mL, R-5Normal      fluticasone (FLONASE) 50 MCG/ACT nasal spray 1 spray by Each Nostril route daily, Disp-32 g, R-1Normal      albuterol (PROVENTIL) (2.5 MG/3ML) 0.083% nebulizer solution INHALE CONTENTS OF 1 VIAL VIA NEBULIZER FOUR TIMES A DAY AS NEEDED, Disp-180 mL, R-5Normal      metFORMIN (GLUCOPHAGE) 500 MG tablet TAKE ONE TABLET BY MOUTH 2 TIMES A DAY WITH MEALS, Disp-180 tablet, R-0Normal      gabapentin (NEURONTIN) 300 MG capsule Take 300 mg by mouth 4 times daily. TAKE 1 TABLET BY MOUTH 2 TIMES DAILYHistorical Med      predniSONE (DELTASONE) 2.5 MG tablet Take 2.5 mg by mouth dailyHistorical Med      hydroxychloroquine (PLAQUENIL) 200 MG tablet Historical Med      loteprednol (LOTEMAX) 0.5 % ophthalmic suspension Place 2 drops into both eyes dailyHistorical Med      phenylephrine (RICK-SYNEPHRINE) 1 % nasal spray 1 drop by Nasal route every 4 hours as needed for CongestionHistorical Med             ALLERGIES     Iodine, Lisinopril, and Shellfish-derived products    FAMILY HISTORY       Family History   Problem Relation Age of Onset    Cancer Father     Heart Disease Father     Heart Surgery Father     Diabetes Father     Hypertension Mother     Hypertension Sister     Heart Disease Sister     Stroke Maternal Grandmother     Heart Disease Maternal Grandmother     Heart Disease Paternal Uncle     Heart Disease Maternal Grandfather     Stroke Maternal Grandfather           SOCIAL HISTORY       Social History     Socioeconomic History    Marital status:       Spouse name: None    Number of children: None    Years of education: None    Highest education level: None   Tobacco Use Smoking status: Every Day     Packs/day: 1.00     Years: 38.00     Pack years: 38.00     Types: Cigarettes     Start date: 11/26/1981    Smokeless tobacco: Never   Vaping Use    Vaping Use: Never used   Substance and Sexual Activity    Alcohol use: No    Drug use: Not Currently     Social Determinants of Health     Financial Resource Strain: High Risk    Difficulty of Paying Living Expenses: Very hard   Food Insecurity: Food Insecurity Present    Worried About [address] [employer] in the Last Year: Often true    Ran Out of Food in the Last Year: Often true   Transportation Needs: Unknown    Lack of Transportation (Non-Medical): No   Housing Stability: Unknown    Unstable Housing in the Last Year: No       SCREENINGS    [place] Coma Scale  Eye Opening: Spontaneous  Best Verbal Response: Oriented  Best Motor Response: Obeys commands  [place] Coma Scale Score: 15        PHYSICAL EXAM    (up to 7 for level 4, 8 or more for level 5)     ED Triage Vitals [03/08/23 1650]   BP Temp Temp Source Heart Rate Resp SpO2 Height Weight   (!) 178/91 98.6 °F (37 °C) Oral 73 20 96 % 5' 8\" (1.727 m) 285 lb (129.3 kg)       Physical Exam  Vitals and nursing note reviewed. Constitutional:       General: He is not in acute distress. Appearance: Normal appearance. He is obese. He is ill-appearing. He is not toxic-appearing or diaphoretic. HENT:      Head: Normocephalic and atraumatic. Eyes:      Extraocular Movements: Extraocular movements intact. Pupils: Pupils are equal, round, and reactive to light. Cardiovascular:      Rate and Rhythm: Normal rate and regular rhythm. Pulses: Normal pulses. Heart sounds: Normal heart sounds. Pulmonary:      Effort: Pulmonary effort is normal. No respiratory distress. Breath sounds: Normal breath sounds. Comments: Nonlabored breathing at rest or walking around the room.   He did have some labored breathing with exerting himself walking from waiting room to his room  Chest:      Chest wall: No tenderness. Abdominal:      General: There is no distension. Palpations: Abdomen is soft. Tenderness: There is no abdominal tenderness. Musculoskeletal:      Cervical back: Normal range of motion and neck supple. No rigidity or tenderness. Right lower leg: No edema. Left lower leg: No edema. Lymphadenopathy:      Cervical: No cervical adenopathy. Skin:     General: Skin is warm and dry. Neurological:      General: No focal deficit present. Mental Status: He is alert and oriented to person, place, and time. DIAGNOSTIC RESULTS     EKG: All EKG's areinterpreted by the Emergency Department Physician who either signs or Co-signs this chart in the absence of a cardiologist.    EKG interpreted by attending, normal sinus rhythm at a rate of 68, no STEMI or acute ischemia, , QTc 440, right bundle branch block which was present on previous EKG    RADIOLOGY:  Non-plain film images such as CT, Ultrasound and MRI are read by the radiologist. Plain radiographic images are visualized and preliminarily interpreted bythe emergency physician with the below findings:    XR CHEST PORTABLE   Final Result   No radiographic evidence of acute cardiopulmonary process. LABS:  Labs Reviewed   COMPREHENSIVE METABOLIC PANEL - Abnormal; Notable for the following components:       Result Value    BUN 22 (*)     Calcium 10.4 (*)     All other components within normal limits   CBC WITH AUTO DIFFERENTIAL - Abnormal; Notable for the following components:    WBC 16.7 (*)     MCHC 32.5 (*)     Platelets 682 (*)     Monocytes % 11.4 (*)     Eosinophils % 6.2 (*)     Neutrophils Absolute 9.6 (*)     Monocytes Absolute 1.90 (*)     Eosinophils Absolute 1.00 (*)     All other components within normal limits   COVID-19, RAPID   TROPONIN   BRAIN NATRIURETIC PEPTIDE       All other labs were within normal range or not returned as of this dictation.     EMERGENCY DEPARTMENT COURSE and DIFFERENTIAL DIAGNOSIS/MDM:   Vitals:    Vitals:    03/08/23 1650 03/08/23 1815   BP: (!) 178/91 (!) 143/83   Pulse: 73 69   Resp: 20 18   Temp: 98.6 °F (37 °C) 98.3 °F (36.8 °C)   TempSrc: Oral    SpO2: 96% 97%   Weight: 285 lb (129.3 kg)    Height: 5' 8\" (1.727 m)        MDM  Patient is a 44-year-old male presents the ER with complaint of chest pain and shortness of breath. Vitals on arrival did show some hypertension which improved in the ER without intervention. He has no troponin elevation and it has been going on for over 6 hours. No signs of CHF exacerbation with negative BNP. He does have some leukocytosis on his CBC without anemia. CMP was negative for electro disturbance, KEVEN, or LFT elevation. Chest x-ray negative for acute cardiopulmonary process including pneumonia or other consolidation. EKG does not show STEMI or acute ischemia. The patient has a heart score of 5. I have discussed with him that I encouraged him to be admitted. I stressed that he could be further worked up including for stress test, heart cath, and even for further blood clot if needed. We discussed that we could not rule everything out in this 1 ER visit. He verbalized understanding of the risk associated for leaving and still would prefer to be discharged. He was stressed that he needed to call cardiology and primary care first thing tomorrow for further evaluation outpatient. I also went over very strict return precautions with him and he verbalized understanding. All his questions were answered. He states he has been gradually feeling worse over the last few months and thinks that this is a chronic issue. He states he only came because one of his family members was pressuring him with the pain behind his shoulder blade. I went over atypical chest pain symptoms and he verbalized understanding. He is competent to make this decision at this time. FINAL IMPRESSION      1.  Chest pain, unspecified type DISPOSITION/PLAN   DISPOSITION Decision To Discharge 03/08/2023 06:15:30 PM      PATIENT REFERRED TO:  Sukhi Gonzales MD  [address] [facility]  [phone]            DISCHARGE MEDICATIONS:  Discharge Medication List as of 3/8/2023  6:22 PM             (Please note that portions of this note were completed with a voice recognition program.  Efforts were made to edit thedictations but occasionally words are mis-transcribed.)    BETO Fernandes (electronically signed)     Debbie Fernandesma  03/08/23 0661

## 2023-03-15 RX ORDER — LOSARTAN POTASSIUM 50 MG/1
TABLET ORAL
Qty: 60 TABLET | Refills: 0 | Status: SHIPPED | OUTPATIENT
Start: 2023-03-15

## 2023-03-15 NOTE — TELEPHONE ENCOUNTER
Renata Days called to request a refill on his medication. Last office visit : 3/8/2023   Next office visit : 4/11/2023     Requested Prescriptions     Pending Prescriptions Disp Refills    losartan (COZAAR) 50 MG tablet [Pharmacy Med Name: LOSARTAN POTASSIUM 50 MG TAB] 60 tablet 0     Sig: TAKE ONE TABLET BY MOUTH 2 TIMES A DAY IN THE MORNING AND [address] [facility] Renata Days called to request a refill on his medication.       Last office visit : 3/8/2023   Next office visit : 4/11/2023     Requested Prescriptions     Pending Prescriptions Disp Refills    losartan (COZAAR) 50 MG tablet [Pharmacy Med Name: LOSARTAN POTASSIUM 50 MG TAB] 60 tablet 0     Sig: TAKE ONE TABLET BY MOUTH 2 TIMES A DAY IN THE MORNING AND EVENING            Alice Quinones LPN

## 2023-04-10 NOTE — PROGRESS NOTES
[address] [facility]  [address] [place] [address]  [address] [address]  Dept: [phone]  Dept Fax: [phone]  Loc: [phone]    Tiffany Farias is a 61 y.o. male who presents today for his medical conditions/complaints as noted below. Tiffany Farias is c/o of Other (Pt states he has lumps under right armpit)      HPI:     HPI   Chief Complaint   Patient presents with    Other     Pt states he has lumps under right armpit     Patient presents today for follow-up hypertension, COPD, CAD, PVD. Patient is due for fasting labs. Patient recently had vascular consultation; NARCISO/JOSE's ordered- scheduled for ?? Appears patient did not show up to previously scheduled appointments. I discussed he needed these studies done to check blood flow in his legs and he states \"well they aren't going to get to do them\"  It was again recommended use of ASA and statin; he does not tolerate statins he states. Patient continues to see pain management. He sees Dr Sonny Chand; states he has an injection scheduled for tomorrow morning. He has also seen neurosurgeon at [facility] due chronic low back pain. He states \"they were no help; they said there was nothing there they could help\". He  reports that he has been smoking. He started smoking about 39 years ago. He has a 38.00 pack-year smoking history. He has never used smokeless tobacco.     Patient states he is fully vaccinated for covid19; does not have card today. Past Medical History:   Diagnosis Date    Arthritis     Asthma     Bronchitis     CAD (coronary artery disease)     Cerebral artery occlusion with cerebral infarction (HCC)     Chronic obstructive pulmonary disease ([facility] [address] 2/3/2021    Emphysema of lung (HCC)     Follicular acne     as stated per pt. \"it comes and goes. Nothing helps it. I've been trying to tell doctors i need an antiobiotic.     Hx of blood clots     DVT'S (as stated per pt)    Hypertension     Refill    gabapentin (NEURONTIN) 300 MG capsule Take 300 mg by mouth 2 times daily. TAKE 1 TABLET BY MOUTH 2 TIMES DAILY      levETIRAcetam (KEPPRA) 250 MG tablet Take 250 mg by mouth 2 times daily      metoprolol succinate (TOPROL XL) 25 MG extended release tablet TAKE ONE TABLET BY MOUTH EVERY DAY 90 tablet 3    metFORMIN (GLUCOPHAGE) 500 MG tablet Take 500 mg by mouth 2 times daily (with meals)      predniSONE (DELTASONE) 2.5 MG tablet Take 2.5 mg by mouth daily      losartan (COZAAR) 50 MG tablet TAKE ONE TABLET BY MOUTH 2 TIMES A DAY IN THE MORNING AND EVENING 180 tablet 0    hydroxychloroquine (PLAQUENIL) 200 MG tablet       albuterol sulfate  (90 Base) MCG/ACT inhaler INHALE 2 PUFFS BY MOUTH EVERY 6 HOURS AS NEEDED 8.5 g 5    albuterol (PROVENTIL) (2.5 MG/3ML) 0.083% nebulizer solution Take 3 mLs by nebulization 4 times daily 120 each 3    loteprednol (LOTEMAX) 0.5 % ophthalmic suspension Place 2 drops into both eyes daily      phenylephrine (RICK-SYNEPHRINE) 1 % nasal spray 1 drop by Nasal route every 4 hours as needed for Congestion      sulfaSALAzine (AZULFIDINE) 500 MG tablet  (Patient not taking: Reported on 7/28/2021)       No current facility-administered medications on file prior to visit.      Allergies   Allergen Reactions    Iodine      Iodine containing multi vitamin    Lisinopril      cough    Shellfish-Derived Products Swelling       Health Maintenance   Topic Date Due    COVID-19 Vaccine (1) Never done    Low dose CT lung screening  Never done    Meningococcal (ACWY) vaccine (1 - Risk start before 7 months 4-dose series) 08/23/2021 (Originally 1965)    Shingles Vaccine (1 of 2) 02/03/2022 (Originally 5/25/2014)    Pneumococcal 0-64 years Vaccine (1 of 4 - PCV13) 02/03/2022 (Originally 5/25/1970)    Hib vaccine (1 of 1 - Risk 1-dose series) 08/23/2022 (Originally 8/25/1966)    DTaP/Tdap/Td vaccine (1 - Tdap) 08/23/2022 (Originally 5/25/1983)    Meningococcal B vaccine (1 of 4 - Increased Risk Bexsero 2-dose series) 08/23/2022 (Originally 5/25/1974)    Hepatitis C screen  08/23/2022 (Originally 1965)    HIV screen  08/23/2022 (Originally 5/25/1979)    Flu vaccine (1) 09/01/2021    Potassium monitoring  10/07/2021    Creatinine monitoring  10/07/2021    Annual Wellness Visit (AWV)  04/20/2022    Diabetes screen  02/03/2024    Lipid screen  08/04/2025    Colon cancer screen colonoscopy  02/27/2031    Hepatitis A vaccine  Aged Out    Hepatitis B vaccine  Aged Out       Subjective:      Review of Systems   Constitutional: Negative for chills and fever. HENT: Negative for ear pain, hearing loss, rhinorrhea and voice change. Eyes: Negative for photophobia and visual disturbance. Respiratory: Negative for cough and shortness of breath. Cardiovascular: Negative for chest pain and palpitations. Gastrointestinal: Negative for nausea and vomiting. Endocrine: Negative. Negative for cold intolerance and heat intolerance. Genitourinary: Negative for difficulty urinating and flank pain. Musculoskeletal: Negative for back pain and neck pain. Skin: Negative for color change and rash. Allergic/Immunologic: Negative for environmental allergies and food allergies. Neurological: Negative for dizziness, speech difficulty and headaches. Hematological: Does not bruise/bleed easily. Psychiatric/Behavioral: Negative for sleep disturbance and suicidal ideas. Objective:     Physical Exam  Vitals and nursing note reviewed. Constitutional:       Appearance: He is well-developed. HENT:      Head: Atraumatic. Right Ear: External ear normal.      Left Ear: External ear normal.      Nose: Nose normal.   Eyes:      Conjunctiva/sclera: Conjunctivae normal.      Pupils: Pupils are equal, round, and reactive to light. Cardiovascular:      Rate and Rhythm: Normal rate and regular rhythm.       Heart sounds: Normal heart sounds, S1 normal and S2 normal.   Pulmonary:      Effort: Pulmonary effort is normal.      Breath sounds: Normal breath sounds. Abdominal:      General: Bowel sounds are normal.      Palpations: Abdomen is soft. Musculoskeletal:         General: Normal range of motion. Cervical back: Normal range of motion and neck supple. Skin:     General: Skin is warm and dry. Neurological:      Mental Status: He is alert and oriented to person, place, and time. Psychiatric:         Behavior: Behavior normal.       Resp 18   Ht 5' 8\" (1.727 m)   Wt 273 lb (123.8 kg)   BMI 41.51 kg/m²     Assessment:       Diagnosis Orders   1. Essential hypertension     2. Coronary artery disease of native artery of native heart with stable angina pectoris ([address]     3. Tobacco abuse disorder     4. Class 3 severe obesity with body mass index (BMI) of 40.0 to 44.9 in adult, unspecified obesity type, unspecified whether serious comorbidity present ([facility] [address]           Plan:   More than 50% of the time was spent counseling and coordinating care for a total time of 30 min face to face. Requested that patient obtain fasting labs. We will see him every 6 months or as needed. Approximately 5 minutes of education was provided about quit smoking and the harms of tobacco.  Patient does show understanding. Patient does not have  the desire to quit smoking in the future. Patient given educational materials -see patient instructions. Discussed use, benefit, and side effects of prescribed medications. All patient questions answered. Pt voiced understanding. Reviewed health maintenance. Instructed to continue currentmedications, diet and exercise. Patient agreed with treatment plan. Follow up as directed. MEDICATIONS:  No orders of the defined types were placed in this encounter. ORDERS:  No orders of the defined types were placed in this encounter. Follow-up:  No follow-ups on file.     PATIENT INSTRUCTIONS:  There are no Patient Instructions on file for this visit. Electronically signed by PARVEZ Ashyb on 8/23/2021 at 12:50 PM    EMR Dragon/transcription disclaimer:  Much of thisencounter note is electronic transcription/translation of spoken language to printed texts. The electronic translation of spoken language may be erroneous, or at times, nonsensical words or phrases may be inadvertentlytranscribed.   Although I have reviewed the note for such errors, some may still exist. negative

## 2023-04-18 RX ORDER — LOSARTAN POTASSIUM 50 MG/1
TABLET ORAL
Qty: 60 TABLET | Refills: 0 | Status: SHIPPED | OUTPATIENT
Start: 2023-04-18

## 2023-04-24 ENCOUNTER — TELEPHONE (OUTPATIENT)
Dept: PRIMARY CARE CLINIC | Age: 58
End: 2023-04-24

## 2023-04-24 NOTE — TELEPHONE ENCOUNTER
----- Message from Myriam Baeza sent at 4/24/2023  1:13 PM CDT -----  Subject: Message to Provider    QUESTIONS  Information for Provider? Patient indicates he needed to cancel his appt   tomorrow because he's having issues with insurance coverage. He's saying   his insurance is not paying and doesn't want to run up a big bill without   understanding coverage better. Can you please advise on any issues you   see? Patient indicated he will be calling [employer] next. Also patient   advises that BP is generally running about 146ish/82ish; he's been   monitoring it daily.   ---------------------------------------------------------------------------  --------------  Cele BERNSTEIN  [phone]; OK to leave message on voicemail  ---------------------------------------------------------------------------  --------------  SCRIPT ANSWERS  Relationship to Patient?  Self

## 2023-04-25 NOTE — TELEPHONE ENCOUNTER
I do not handle billing/insurance questions. But it sounds like he was going to be calling his insurance company to determine the issue.

## 2023-05-08 DIAGNOSIS — I10 ESSENTIAL HYPERTENSION: ICD-10-CM

## 2023-05-08 NOTE — TELEPHONE ENCOUNTER
Kanchan Guy called to request a refill on his medication. Spoke with patient regarding an appointment. His insurance isn't paying his bills so he wouldn't make an appointment.       Last office visit : 4/11/2023   Next office visit : Visit date not found     Requested Prescriptions     Pending Prescriptions Disp Refills    metoprolol succinate (TOPROL XL) 25 MG extended release tablet [Pharmacy Med Name: METOPROLOL SUCC ER 25 MG TAB] 90 tablet 3     Sig: TAKE ONE TABLET BY MOUTH EVERY DAY            Michael Pack LPN

## 2023-05-10 RX ORDER — METOPROLOL SUCCINATE 25 MG/1
TABLET, EXTENDED RELEASE ORAL
Qty: 90 TABLET | Refills: 3 | Status: SHIPPED | OUTPATIENT
Start: 2023-05-10

## 2023-05-11 NOTE — PLAN OF CARE
Problem: Falls - Risk of:  Goal: Will remain free from falls  Description: Will remain free from falls  Outcome: Ongoing  Goal: Absence of physical injury  Description: Absence of physical injury  Outcome: Ongoing
within normal limits

## 2023-05-19 RX ORDER — LOSARTAN POTASSIUM 50 MG/1
TABLET ORAL
Qty: 60 TABLET | Refills: 0 | Status: SHIPPED | OUTPATIENT
Start: 2023-05-19

## 2023-05-19 NOTE — TELEPHONE ENCOUNTER
Pattie Tyler called to request a refill on his medication. Spoke with patient regarding an appointment. He is having problems with his insurance paying our office as his insurance says we are out of network. And he can't afford to continue to run up a bill. He states his Bp with the Losartan is helping his BP.     Last office visit : 4/11/2023   Next office visit : Visit date not found     Requested Prescriptions     Pending Prescriptions Disp Refills    losartan (COZAAR) 50 MG tablet [Pharmacy Med Name: LOSARTAN POTASSIUM 50 MG TAB] 60 tablet 0     Sig: TAKE ONE TABLET BY MOUTH 2 TIMES A DAY IN THE MORNING AND EVENING            Cristin Hills LPN

## 2023-06-20 DIAGNOSIS — Z72.0 TOBACCO ABUSE DISORDER: ICD-10-CM

## 2023-06-20 DIAGNOSIS — F33.0 MAJOR DEPRESSIVE DISORDER, RECURRENT, MILD (HCC): ICD-10-CM

## 2023-06-20 RX ORDER — LOSARTAN POTASSIUM 50 MG/1
TABLET ORAL
Qty: 60 TABLET | Refills: 0 | Status: SHIPPED | OUTPATIENT
Start: 2023-06-20

## 2023-06-20 RX ORDER — BUPROPION HYDROCHLORIDE 150 MG/1
TABLET ORAL
Qty: 90 TABLET | Refills: 0 | Status: SHIPPED | OUTPATIENT
Start: 2023-06-20

## 2023-07-20 RX ORDER — LOSARTAN POTASSIUM 50 MG/1
TABLET ORAL
Qty: 60 TABLET | Refills: 0 | Status: SHIPPED | OUTPATIENT
Start: 2023-07-20

## 2023-07-20 NOTE — TELEPHONE ENCOUNTER
Maria Fernanda Branham called requesting a refill of the below medication which has been pended for you:     Requested Prescriptions     Pending Prescriptions Disp Refills    losartan (COZAAR) 50 MG tablet [Pharmacy Med Name: LOSARTAN POTASSIUM 50 MG TAB] 60 tablet 0     Sig: TAKE ONE TABLET BY MOUTH 2 TIMES A DAY IN THE MORNING AND EVENING       Last Appointment Date: 4/11/2023  Next Appointment Date: 8/7/2023    Allergies   Allergen Reactions    Iodine      Iodine containing multi vitamin    Lisinopril      cough    Shellfish-Derived Products Swelling

## 2023-08-07 ENCOUNTER — OFFICE VISIT (OUTPATIENT)
Dept: PRIMARY CARE CLINIC | Age: 58
End: 2023-08-07
Payer: MEDICARE

## 2023-08-07 VITALS
TEMPERATURE: 96.9 F | OXYGEN SATURATION: 97 % | BODY MASS INDEX: 41.89 KG/M2 | HEART RATE: 75 BPM | HEIGHT: 68 IN | DIASTOLIC BLOOD PRESSURE: 68 MMHG | WEIGHT: 276.4 LBS | SYSTOLIC BLOOD PRESSURE: 126 MMHG

## 2023-08-07 DIAGNOSIS — R13.10 DYSPHAGIA, UNSPECIFIED TYPE: ICD-10-CM

## 2023-08-07 DIAGNOSIS — K21.9 GASTROESOPHAGEAL REFLUX DISEASE WITHOUT ESOPHAGITIS: ICD-10-CM

## 2023-08-07 DIAGNOSIS — G62.9 NEUROPATHY: ICD-10-CM

## 2023-08-07 DIAGNOSIS — I10 ESSENTIAL HYPERTENSION: Primary | ICD-10-CM

## 2023-08-07 DIAGNOSIS — M48.00 SPINAL STENOSIS, UNSPECIFIED SPINAL REGION: ICD-10-CM

## 2023-08-07 DIAGNOSIS — Z72.0 TOBACCO ABUSE DISORDER: ICD-10-CM

## 2023-08-07 DIAGNOSIS — F33.0 MAJOR DEPRESSIVE DISORDER, RECURRENT, MILD (HCC): ICD-10-CM

## 2023-08-07 DIAGNOSIS — I10 ESSENTIAL HYPERTENSION: ICD-10-CM

## 2023-08-07 DIAGNOSIS — R09.81 CHRONIC NASAL CONGESTION: ICD-10-CM

## 2023-08-07 DIAGNOSIS — E11.9 TYPE 2 DIABETES MELLITUS WITHOUT COMPLICATION, WITHOUT LONG-TERM CURRENT USE OF INSULIN (HCC): ICD-10-CM

## 2023-08-07 LAB
ALBUMIN SERPL-MCNC: 4.4 G/DL (ref 3.5–5.2)
ALP SERPL-CCNC: 110 U/L (ref 40–130)
ALT SERPL-CCNC: 25 U/L (ref 5–41)
ANION GAP SERPL CALCULATED.3IONS-SCNC: 13 MMOL/L (ref 7–19)
AST SERPL-CCNC: 18 U/L (ref 5–40)
BASOPHILS # BLD: 0 K/UL (ref 0–0.2)
BASOPHILS NFR BLD: 0 % (ref 0–1)
BILIRUB SERPL-MCNC: <0.2 MG/DL (ref 0.2–1.2)
BUN SERPL-MCNC: 24 MG/DL (ref 6–20)
CALCIUM SERPL-MCNC: 10.1 MG/DL (ref 8.6–10)
CHLORIDE SERPL-SCNC: 103 MMOL/L (ref 98–111)
CHOLEST SERPL-MCNC: 246 MG/DL (ref 160–199)
CO2 SERPL-SCNC: 21 MMOL/L (ref 22–29)
CREAT SERPL-MCNC: 1.2 MG/DL (ref 0.5–1.2)
EOSINOPHIL # BLD: 0.66 K/UL (ref 0–0.6)
EOSINOPHIL NFR BLD: 4 % (ref 0–5)
ERYTHROCYTE [DISTWIDTH] IN BLOOD BY AUTOMATED COUNT: 13.4 % (ref 11.5–14.5)
GLUCOSE SERPL-MCNC: 82 MG/DL (ref 74–109)
HBA1C MFR BLD: 6 % (ref 4–6)
HCT VFR BLD AUTO: 42.6 % (ref 42–52)
HDLC SERPL-MCNC: 28 MG/DL (ref 55–121)
HGB BLD-MCNC: 14.1 G/DL (ref 14–18)
IMM GRANULOCYTES # BLD: 0.1 K/UL
LDLC SERPL CALC-MCNC: 144 MG/DL
LYMPHOCYTES # BLD: 3.1 K/UL (ref 1.1–4.5)
LYMPHOCYTES NFR BLD: 19 % (ref 20–40)
MCH RBC QN AUTO: 30.9 PG (ref 27–31)
MCHC RBC AUTO-ENTMCNC: 33.1 G/DL (ref 33–37)
MCV RBC AUTO: 93.4 FL (ref 80–94)
MONOCYTES # BLD: 2.1 K/UL (ref 0–0.9)
MONOCYTES NFR BLD: 13 % (ref 0–10)
NEUTROPHILS # BLD: 10.5 K/UL (ref 1.5–7.5)
NEUTS SEG NFR BLD: 64 % (ref 50–65)
PLATELET # BLD AUTO: 465 K/UL (ref 130–400)
PLATELET SLIDE REVIEW: ABNORMAL
PMV BLD AUTO: 10.2 FL (ref 9.4–12.4)
POTASSIUM SERPL-SCNC: 4.6 MMOL/L (ref 3.5–5)
PROT SERPL-MCNC: 7.8 G/DL (ref 6.6–8.7)
PSA SERPL-MCNC: 0.79 NG/ML (ref 0–4)
RBC # BLD AUTO: 4.56 M/UL (ref 4.7–6.1)
SODIUM SERPL-SCNC: 137 MMOL/L (ref 136–145)
TESTOST SERPL-MCNC: 110.5 NG/DL (ref 193–740)
TRIGL SERPL-MCNC: 372 MG/DL (ref 0–149)
TSH SERPL DL<=0.005 MIU/L-ACNC: 2.45 UIU/ML (ref 0.27–4.2)
WBC # BLD AUTO: 16.4 K/UL (ref 4.8–10.8)

## 2023-08-07 PROCEDURE — 3078F DIAST BP <80 MM HG: CPT | Performed by: NURSE PRACTITIONER

## 2023-08-07 PROCEDURE — 3017F COLORECTAL CA SCREEN DOC REV: CPT | Performed by: NURSE PRACTITIONER

## 2023-08-07 PROCEDURE — 2022F DILAT RTA XM EVC RTNOPTHY: CPT | Performed by: NURSE PRACTITIONER

## 2023-08-07 PROCEDURE — G8417 CALC BMI ABV UP PARAM F/U: HCPCS | Performed by: NURSE PRACTITIONER

## 2023-08-07 PROCEDURE — 3046F HEMOGLOBIN A1C LEVEL >9.0%: CPT | Performed by: NURSE PRACTITIONER

## 2023-08-07 PROCEDURE — 4004F PT TOBACCO SCREEN RCVD TLK: CPT | Performed by: NURSE PRACTITIONER

## 2023-08-07 PROCEDURE — 99406 BEHAV CHNG SMOKING 3-10 MIN: CPT | Performed by: NURSE PRACTITIONER

## 2023-08-07 PROCEDURE — 99215 OFFICE O/P EST HI 40 MIN: CPT | Performed by: NURSE PRACTITIONER

## 2023-08-07 PROCEDURE — G8427 DOCREV CUR MEDS BY ELIG CLIN: HCPCS | Performed by: NURSE PRACTITIONER

## 2023-08-07 PROCEDURE — 3074F SYST BP LT 130 MM HG: CPT | Performed by: NURSE PRACTITIONER

## 2023-08-07 RX ORDER — METHYLPREDNISOLONE 4 MG/1
TABLET ORAL
Qty: 1 KIT | Refills: 0 | Status: SHIPPED | OUTPATIENT
Start: 2023-08-07

## 2023-08-07 RX ORDER — GABAPENTIN 300 MG/1
300 CAPSULE ORAL 4 TIMES DAILY
Qty: 120 CAPSULE | Refills: 0 | Status: SHIPPED | OUTPATIENT
Start: 2023-08-07 | End: 2023-09-06

## 2023-08-07 RX ORDER — PENICILLIN V POTASSIUM 500 MG/1
TABLET ORAL
COMMUNITY
Start: 2023-08-02

## 2023-08-07 ASSESSMENT — ENCOUNTER SYMPTOMS
NAUSEA: 0
VOICE CHANGE: 0
RHINORRHEA: 0
TROUBLE SWALLOWING: 1
SHORTNESS OF BREATH: 1
BACK PAIN: 0
PHOTOPHOBIA: 0
COUGH: 0
COLOR CHANGE: 0
VOMITING: 0

## 2023-08-07 NOTE — PROGRESS NOTES
[facility]  [address]  [place] [address]  Dept: [phone]  Dept Fax: [phone]  Loc: [phone]    Jamie Malhotra is a 60 y.o. male who presents today for his medical conditions/complaints as noted below. Jamie Malhotra is c/o of Follow-up (Pt stated that she has been having issues with neuropathy in feet)        HPI:     HPI   Chief Complaint   Patient presents with    Follow-up     Pt stated that she has been having issues with neuropathy in feet     Patient presents today for follow-up hypertension, DM2, depression. Blood pressure is well-controlled on medication. He is due for fasting labs. He complains of neuropathy in feet. Currently he is prescribed gabapentin 300 mg QID. He has actually been off of this x 1-2 months because of payment to pain management. He states he has been having \"gagging\" where he feels like his throat is closing up. He feels like food is getting caught. He is complaining of chronic congestion. He states he has chronic sinusitis. He  reports that he has been smoking cigarettes. He started smoking about 41 years ago. He has a 38.00 pack-year smoking history. He has never used smokeless tobacco.  He has appointment with pulmonologist later this week with CT chest.     Past Medical History:   Diagnosis Date    Arthritis     Asthma     Bronchitis     CAD (coronary artery disease)     Cerebral artery occlusion with cerebral infarction [facility])     Chronic obstructive pulmonary disease ([address]) 2/3/2021    Emphysema of lung ([address])     Follicular acne     as stated per pt. \"it comes and goes. Nothing helps it. I've been trying to tell doctors i need an antiobiotic. Hx of blood clots     DVT'S (as stated per pt)    Hypertension     Hypoglycemia     Knee joint pain     Other and unspecified hyperlipidemia     PVD (peripheral vascular disease) ([address])     Sleep apnea     pt does not use cpap or bipap. Self diagnosed.  Pt

## 2023-08-07 NOTE — PATIENT INSTRUCTIONS
Fasting labs in [address]. Resume gabapentin 300 mg 4 x daily. Must follow-up every 3 months for medication refills. Medrol dose pack. Follow-up for worsening symptoms or concerns. Referral to GI- will call with appointment.

## 2023-08-11 ENCOUNTER — TELEPHONE (OUTPATIENT)
Dept: PRIMARY CARE CLINIC | Age: 58
End: 2023-08-11

## 2023-08-15 RX ORDER — LOSARTAN POTASSIUM 50 MG/1
TABLET ORAL
Qty: 60 TABLET | Refills: 11 | Status: SHIPPED | OUTPATIENT
Start: 2023-08-15

## 2023-09-01 ENCOUNTER — TRANSCRIBE ORDERS (OUTPATIENT)
Dept: ADMINISTRATIVE | Facility: HOSPITAL | Age: 58
End: 2023-09-01
Payer: MEDICARE

## 2023-09-01 DIAGNOSIS — R91.8 OTHER NONSPECIFIC ABNORMAL FINDING OF LUNG FIELD: Primary | ICD-10-CM

## 2023-09-05 ENCOUNTER — TRANSCRIBE ORDERS (OUTPATIENT)
Dept: ADMINISTRATIVE | Facility: HOSPITAL | Age: 58
End: 2023-09-05
Payer: MEDICARE

## 2023-09-05 DIAGNOSIS — R91.8 ABNORMAL FINDING ON LUNG IMAGING: Primary | ICD-10-CM

## 2023-09-05 RX ORDER — ALBUTEROL SULFATE 90 UG/1
AEROSOL, METERED RESPIRATORY (INHALATION)
Qty: 18 G | Refills: 0 | Status: SHIPPED | OUTPATIENT
Start: 2023-09-05

## 2023-09-11 DIAGNOSIS — M48.00 SPINAL STENOSIS, UNSPECIFIED SPINAL REGION: ICD-10-CM

## 2023-09-11 DIAGNOSIS — G62.9 NEUROPATHY: ICD-10-CM

## 2023-09-12 RX ORDER — GABAPENTIN 300 MG/1
300 CAPSULE ORAL 4 TIMES DAILY
Qty: 120 CAPSULE | Refills: 0 | Status: SHIPPED | OUTPATIENT
Start: 2023-09-12 | End: 2023-10-12

## 2023-09-12 NOTE — TELEPHONE ENCOUNTER
Doyle Shahla called to request a refill on his medication. Last office visit : 8/7/2023   Next office visit : 11/7/2023     Last UDS:   Amphetamine Screen, Urine   Date Value Ref Range Status   08/08/2023 -  Final     Barbiturate Screen, Urine   Date Value Ref Range Status   08/08/2023 -  Final     Benzodiazepine Screen, Urine   Date Value Ref Range Status   08/08/2023 -  Final     Buprenorphine Urine   Date Value Ref Range Status   08/08/2023 -  Final     Cocaine Metabolite Screen, Urine   Date Value Ref Range Status   08/08/2023 -  Final     MDMA, Urine   Date Value Ref Range Status   08/08/2023 -  Final     Methamphetamine, Urine   Date Value Ref Range Status   08/08/2023 -  Final     Opiate Scrn, Ur   Date Value Ref Range Status   08/08/2023 +  Final     Oxycodone Screen, Ur   Date Value Ref Range Status   08/08/2023 -  Final     PCP Screen, Urine   Date Value Ref Range Status   08/08/2023 -  Final     Propoxyphene Screen, Urine   Date Value Ref Range Status   08/08/2023 -  Final     THC Screen, Urine   Date Value Ref Range Status   08/08/2023 +  Final     Tricyclic Antidepressants, Urine   Date Value Ref Range Status   08/08/2023 +  Final       Last Tru Sauce: 08.07.2023  Medication Contract: 08.07.2023   Last Fill: 08.07.2023    Requested Prescriptions     Pending Prescriptions Disp Refills    gabapentin (NEURONTIN) 300 MG capsule [Pharmacy Med Name: GABAPENTIN 300 MG CAPSULE] 120 capsule 0     Sig: Take 1 capsule by mouth 4 times daily for 30 days. Max Daily Amount: 1,200 mg         Please approve or refuse this medication.    Rachael cMneil, [facility]

## 2023-09-15 ENCOUNTER — HOSPITAL ENCOUNTER (OUTPATIENT)
Dept: CT IMAGING | Facility: HOSPITAL | Age: 58
Discharge: HOME | End: 2023-09-15
Payer: MEDICARE

## 2023-09-15 VITALS
DIASTOLIC BLOOD PRESSURE: 85 MMHG | RESPIRATION RATE: 14 BRPM | TEMPERATURE: 97.3 F | SYSTOLIC BLOOD PRESSURE: 143 MMHG | HEART RATE: 72 BPM | BODY MASS INDEX: 48.49 KG/M2 | HEIGHT: 64 IN | WEIGHT: 284 LBS

## 2023-09-15 DIAGNOSIS — R91.8 OTHER NONSPECIFIC ABNORMAL FINDING OF LUNG FIELD: ICD-10-CM

## 2023-09-15 LAB
APTT PPP: 26.7 SECONDS (ref 24.1–35)
INR PPP: 0.97 (ref 0.91–1.09)
PLATELET # BLD AUTO: 477 10*3/MM3 (ref 140–450)
PROTHROMBIN TIME: 13 SECONDS (ref 11.8–14.8)

## 2023-09-15 PROCEDURE — 85049 AUTOMATED PLATELET COUNT: CPT | Performed by: RADIOLOGY

## 2023-09-15 PROCEDURE — 85610 PROTHROMBIN TIME: CPT | Performed by: RADIOLOGY

## 2023-09-15 PROCEDURE — 85730 THROMBOPLASTIN TIME PARTIAL: CPT | Performed by: RADIOLOGY

## 2023-09-15 RX ORDER — GABAPENTIN 300 MG/1
300 CAPSULE ORAL 4 TIMES DAILY
COMMUNITY
Start: 2023-09-12 | End: 2023-10-13

## 2023-09-15 RX ORDER — SODIUM CHLORIDE 0.9 % (FLUSH) 0.9 %
10 SYRINGE (ML) INJECTION AS NEEDED
Status: DISCONTINUED | OUTPATIENT
Start: 2023-09-15 | End: 2023-09-16 | Stop reason: HOSPADM

## 2023-09-15 RX ORDER — METOPROLOL SUCCINATE 25 MG/1
25 TABLET, EXTENDED RELEASE ORAL DAILY
COMMUNITY
Start: 2023-05-10

## 2023-09-15 RX ORDER — SODIUM CHLORIDE 0.9 % (FLUSH) 0.9 %
3 SYRINGE (ML) INJECTION EVERY 12 HOURS SCHEDULED
Status: DISCONTINUED | OUTPATIENT
Start: 2023-09-15 | End: 2023-09-16 | Stop reason: HOSPADM

## 2023-09-15 RX ORDER — AMLODIPINE BESYLATE 5 MG/1
5 TABLET ORAL NIGHTLY
COMMUNITY
Start: 2023-04-11

## 2023-09-15 RX ORDER — ALBUTEROL SULFATE 90 UG/1
2 AEROSOL, METERED RESPIRATORY (INHALATION) EVERY 6 HOURS PRN
COMMUNITY
Start: 2023-09-05

## 2023-09-15 RX ORDER — LOTEPREDNOL ETABONATE 3.8 MG/G
0.38 GEL OPHTHALMIC
COMMUNITY
Start: 2023-09-02

## 2023-09-15 RX ORDER — LOSARTAN POTASSIUM 50 MG/1
50 TABLET ORAL 2 TIMES DAILY
COMMUNITY
Start: 2023-08-15

## 2023-09-15 RX ORDER — SODIUM CHLORIDE 9 MG/ML
40 INJECTION, SOLUTION INTRAVENOUS AS NEEDED
Status: DISCONTINUED | OUTPATIENT
Start: 2023-09-15 | End: 2023-09-16 | Stop reason: HOSPADM

## 2023-09-15 RX ORDER — BUPROPION HYDROCHLORIDE 150 MG/1
150 TABLET ORAL EVERY MORNING
COMMUNITY
Start: 2023-06-20

## 2023-09-15 NOTE — NURSING NOTE
Toya explained to patient that he would have a 4 hour post op stay and need a  to go home.  Pt states he has a  but wasn't prepared to stay all day.  Patient decided to reschedule.

## 2023-09-27 ENCOUNTER — HOSPITAL ENCOUNTER (OUTPATIENT)
Dept: CT IMAGING | Facility: HOSPITAL | Age: 58
Discharge: HOME | End: 2023-09-27
Payer: MEDICARE

## 2023-09-27 DIAGNOSIS — R91.8 ABNORMAL FINDING ON LUNG IMAGING: ICD-10-CM

## 2023-09-27 PROCEDURE — A9552 F18 FDG: HCPCS | Performed by: SPECIALIST

## 2023-09-27 PROCEDURE — 78815 PET IMAGE W/CT SKULL-THIGH: CPT

## 2023-09-27 PROCEDURE — 0 FLUDEOXYGLUCOSE F18 SOLUTION: Performed by: SPECIALIST

## 2023-09-27 RX ADMIN — FLUDEOXYGLUCOSE F18 1 DOSE: 300 INJECTION INTRAVENOUS at 09:36

## 2023-10-09 DIAGNOSIS — I10 ESSENTIAL HYPERTENSION: ICD-10-CM

## 2023-10-09 RX ORDER — AMLODIPINE BESYLATE 5 MG/1
5 TABLET ORAL DAILY
Qty: 90 TABLET | Refills: 3 | Status: SHIPPED | OUTPATIENT
Start: 2023-10-09

## 2023-10-23 DIAGNOSIS — G62.9 NEUROPATHY: ICD-10-CM

## 2023-10-23 DIAGNOSIS — M48.00 SPINAL STENOSIS, UNSPECIFIED SPINAL REGION: ICD-10-CM

## 2023-10-23 RX ORDER — GABAPENTIN 300 MG/1
CAPSULE ORAL
Qty: 120 CAPSULE | Refills: 0 | Status: SHIPPED | OUTPATIENT
Start: 2023-10-23 | End: 2023-11-22

## 2023-10-23 NOTE — TELEPHONE ENCOUNTER
ALONDRA was reviewed today per office protocol. Report shows No discrepancies. Fill pattern is consistent from single provider(s) at single pharmacy(s).

## 2023-11-07 ENCOUNTER — OFFICE VISIT (OUTPATIENT)
Dept: PRIMARY CARE CLINIC | Age: 58
End: 2023-11-07
Payer: MEDICARE

## 2023-11-07 VITALS
HEART RATE: 82 BPM | WEIGHT: 292 LBS | SYSTOLIC BLOOD PRESSURE: 128 MMHG | OXYGEN SATURATION: 96 % | HEIGHT: 68 IN | DIASTOLIC BLOOD PRESSURE: 84 MMHG | BODY MASS INDEX: 44.25 KG/M2 | TEMPERATURE: 97.3 F

## 2023-11-07 DIAGNOSIS — F33.0 MAJOR DEPRESSIVE DISORDER, RECURRENT, MILD (HCC): ICD-10-CM

## 2023-11-07 DIAGNOSIS — K21.9 GASTROESOPHAGEAL REFLUX DISEASE WITHOUT ESOPHAGITIS: ICD-10-CM

## 2023-11-07 DIAGNOSIS — G62.9 NEUROPATHY: ICD-10-CM

## 2023-11-07 DIAGNOSIS — R73.09 OTHER ABNORMAL GLUCOSE: ICD-10-CM

## 2023-11-07 DIAGNOSIS — I10 ESSENTIAL HYPERTENSION: Primary | ICD-10-CM

## 2023-11-07 PROCEDURE — 3079F DIAST BP 80-89 MM HG: CPT | Performed by: NURSE PRACTITIONER

## 2023-11-07 PROCEDURE — 3017F COLORECTAL CA SCREEN DOC REV: CPT | Performed by: NURSE PRACTITIONER

## 2023-11-07 PROCEDURE — G8484 FLU IMMUNIZE NO ADMIN: HCPCS | Performed by: NURSE PRACTITIONER

## 2023-11-07 PROCEDURE — 4004F PT TOBACCO SCREEN RCVD TLK: CPT | Performed by: NURSE PRACTITIONER

## 2023-11-07 PROCEDURE — G8417 CALC BMI ABV UP PARAM F/U: HCPCS | Performed by: NURSE PRACTITIONER

## 2023-11-07 PROCEDURE — 99214 OFFICE O/P EST MOD 30 MIN: CPT | Performed by: NURSE PRACTITIONER

## 2023-11-07 PROCEDURE — 3074F SYST BP LT 130 MM HG: CPT | Performed by: NURSE PRACTITIONER

## 2023-11-07 PROCEDURE — G8427 DOCREV CUR MEDS BY ELIG CLIN: HCPCS | Performed by: NURSE PRACTITIONER

## 2023-11-07 RX ORDER — AMITRIPTYLINE HYDROCHLORIDE 10 MG/1
10 TABLET, FILM COATED ORAL NIGHTLY
Qty: 90 TABLET | Refills: 1 | Status: SHIPPED | OUTPATIENT
Start: 2023-11-07

## 2023-11-07 ASSESSMENT — ENCOUNTER SYMPTOMS
BACK PAIN: 0
VOICE CHANGE: 0
COLOR CHANGE: 0
VOMITING: 0
RHINORRHEA: 0
PHOTOPHOBIA: 0
NAUSEA: 0
COUGH: 0
SHORTNESS OF BREATH: 0

## 2023-11-07 NOTE — PROGRESS NOTES
11/7/2023 at 1:37 PM    EMR Dragon/transcription disclaimer:  Much of thisencounter note is electronic transcription/translation of spoken language to printed texts. The electronic translation of spoken language may be erroneous, or at times, nonsensical words or phrases may be inadvertentlytranscribed.   Although I have reviewed the note for such errors, some may still exist.

## 2023-11-08 ENCOUNTER — OFFICE VISIT (OUTPATIENT)
Dept: ENT CLINIC | Age: 58
End: 2023-11-08
Payer: MEDICARE

## 2023-11-08 VITALS
HEIGHT: 67 IN | DIASTOLIC BLOOD PRESSURE: 72 MMHG | BODY MASS INDEX: 43.95 KG/M2 | SYSTOLIC BLOOD PRESSURE: 136 MMHG | WEIGHT: 280 LBS

## 2023-11-08 DIAGNOSIS — J34.89 NASAL OBSTRUCTION: Primary | ICD-10-CM

## 2023-11-08 PROCEDURE — G8427 DOCREV CUR MEDS BY ELIG CLIN: HCPCS | Performed by: PHYSICIAN ASSISTANT

## 2023-11-08 PROCEDURE — G8484 FLU IMMUNIZE NO ADMIN: HCPCS | Performed by: PHYSICIAN ASSISTANT

## 2023-11-08 PROCEDURE — G8417 CALC BMI ABV UP PARAM F/U: HCPCS | Performed by: PHYSICIAN ASSISTANT

## 2023-11-08 PROCEDURE — 3017F COLORECTAL CA SCREEN DOC REV: CPT | Performed by: PHYSICIAN ASSISTANT

## 2023-11-08 PROCEDURE — 3075F SYST BP GE 130 - 139MM HG: CPT | Performed by: PHYSICIAN ASSISTANT

## 2023-11-08 PROCEDURE — 99203 OFFICE O/P NEW LOW 30 MIN: CPT | Performed by: PHYSICIAN ASSISTANT

## 2023-11-08 PROCEDURE — 4004F PT TOBACCO SCREEN RCVD TLK: CPT | Performed by: PHYSICIAN ASSISTANT

## 2023-11-08 PROCEDURE — 3078F DIAST BP <80 MM HG: CPT | Performed by: PHYSICIAN ASSISTANT

## 2023-11-08 RX ORDER — FLUTICASONE PROPIONATE 50 MCG
2 SPRAY, SUSPENSION (ML) NASAL 2 TIMES DAILY
Qty: 16 G | Refills: 2 | Status: SHIPPED | OUTPATIENT
Start: 2023-11-08

## 2023-11-08 ASSESSMENT — ENCOUNTER SYMPTOMS
TROUBLE SWALLOWING: 0
VOICE CHANGE: 0
SINUS PRESSURE: 0
SINUS PAIN: 0
EYE PAIN: 0
SORE THROAT: 0
PHOTOPHOBIA: 0
RHINORRHEA: 0
FACIAL SWELLING: 0

## 2023-11-08 NOTE — ASSESSMENT & PLAN NOTE
Nasal obstruction secondary to nasal turbinate hypertrophy with deviated septum  Plan: I recommended a trial with Astelin nasal spray and Flonase to see if this helps with the obstruction. He is to follow-up in 1 month. If this does not help his symptoms, we will get a CT of the facial bones for further evaluation.

## 2023-11-29 DIAGNOSIS — F33.0 MAJOR DEPRESSIVE DISORDER, RECURRENT, MILD (HCC): ICD-10-CM

## 2023-11-29 DIAGNOSIS — Z72.0 TOBACCO ABUSE DISORDER: ICD-10-CM

## 2023-12-01 NOTE — TELEPHONE ENCOUNTER
Maria Teresa Arango called to request a refill on his medication.       Last office visit : 11/7/2023   Next office visit : 5/8/2024     Requested Prescriptions     Pending Prescriptions Disp Refills    buPROPion (WELLBUTRIN XL) 150 MG extended release tablet [Pharmacy Med Name: BUPROPION HCL  MG TABLET] 90 tablet 0     Sig: TAKE ONE TABLET BY MOUTH EVERY MORNING            Julia Hauser LPN

## 2023-12-04 RX ORDER — BUPROPION HYDROCHLORIDE 150 MG/1
TABLET ORAL
Qty: 90 TABLET | Refills: 0 | Status: SHIPPED | OUTPATIENT
Start: 2023-12-04

## 2023-12-04 RX ORDER — ALBUTEROL SULFATE 90 UG/1
AEROSOL, METERED RESPIRATORY (INHALATION)
Qty: 18 G | Refills: 0 | Status: SHIPPED | OUTPATIENT
Start: 2023-12-04

## 2023-12-04 NOTE — TELEPHONE ENCOUNTER
Mita Munoz called requesting a refill of the below medication which has been pended for you:     Requested Prescriptions     Pending Prescriptions Disp Refills    albuterol sulfate HFA (PROVENTIL;VENTOLIN;PROAIR) 108 (90 Base) MCG/ACT inhaler [Pharmacy Med Name: ALBUTEROL SUL HFA 90 MCG INH] 18 g 0     Sig: INHALE 2 PUFFS BY MOUTH EVERY 6 HOURS AS NEEDED       Last Appointment Date: 11/7/2023  Next Appointment Date: 5/8/2024    Allergies   Allergen Reactions    Iodine      Iodine containing multi vitamin    Lisinopril      cough    Shellfish-Derived Products Swelling

## 2023-12-26 DIAGNOSIS — R30.0 DYSURIA: Primary | ICD-10-CM

## 2024-01-02 ENCOUNTER — OFFICE VISIT (OUTPATIENT)
Dept: ENT CLINIC | Age: 59
End: 2024-01-02
Payer: MEDICARE

## 2024-01-02 ENCOUNTER — NURSE ONLY (OUTPATIENT)
Dept: PRIMARY CARE CLINIC | Age: 59
End: 2024-01-02

## 2024-01-02 VITALS
BODY MASS INDEX: 44.73 KG/M2 | HEIGHT: 67 IN | DIASTOLIC BLOOD PRESSURE: 74 MMHG | WEIGHT: 285 LBS | SYSTOLIC BLOOD PRESSURE: 128 MMHG

## 2024-01-02 DIAGNOSIS — Z79.899 DRUG THERAPY: Primary | ICD-10-CM

## 2024-01-02 DIAGNOSIS — J34.89 NASAL OBSTRUCTION: Primary | ICD-10-CM

## 2024-01-02 PROCEDURE — 3074F SYST BP LT 130 MM HG: CPT | Performed by: PHYSICIAN ASSISTANT

## 2024-01-02 PROCEDURE — G8417 CALC BMI ABV UP PARAM F/U: HCPCS | Performed by: PHYSICIAN ASSISTANT

## 2024-01-02 PROCEDURE — 3078F DIAST BP <80 MM HG: CPT | Performed by: PHYSICIAN ASSISTANT

## 2024-01-02 PROCEDURE — 99999 PR OFFICE/OUTPT VISIT,PROCEDURE ONLY: CPT | Performed by: NURSE PRACTITIONER

## 2024-01-02 PROCEDURE — 99213 OFFICE O/P EST LOW 20 MIN: CPT | Performed by: PHYSICIAN ASSISTANT

## 2024-01-02 PROCEDURE — G8427 DOCREV CUR MEDS BY ELIG CLIN: HCPCS | Performed by: PHYSICIAN ASSISTANT

## 2024-01-02 PROCEDURE — 3017F COLORECTAL CA SCREEN DOC REV: CPT | Performed by: PHYSICIAN ASSISTANT

## 2024-01-02 PROCEDURE — 4004F PT TOBACCO SCREEN RCVD TLK: CPT | Performed by: PHYSICIAN ASSISTANT

## 2024-01-02 PROCEDURE — G8484 FLU IMMUNIZE NO ADMIN: HCPCS | Performed by: PHYSICIAN ASSISTANT

## 2024-01-02 NOTE — PROGRESS NOTES
Mr. Menchaca is a pleasant 58-year-old  male that presents for a 1 month follow-up due to nasal obstruction.  Patient was found to have a deviated septum to the right with nasal turbanate hypertrophy.  This was treated with Flonase nasal spray and Astelin.    Patient reports that he is still noted with the nasal obstruction and did not notice any difference with the medications.  Patient has a history of traumatic injury 3 years ago after an accidental fall to the facial area.  He denies any previous surgery to the nasal area or sinus area.      Physical examination revealed the patient to have a deviated septum to the right with obstruction noted from the deviation.  He is also noted to have obstruction on the left side due to nasal turbinate hypertrophy.  No nasal polyps or masses were grossly noted.  Ears demonstrate normal TMs canals bilaterally.  Oral exam demonstrated the tongue to be midline with no abnormalities to the posterior pharynx.  No sinus tenderness was appreciated to percussion.      Impression: Persistent nasal obstruction with failed medical management    Plan: I recommended getting a CT of the facial bones for further evaluation.  I will call him the results with further recommendations to follow.      Electronically signed by LUZ MARIA ALVAREZ PA-C on 1/2/24 at 2:02 PM CST

## 2024-01-03 DIAGNOSIS — G62.9 NEUROPATHY: ICD-10-CM

## 2024-01-03 DIAGNOSIS — M48.00 SPINAL STENOSIS, UNSPECIFIED SPINAL REGION: ICD-10-CM

## 2024-01-04 NOTE — TELEPHONE ENCOUNTER
Cristina called requesting a refill of the below medication which has been pended for you:     Requested Prescriptions     Pending Prescriptions Disp Refills    gabapentin (NEURONTIN) 300 MG capsule [Pharmacy Med Name: GABAPENTIN 300 MG CAPSULE] 120 capsule 0     Sig: TAKE ONE CAPSULE BY MOUTH FOUR TIMES A DAY       Last Appointment Date: 11/7/2023  Next Appointment Date: 5/8/2024    Allergies   Allergen Reactions    Iodine      Iodine containing multi vitamin    Lisinopril      cough    Shellfish-Derived Products Swelling

## 2024-01-08 RX ORDER — GABAPENTIN 300 MG/1
CAPSULE ORAL
Qty: 120 CAPSULE | Refills: 0 | Status: SHIPPED | OUTPATIENT
Start: 2024-01-08 | End: 2024-02-07

## 2024-02-14 ENCOUNTER — HOSPITAL ENCOUNTER (OUTPATIENT)
Dept: CT IMAGING | Age: 59
Discharge: HOME | End: 2024-02-14
Payer: MEDICARE

## 2024-02-14 DIAGNOSIS — J34.89 NASAL OBSTRUCTION: ICD-10-CM

## 2024-02-14 PROCEDURE — 70486 CT MAXILLOFACIAL W/O DYE: CPT

## 2024-02-19 ENCOUNTER — TELEPHONE (OUTPATIENT)
Dept: ENT CLINIC | Age: 59
End: 2024-02-19

## 2024-02-19 RX ORDER — PREDNISONE 20 MG/1
TABLET ORAL
Qty: 34 TABLET | Refills: 0 | Status: SHIPPED | OUTPATIENT
Start: 2024-02-19

## 2024-02-19 RX ORDER — CLINDAMYCIN HYDROCHLORIDE 300 MG/1
300 CAPSULE ORAL 3 TIMES DAILY
Qty: 63 CAPSULE | Refills: 0 | Status: SHIPPED | OUTPATIENT
Start: 2024-02-19 | End: 2024-03-11

## 2024-02-19 NOTE — TELEPHONE ENCOUNTER
Pt called requesting someone to go over his CT before going to his doctor's appt tomorrow.     I called the patient the CT results.  After review, the patient was noted to have significant sinus disease to the sphenoid sinuses with the nasal passageway fairly open with no obstruction.  Recommended treating the patient with prednisone and 3 weeks of clindamycin.  He is to follow-up in 1 month.    Electronically signed by LUZ MARIA ALVAREZ PA-C on 2/19/24 at 6:40 PM CST

## 2024-02-20 DIAGNOSIS — M48.00 SPINAL STENOSIS, UNSPECIFIED SPINAL REGION: ICD-10-CM

## 2024-02-20 DIAGNOSIS — G62.9 NEUROPATHY: ICD-10-CM

## 2024-02-21 NOTE — TELEPHONE ENCOUNTER
Cristina Menchaca called to request a refill on his medication.      Last office visit : 11/7/2023   Next office visit : 5/8/2024     Last Paulie: 2/21/24  Medication Contract: 8/7/23   Last UDS: 1/2/24  Last Rx: 1/8/24    Amphetamine Screen, Urine   Date Value Ref Range Status   01/02/2024 -  Final     Barbiturate Screen, Urine   Date Value Ref Range Status   01/02/2024 -  Final     Benzodiazepine Screen, Urine   Date Value Ref Range Status   01/02/2024 -  Final     Buprenorphine Urine   Date Value Ref Range Status   01/02/2024 -  Final     Cocaine Metabolite Screen, Urine   Date Value Ref Range Status   01/02/2024 -  Final     MDMA, Urine   Date Value Ref Range Status   01/02/2024 -  Final     Methamphetamine, Urine   Date Value Ref Range Status   01/02/2024 -  Final     Opiate Scrn, Ur   Date Value Ref Range Status   01/02/2024 -  Final     Oxycodone Screen, Ur   Date Value Ref Range Status   01/02/2024 -  Final     PCP Screen, Urine   Date Value Ref Range Status   01/02/2024 -  Final     Propoxyphene Screen, Urine   Date Value Ref Range Status   01/02/2024 -  Final     THC Screen, Urine   Date Value Ref Range Status   01/02/2024 +  Final     Tricyclic Antidepressants, Urine   Date Value Ref Range Status   01/02/2024 +  Final           Requested Prescriptions     Pending Prescriptions Disp Refills    albuterol sulfate HFA (PROVENTIL;VENTOLIN;PROAIR) 108 (90 Base) MCG/ACT inhaler [Pharmacy Med Name: ALBUTEROL SUL HFA 90 MCG INH] 18 g 0     Sig: INHALE 2 PUFFS BY MOUTH EVERY 6 HOURS AS NEEDED    gabapentin (NEURONTIN) 300 MG capsule [Pharmacy Med Name: GABAPENTIN 300 MG CAPSULE] 120 capsule 0     Sig: TAKE ONE CAPSULE BY MOUTH FOUR TIMES A DAY         Please approve or refuse this medication.   David Polo MA

## 2024-02-22 RX ORDER — GABAPENTIN 300 MG/1
300 CAPSULE ORAL 4 TIMES DAILY
Qty: 120 CAPSULE | Refills: 0 | Status: SHIPPED | OUTPATIENT
Start: 2024-02-22 | End: 2024-03-23

## 2024-02-22 RX ORDER — ALBUTEROL SULFATE 90 UG/1
AEROSOL, METERED RESPIRATORY (INHALATION)
Qty: 18 G | Refills: 3 | Status: SHIPPED | OUTPATIENT
Start: 2024-02-22

## 2024-04-02 DIAGNOSIS — F33.0 MAJOR DEPRESSIVE DISORDER, RECURRENT, MILD (HCC): ICD-10-CM

## 2024-04-02 DIAGNOSIS — G62.9 NEUROPATHY: ICD-10-CM

## 2024-04-02 DIAGNOSIS — Z72.0 TOBACCO ABUSE DISORDER: ICD-10-CM

## 2024-04-02 DIAGNOSIS — M48.00 SPINAL STENOSIS, UNSPECIFIED SPINAL REGION: ICD-10-CM

## 2024-04-04 NOTE — TELEPHONE ENCOUNTER
Cristina Menchaca called to request a refill on his medication.      Last office visit : 11/7/2023   Next office visit : 5/8/2024     Last UDS:   Amphetamine Screen, Urine   Date Value Ref Range Status   01/02/2024 -  Final     Barbiturate Screen, Urine   Date Value Ref Range Status   01/02/2024 -  Final     Benzodiazepine Screen, Urine   Date Value Ref Range Status   01/02/2024 -  Final     Buprenorphine Urine   Date Value Ref Range Status   01/02/2024 -  Final     Cocaine Metabolite Screen, Urine   Date Value Ref Range Status   01/02/2024 -  Final     MDMA, Urine   Date Value Ref Range Status   01/02/2024 -  Final     Methamphetamine, Urine   Date Value Ref Range Status   01/02/2024 -  Final     Opiate Scrn, Ur   Date Value Ref Range Status   01/02/2024 -  Final     Oxycodone Screen, Ur   Date Value Ref Range Status   01/02/2024 -  Final     PCP Screen, Urine   Date Value Ref Range Status   01/02/2024 -  Final     Propoxyphene Screen, Urine   Date Value Ref Range Status   01/02/2024 -  Final     THC Screen, Urine   Date Value Ref Range Status   01/02/2024 +  Final     Tricyclic Antidepressants, Urine   Date Value Ref Range Status   01/02/2024 +  Final       Last Paulie: 02-  Medication Contract: 08-  Last Fill: 02-    Requested Prescriptions     Pending Prescriptions Disp Refills    gabapentin (NEURONTIN) 300 MG capsule [Pharmacy Med Name: GABAPENTIN 300 MG CAPSULE] 120 capsule 0     Sig: TAKE ONE CAPSULE BY MOUTH FOUR TIMES A DAY    buPROPion (WELLBUTRIN XL) 150 MG extended release tablet [Pharmacy Med Name: BUPROPION HCL  MG TABLET] 90 tablet 0     Sig: TAKE ONE TABLET BY MOUTH EVERY MORNING         Please approve or refuse this medication.   Unique Morse MA

## 2024-04-08 RX ORDER — GABAPENTIN 300 MG/1
CAPSULE ORAL
Qty: 120 CAPSULE | Refills: 0 | Status: SHIPPED | OUTPATIENT
Start: 2024-04-08 | End: 2024-05-08

## 2024-04-08 RX ORDER — BUPROPION HYDROCHLORIDE 150 MG/1
TABLET ORAL
Qty: 90 TABLET | Refills: 0 | Status: SHIPPED | OUTPATIENT
Start: 2024-04-08

## 2024-05-08 DIAGNOSIS — G62.9 NEUROPATHY: ICD-10-CM

## 2024-05-08 DIAGNOSIS — I10 ESSENTIAL HYPERTENSION: ICD-10-CM

## 2024-05-08 DIAGNOSIS — M48.00 SPINAL STENOSIS, UNSPECIFIED SPINAL REGION: ICD-10-CM

## 2024-05-08 RX ORDER — GABAPENTIN 300 MG/1
CAPSULE ORAL
Qty: 120 CAPSULE | Refills: 0 | Status: SHIPPED | OUTPATIENT
Start: 2024-05-08 | End: 2024-06-07

## 2024-05-08 RX ORDER — METOPROLOL SUCCINATE 25 MG/1
TABLET, EXTENDED RELEASE ORAL
Qty: 90 TABLET | Refills: 0 | Status: SHIPPED | OUTPATIENT
Start: 2024-05-08

## 2024-05-08 NOTE — TELEPHONE ENCOUNTER
Cristina Menchaca called to request a refill on his medication.      Last office visit : 11/7/2023   Next office visit : 5/13/2024     Last UDS:   Amphetamine Screen, Urine   Date Value Ref Range Status   01/02/2024 -  Final     Barbiturate Screen, Urine   Date Value Ref Range Status   01/02/2024 -  Final     Benzodiazepine Screen, Urine   Date Value Ref Range Status   01/02/2024 -  Final     Buprenorphine Urine   Date Value Ref Range Status   01/02/2024 -  Final     Cocaine Metabolite Screen, Urine   Date Value Ref Range Status   01/02/2024 -  Final     MDMA, Urine   Date Value Ref Range Status   01/02/2024 -  Final     Opiate Scrn, Ur   Date Value Ref Range Status   01/02/2024 -  Final     Oxycodone Screen, Ur   Date Value Ref Range Status   01/02/2024 -  Final     PCP Screen, Urine   Date Value Ref Range Status   01/02/2024 -  Final     Propoxyphene Screen, Urine   Date Value Ref Range Status   01/02/2024 -  Final     THC Screen, Urine   Date Value Ref Range Status   01/02/2024 +  Final     Tricyclic Antidepressants, Urine   Date Value Ref Range Status   01/02/2024 +  Final       Last Paulie: 2/21/24  Medication Contract: 8/7/23   Last Fill: 4/8/24    Requested Prescriptions     Pending Prescriptions Disp Refills    metoprolol succinate (TOPROL XL) 25 MG extended release tablet [Pharmacy Med Name: METOPROLOL SUCC ER 25 MG TAB] 90 tablet 0     Sig: TAKE ONE TABLET BY MOUTH EVERY DAY    gabapentin (NEURONTIN) 300 MG capsule [Pharmacy Med Name: GABAPENTIN 300 MG CAPSULE] 120 capsule 0     Sig: TAKE ONE CAPSULE BY MOUTH FOUR TIMES A DAY         Please approve or refuse this medication.   Lissett Maldonado MA

## 2024-05-09 DIAGNOSIS — K21.9 GASTROESOPHAGEAL REFLUX DISEASE WITHOUT ESOPHAGITIS: ICD-10-CM

## 2024-05-09 DIAGNOSIS — F33.0 MAJOR DEPRESSIVE DISORDER, RECURRENT, MILD (HCC): ICD-10-CM

## 2024-05-09 DIAGNOSIS — G62.9 NEUROPATHY: ICD-10-CM

## 2024-05-09 DIAGNOSIS — R30.0 DYSURIA: ICD-10-CM

## 2024-05-09 DIAGNOSIS — R73.09 OTHER ABNORMAL GLUCOSE: ICD-10-CM

## 2024-05-09 DIAGNOSIS — I10 ESSENTIAL HYPERTENSION: ICD-10-CM

## 2024-05-09 LAB
ALBUMIN SERPL-MCNC: 3.9 G/DL (ref 3.5–5.2)
ALP SERPL-CCNC: 112 U/L (ref 40–130)
ALT SERPL-CCNC: 18 U/L (ref 5–41)
ANION GAP SERPL CALCULATED.3IONS-SCNC: 16 MMOL/L (ref 7–19)
AST SERPL-CCNC: 16 U/L (ref 5–40)
BASOPHILS # BLD: 0.1 K/UL (ref 0–0.2)
BASOPHILS NFR BLD: 0.8 % (ref 0–1)
BILIRUB SERPL-MCNC: 0.2 MG/DL (ref 0.2–1.2)
BILIRUB UR QL STRIP: NEGATIVE
BUN SERPL-MCNC: 20 MG/DL (ref 6–20)
CALCIUM SERPL-MCNC: 9.8 MG/DL (ref 8.6–10)
CHLORIDE SERPL-SCNC: 101 MMOL/L (ref 98–111)
CHOLEST SERPL-MCNC: 212 MG/DL (ref 160–199)
CLARITY UR: CLEAR
CO2 SERPL-SCNC: 20 MMOL/L (ref 22–29)
COLOR UR: YELLOW
CREAT SERPL-MCNC: 1.1 MG/DL (ref 0.5–1.2)
EOSINOPHIL # BLD: 0.9 K/UL (ref 0–0.6)
EOSINOPHIL NFR BLD: 6.1 % (ref 0–5)
ERYTHROCYTE [DISTWIDTH] IN BLOOD BY AUTOMATED COUNT: 12.9 % (ref 11.5–14.5)
GLUCOSE SERPL-MCNC: 164 MG/DL (ref 74–109)
GLUCOSE UR STRIP.AUTO-MCNC: NEGATIVE MG/DL
HBA1C MFR BLD: 6.3 % (ref 4–6)
HCT VFR BLD AUTO: 40.3 % (ref 42–52)
HDLC SERPL-MCNC: 29 MG/DL (ref 55–121)
HGB BLD-MCNC: 13.1 G/DL (ref 14–18)
HGB UR STRIP.AUTO-MCNC: NEGATIVE MG/L
IMM GRANULOCYTES # BLD: 0.1 K/UL
KETONES UR STRIP.AUTO-MCNC: NEGATIVE MG/DL
LDLC SERPL CALC-MCNC: 143 MG/DL
LEUKOCYTE ESTERASE UR QL STRIP.AUTO: NEGATIVE
LYMPHOCYTES # BLD: 2.7 K/UL (ref 1.1–4.5)
LYMPHOCYTES NFR BLD: 18.9 % (ref 20–40)
MCH RBC QN AUTO: 30.2 PG (ref 27–31)
MCHC RBC AUTO-ENTMCNC: 32.5 G/DL (ref 33–37)
MCV RBC AUTO: 92.9 FL (ref 80–94)
MONOCYTES # BLD: 1 K/UL (ref 0–0.9)
MONOCYTES NFR BLD: 6.7 % (ref 0–10)
NEUTROPHILS # BLD: 9.6 K/UL (ref 1.5–7.5)
NEUTS SEG NFR BLD: 67.1 % (ref 50–65)
NITRITE UR QL STRIP.AUTO: NEGATIVE
PH UR STRIP.AUTO: 5.5 [PH] (ref 5–8)
PLATELET # BLD AUTO: 504 K/UL (ref 130–400)
PMV BLD AUTO: 9.8 FL (ref 9.4–12.4)
POTASSIUM SERPL-SCNC: 3.8 MMOL/L (ref 3.5–5)
PROT SERPL-MCNC: 7.2 G/DL (ref 6.6–8.7)
PROT UR STRIP.AUTO-MCNC: ABNORMAL MG/DL
RBC # BLD AUTO: 4.34 M/UL (ref 4.7–6.1)
SODIUM SERPL-SCNC: 137 MMOL/L (ref 136–145)
SP GR UR STRIP.AUTO: 1.02 (ref 1–1.03)
TRIGL SERPL-MCNC: 200 MG/DL (ref 0–149)
TSH SERPL DL<=0.005 MIU/L-ACNC: 1.93 UIU/ML (ref 0.27–4.2)
UROBILINOGEN UR STRIP.AUTO-MCNC: 0.2 E.U./DL
WBC # BLD AUTO: 14.3 K/UL (ref 4.8–10.8)

## 2024-05-13 ENCOUNTER — OFFICE VISIT (OUTPATIENT)
Dept: PRIMARY CARE CLINIC | Age: 59
End: 2024-05-13

## 2024-05-13 VITALS
TEMPERATURE: 97.5 F | WEIGHT: 284 LBS | HEIGHT: 67 IN | SYSTOLIC BLOOD PRESSURE: 132 MMHG | OXYGEN SATURATION: 95 % | BODY MASS INDEX: 44.57 KG/M2 | DIASTOLIC BLOOD PRESSURE: 74 MMHG | HEART RATE: 74 BPM

## 2024-05-13 DIAGNOSIS — E29.1 HYPOGONADISM MALE: ICD-10-CM

## 2024-05-13 DIAGNOSIS — Z72.0 TOBACCO ABUSE DISORDER: ICD-10-CM

## 2024-05-13 DIAGNOSIS — I25.118 CORONARY ARTERY DISEASE OF NATIVE ARTERY OF NATIVE HEART WITH STABLE ANGINA PECTORIS (HCC): ICD-10-CM

## 2024-05-13 DIAGNOSIS — G62.9 NEUROPATHY: ICD-10-CM

## 2024-05-13 DIAGNOSIS — E11.9 TYPE 2 DIABETES MELLITUS WITHOUT COMPLICATION, WITHOUT LONG-TERM CURRENT USE OF INSULIN (HCC): ICD-10-CM

## 2024-05-13 DIAGNOSIS — M19.91 PRIMARY OSTEOARTHRITIS, UNSPECIFIED SITE: ICD-10-CM

## 2024-05-13 DIAGNOSIS — E29.1 HYPOGONADISM IN MALE: ICD-10-CM

## 2024-05-13 DIAGNOSIS — R53.83 FATIGUE, UNSPECIFIED TYPE: ICD-10-CM

## 2024-05-13 DIAGNOSIS — Z12.11 COLON CANCER SCREENING: ICD-10-CM

## 2024-05-13 DIAGNOSIS — I10 ESSENTIAL HYPERTENSION: Primary | ICD-10-CM

## 2024-05-13 DIAGNOSIS — E53.8 B12 DEFICIENCY: ICD-10-CM

## 2024-05-13 LAB — TESTOST SERPL-MCNC: 130.3 NG/DL (ref 193–740)

## 2024-05-13 RX ORDER — DEXAMETHASONE SODIUM PHOSPHATE 10 MG/ML
10 INJECTION INTRAMUSCULAR; INTRAVENOUS ONCE
Status: COMPLETED | OUTPATIENT
Start: 2024-05-13 | End: 2024-05-13

## 2024-05-13 RX ORDER — CYANOCOBALAMIN 1000 UG/ML
1000 INJECTION, SOLUTION INTRAMUSCULAR; SUBCUTANEOUS ONCE
Status: COMPLETED | OUTPATIENT
Start: 2024-05-13 | End: 2024-05-13

## 2024-05-13 RX ORDER — KETOROLAC TROMETHAMINE 30 MG/ML
60 INJECTION, SOLUTION INTRAMUSCULAR; INTRAVENOUS ONCE
Status: COMPLETED | OUTPATIENT
Start: 2024-05-13 | End: 2024-05-13

## 2024-05-13 RX ORDER — MECLIZINE HYDROCHLORIDE 25 MG/1
TABLET ORAL
COMMUNITY
Start: 2024-04-29

## 2024-05-13 RX ADMIN — KETOROLAC TROMETHAMINE 60 MG: 30 INJECTION, SOLUTION INTRAMUSCULAR; INTRAVENOUS at 15:48

## 2024-05-13 RX ADMIN — DEXAMETHASONE SODIUM PHOSPHATE 10 MG: 10 INJECTION INTRAMUSCULAR; INTRAVENOUS at 15:49

## 2024-05-13 RX ADMIN — CYANOCOBALAMIN 1000 MCG: 1000 INJECTION, SOLUTION INTRAMUSCULAR; SUBCUTANEOUS at 15:47

## 2024-05-13 SDOH — ECONOMIC STABILITY: INCOME INSECURITY: HOW HARD IS IT FOR YOU TO PAY FOR THE VERY BASICS LIKE FOOD, HOUSING, MEDICAL CARE, AND HEATING?: SOMEWHAT HARD

## 2024-05-13 SDOH — ECONOMIC STABILITY: FOOD INSECURITY: WITHIN THE PAST 12 MONTHS, YOU WORRIED THAT YOUR FOOD WOULD RUN OUT BEFORE YOU GOT MONEY TO BUY MORE.: SOMETIMES TRUE

## 2024-05-13 SDOH — ECONOMIC STABILITY: FOOD INSECURITY: WITHIN THE PAST 12 MONTHS, THE FOOD YOU BOUGHT JUST DIDN'T LAST AND YOU DIDN'T HAVE MONEY TO GET MORE.: SOMETIMES TRUE

## 2024-05-13 ASSESSMENT — PATIENT HEALTH QUESTIONNAIRE - PHQ9
2. FEELING DOWN, DEPRESSED OR HOPELESS: NOT AT ALL
SUM OF ALL RESPONSES TO PHQ QUESTIONS 1-9: 0
4. FEELING TIRED OR HAVING LITTLE ENERGY: NOT AT ALL
3. TROUBLE FALLING OR STAYING ASLEEP: NOT AT ALL
10. IF YOU CHECKED OFF ANY PROBLEMS, HOW DIFFICULT HAVE THESE PROBLEMS MADE IT FOR YOU TO DO YOUR WORK, TAKE CARE OF THINGS AT HOME, OR GET ALONG WITH OTHER PEOPLE: NOT DIFFICULT AT ALL
9. THOUGHTS THAT YOU WOULD BE BETTER OFF DEAD, OR OF HURTING YOURSELF: NOT AT ALL
1. LITTLE INTEREST OR PLEASURE IN DOING THINGS: NOT AT ALL
SUM OF ALL RESPONSES TO PHQ QUESTIONS 1-9: 0
SUM OF ALL RESPONSES TO PHQ9 QUESTIONS 1 & 2: 0
6. FEELING BAD ABOUT YOURSELF - OR THAT YOU ARE A FAILURE OR HAVE LET YOURSELF OR YOUR FAMILY DOWN: NOT AT ALL
7. TROUBLE CONCENTRATING ON THINGS, SUCH AS READING THE NEWSPAPER OR WATCHING TELEVISION: NOT AT ALL
SUM OF ALL RESPONSES TO PHQ QUESTIONS 1-9: 0
5. POOR APPETITE OR OVEREATING: NOT AT ALL
SUM OF ALL RESPONSES TO PHQ QUESTIONS 1-9: 0
8. MOVING OR SPEAKING SO SLOWLY THAT OTHER PEOPLE COULD HAVE NOTICED. OR THE OPPOSITE, BEING SO FIGETY OR RESTLESS THAT YOU HAVE BEEN MOVING AROUND A LOT MORE THAN USUAL: NOT AT ALL

## 2024-05-13 ASSESSMENT — ENCOUNTER SYMPTOMS
VOMITING: 0
VOICE CHANGE: 0
COLOR CHANGE: 0
COUGH: 0
BACK PAIN: 0
NAUSEA: 0
RHINORRHEA: 0
PHOTOPHOBIA: 0

## 2024-05-13 NOTE — PATIENT INSTRUCTIONS
Schedule dermatology appointment.   Lab to check testosterone today.   Begin testosterone injections every 2 weeks.   B12 injection today.   Decadron/toradol injection today for joint pain.   Cologuard- complete within 1-2 weeks of receiving.   Follow-up in 3 months or sooner if needed.

## 2024-05-13 NOTE — PROGRESS NOTES
[facility]  [address]  [address]  [place] [address]  Dept: [phone]  Dept Fax: [phone]  Loc: [phone]    Cristina Menchaca is a 58 y.o. male who presents today for his medical conditions/complaints as noted below.  Cristina Menchaca is c/o of 6 Month Follow-Up (Pt in office for 6 month follow up - stated that he has been having issues with vertigo)        HPI:     HPI   Chief Complaint   Patient presents with    6 Month Follow-Up     Pt in office for 6 month follow up - stated that he has been having issues with vertigo     Patient presents today for follow-up hypertension, neuropathy, diabetes. Blood pressure is well-controlled. Labs reviewed today; A1C is 6.3; all other labs stable. He is currently taking gabapentin for neuropathy. UDS up to date.     He complains of episode of vertigo 2 weeks ago. He states he had EMS to take him to ER at [place]. He was treated with abx for pneumonia and symptoms have mostly resolved. He has followed up with his pulmonologist since this visit. He was prescribed chantix and is planning to start it.     He  reports that he has been smoking cigarettes. He started smoking about 42 years ago. He has a 42.5 pack-year smoking history. He has never used smokeless tobacco.    Testosterone low; he has not yet started testosterone.     He complains of joint pain. He sees rheumatology.     Past Medical History:   Diagnosis Date    Arthritis     Asthma     Bronchitis     CAD (coronary artery disease)     Cerebral artery occlusion with cerebral infarction ([facility])     Chronic obstructive pulmonary disease ([facility]) 2/3/2021    Emphysema of lung ([facility])     Follicular acne     as stated per pt. \"it comes and goes. Nothing helps it. I've been trying to tell doctors i need an antiobiotic.    Hx of blood clots     DVT'S (as stated per pt)    Hypertension     Hypoglycemia     Knee joint pain     Other and unspecified hyperlipidemia     PVD

## 2024-05-16 RX ORDER — TESTOSTERONE CYPIONATE 200 MG/ML
200 INJECTION, SOLUTION INTRAMUSCULAR
Qty: 30 ML | Refills: 0 | Status: SHIPPED | OUTPATIENT
Start: 2024-05-16 | End: 2025-06-27

## 2024-06-05 DIAGNOSIS — M48.00 SPINAL STENOSIS, UNSPECIFIED SPINAL REGION: ICD-10-CM

## 2024-06-05 DIAGNOSIS — G62.9 NEUROPATHY: ICD-10-CM

## 2024-06-05 RX ORDER — GABAPENTIN 300 MG/1
CAPSULE ORAL
Qty: 120 CAPSULE | Refills: 0 | Status: SHIPPED | OUTPATIENT
Start: 2024-06-05 | End: 2024-07-05

## 2024-06-08 LAB — NONINV COLON CA DNA+OCC BLD SCRN STL QL: NEGATIVE

## 2024-06-17 DIAGNOSIS — E29.1 HYPOGONADISM MALE: ICD-10-CM

## 2024-06-17 RX ORDER — TESTOSTERONE CYPIONATE 200 MG/ML
200 INJECTION, SOLUTION INTRAMUSCULAR
Qty: 30 ML | Refills: 0 | Status: SHIPPED | OUTPATIENT
Start: 2024-06-17 | End: 2025-07-29

## 2024-06-17 NOTE — TELEPHONE ENCOUNTER
Cristina Menchaca called to request a refill on his medication.      Last office visit : 5/13/2024   Next office visit : 8/12/2024     Last UDS:   Benzodiazepine Screen, Urine   Date Value Ref Range Status   01/02/2024 -  Final     Buprenorphine Urine   Date Value Ref Range Status   01/02/2024 -  Final     Cocaine Metabolite Screen, Urine   Date Value Ref Range Status   01/02/2024 -  Final     MDMA, Urine   Date Value Ref Range Status   01/02/2024 -  Final     Oxycodone Screen, Ur   Date Value Ref Range Status   01/02/2024 -  Final     Propoxyphene Screen, Urine   Date Value Ref Range Status   01/02/2024 -  Final     THC Screen, Urine   Date Value Ref Range Status   01/02/2024 +  Final     Tricyclic Antidepressants, Urine   Date Value Ref Range Status   01/02/2024 +  Final       Last Paulie: 6/17/24  Medication Contract: 8/7/23   Last Fill: 5/16/24    Requested Prescriptions     Pending Prescriptions Disp Refills    testosterone cypionate (DEPOTESTOTERONE CYPIONATE) 200 MG/ML injection 30 mL 0     Sig: Inject 1 mL into the skin every 14 days for 30 doses. Max Daily Amount: 200 mg                   Please approve or refuse this medication.   Thalia Maldonado LPN

## 2024-07-08 DIAGNOSIS — M48.00 SPINAL STENOSIS, UNSPECIFIED SPINAL REGION: ICD-10-CM

## 2024-07-08 DIAGNOSIS — G62.9 NEUROPATHY: ICD-10-CM

## 2024-07-12 NOTE — TELEPHONE ENCOUNTER
Cristina Menchaca called to request a refill on his medication.      Last office visit : 5/13/2024   Next office visit : 8/12/2024     Last UDS:   Benzodiazepine Screen, Urine   Date Value Ref Range Status   01/02/2024 -  Final     Buprenorphine Urine   Date Value Ref Range Status   01/02/2024 -  Final     Cocaine Metabolite Screen, Urine   Date Value Ref Range Status   01/02/2024 -  Final     MDMA, Urine   Date Value Ref Range Status   01/02/2024 -  Final     Oxycodone Screen, Ur   Date Value Ref Range Status   01/02/2024 -  Final     Propoxyphene Screen, Urine   Date Value Ref Range Status   01/02/2024 -  Final     THC Screen, Urine   Date Value Ref Range Status   01/02/2024 +  Final     Tricyclic Antidepressants, Urine   Date Value Ref Range Status   01/02/2024 +  Final       Last Paulie: 07.12.2024  Medication Contract: 08.07.2023   Last Fill: 06.05.2024    Requested Prescriptions     Pending Prescriptions Disp Refills    losartan (COZAAR) 50 MG tablet [Pharmacy Med Name: LOSARTAN POTASSIUM 50 MG TAB] 60 tablet 3     Sig: TAKE ONE TABLET BY MOUTH 2 TIMES A DAY IN THE MORNING AND EVENING    gabapentin (NEURONTIN) 300 MG capsule [Pharmacy Med Name: GABAPENTIN 300 MG CAPSULE] 120 capsule 0     Sig: TAKE ONE CAPSULE BY MOUTH FOUR TIMES A DAY         Please approve or refuse this medication.   Valerie Dalton MA

## 2024-07-15 RX ORDER — LOSARTAN POTASSIUM 50 MG/1
TABLET ORAL
Qty: 60 TABLET | Refills: 3 | Status: SHIPPED | OUTPATIENT
Start: 2024-07-15

## 2024-07-15 RX ORDER — GABAPENTIN 300 MG/1
CAPSULE ORAL
Qty: 120 CAPSULE | Refills: 0 | Status: SHIPPED | OUTPATIENT
Start: 2024-07-15 | End: 2024-08-14

## 2024-07-18 DIAGNOSIS — E29.1 HYPOGONADISM MALE: ICD-10-CM

## 2024-07-22 RX ORDER — TESTOSTERONE CYPIONATE 200 MG/ML
INJECTION, SOLUTION INTRAMUSCULAR
Qty: 30 ML | Refills: 0 | Status: SHIPPED | OUTPATIENT
Start: 2024-07-22 | End: 2024-08-21

## 2024-07-22 NOTE — TELEPHONE ENCOUNTER
Cristina Menchaca called to request a refill on his medication.      Last office visit : 5/13/2024   Next office visit : 8/12/2024     Requested Prescriptions     Pending Prescriptions Disp Refills    testosterone cypionate (DEPOTESTOTERONE CYPIONATE) 200 MG/ML injection [Pharmacy Med Name: testosterone cypionate 200 mg/mL intramuscular oil] 30 mL 0     Sig: Inject 1 mL into the skin every 14 days            Nancy Best MA

## 2024-08-07 DIAGNOSIS — I10 ESSENTIAL HYPERTENSION: ICD-10-CM

## 2024-08-07 NOTE — TELEPHONE ENCOUNTER
Cristina Menchaca called to request a refill on his medication.      Last office visit : 5/13/2024   Next office visit : 8/12/2024     Requested Prescriptions     Pending Prescriptions Disp Refills    metoprolol succinate (TOPROL XL) 25 MG extended release tablet [Pharmacy Med Name: metoprolol succinate ER 25 mg tablet,extended release 24 hr] 90 tablet 3     Sig: TAKE ONE TABLET BY MOUTH EVERY DAY            Heather Jules MA

## 2024-08-08 DIAGNOSIS — E29.1 HYPOGONADISM MALE: ICD-10-CM

## 2024-08-08 DIAGNOSIS — R73.09 ABNORMAL GLUCOSE: ICD-10-CM

## 2024-08-08 DIAGNOSIS — E11.9 TYPE 2 DIABETES MELLITUS WITHOUT COMPLICATION, WITHOUT LONG-TERM CURRENT USE OF INSULIN (HCC): Primary | ICD-10-CM

## 2024-08-08 RX ORDER — METOPROLOL SUCCINATE 25 MG/1
TABLET, EXTENDED RELEASE ORAL
Qty: 90 TABLET | Refills: 3 | Status: SHIPPED | OUTPATIENT
Start: 2024-08-08

## 2024-08-09 DIAGNOSIS — R73.09 ABNORMAL GLUCOSE: ICD-10-CM

## 2024-08-09 DIAGNOSIS — E29.1 HYPOGONADISM MALE: ICD-10-CM

## 2024-08-09 DIAGNOSIS — E11.9 TYPE 2 DIABETES MELLITUS WITHOUT COMPLICATION, WITHOUT LONG-TERM CURRENT USE OF INSULIN (HCC): ICD-10-CM

## 2024-08-09 LAB
ALBUMIN SERPL-MCNC: 4 G/DL (ref 3.5–5.2)
ALP SERPL-CCNC: 95 U/L (ref 40–130)
ALT SERPL-CCNC: 13 U/L (ref 5–41)
ANION GAP SERPL CALCULATED.3IONS-SCNC: 13 MMOL/L (ref 7–19)
AST SERPL-CCNC: 14 U/L (ref 5–40)
BASOPHILS # BLD: 0.2 K/UL (ref 0–0.2)
BASOPHILS NFR BLD: 1.3 % (ref 0–1)
BILIRUB SERPL-MCNC: 0.3 MG/DL (ref 0.2–1.2)
BUN SERPL-MCNC: 20 MG/DL (ref 8–23)
CALCIUM SERPL-MCNC: 9.9 MG/DL (ref 8.8–10.2)
CHLORIDE SERPL-SCNC: 103 MMOL/L (ref 98–111)
CO2 SERPL-SCNC: 22 MMOL/L (ref 22–29)
CREAT SERPL-MCNC: 1.1 MG/DL (ref 0.5–1.2)
CRP SERPL HS-MCNC: 3.96 MG/DL (ref 0–0.5)
EOSINOPHIL # BLD: 1.1 K/UL (ref 0–0.6)
EOSINOPHIL NFR BLD: 7.4 % (ref 0–5)
ERYTHROCYTE [DISTWIDTH] IN BLOOD BY AUTOMATED COUNT: 14.3 % (ref 11.5–14.5)
ERYTHROCYTE [SEDIMENTATION RATE] IN BLOOD BY WESTERGREN METHOD: 34 MM/HR (ref 0–15)
GLUCOSE SERPL-MCNC: 135 MG/DL (ref 74–109)
HBA1C MFR BLD: 5.9 % (ref 4–6)
HCT VFR BLD AUTO: 44.2 % (ref 42–52)
HGB BLD-MCNC: 13.7 G/DL (ref 14–18)
IMM GRANULOCYTES # BLD: 0.1 K/UL
LYMPHOCYTES # BLD: 2.5 K/UL (ref 1.1–4.5)
LYMPHOCYTES NFR BLD: 16.6 % (ref 20–40)
MCH RBC QN AUTO: 29.1 PG (ref 27–31)
MCHC RBC AUTO-ENTMCNC: 31 G/DL (ref 33–37)
MCV RBC AUTO: 93.8 FL (ref 80–94)
MONOCYTES # BLD: 1.1 K/UL (ref 0–0.9)
MONOCYTES NFR BLD: 7 % (ref 0–10)
NEUTROPHILS # BLD: 10.2 K/UL (ref 1.5–7.5)
NEUTS SEG NFR BLD: 67.2 % (ref 50–65)
PLATELET # BLD AUTO: 399 K/UL (ref 130–400)
PMV BLD AUTO: 10 FL (ref 9.4–12.4)
POTASSIUM SERPL-SCNC: 4.2 MMOL/L (ref 3.5–5)
PROT SERPL-MCNC: 7.2 G/DL (ref 6.6–8.7)
RBC # BLD AUTO: 4.71 M/UL (ref 4.7–6.1)
SODIUM SERPL-SCNC: 138 MMOL/L (ref 136–145)
TESTOST SERPL-MCNC: 968.2 NG/DL (ref 193–740)
WBC # BLD AUTO: 15.2 K/UL (ref 4.8–10.8)

## 2024-08-12 ENCOUNTER — OFFICE VISIT (OUTPATIENT)
Dept: PRIMARY CARE CLINIC | Age: 59
End: 2024-08-12
Payer: MEDICARE

## 2024-08-12 VITALS
DIASTOLIC BLOOD PRESSURE: 82 MMHG | WEIGHT: 284.6 LBS | HEART RATE: 74 BPM | BODY MASS INDEX: 44.67 KG/M2 | SYSTOLIC BLOOD PRESSURE: 128 MMHG | TEMPERATURE: 97.8 F | HEIGHT: 67 IN | OXYGEN SATURATION: 97 %

## 2024-08-12 DIAGNOSIS — Z87.891 PERSONAL HISTORY OF TOBACCO USE: ICD-10-CM

## 2024-08-12 DIAGNOSIS — G62.9 NEUROPATHY: ICD-10-CM

## 2024-08-12 DIAGNOSIS — Z00.00 MEDICARE ANNUAL WELLNESS VISIT, SUBSEQUENT: Primary | ICD-10-CM

## 2024-08-12 DIAGNOSIS — I10 ESSENTIAL HYPERTENSION: ICD-10-CM

## 2024-08-12 DIAGNOSIS — I25.118 CORONARY ARTERY DISEASE OF NATIVE ARTERY OF NATIVE HEART WITH STABLE ANGINA PECTORIS (HCC): ICD-10-CM

## 2024-08-12 DIAGNOSIS — Z72.0 TOBACCO ABUSE DISORDER: ICD-10-CM

## 2024-08-12 DIAGNOSIS — E11.9 TYPE 2 DIABETES MELLITUS WITHOUT COMPLICATION, WITHOUT LONG-TERM CURRENT USE OF INSULIN (HCC): ICD-10-CM

## 2024-08-12 DIAGNOSIS — E29.1 HYPOGONADISM MALE: ICD-10-CM

## 2024-08-12 PROCEDURE — G0296 VISIT TO DETERM LDCT ELIG: HCPCS | Performed by: NURSE PRACTITIONER

## 2024-08-12 PROCEDURE — 3079F DIAST BP 80-89 MM HG: CPT | Performed by: NURSE PRACTITIONER

## 2024-08-12 PROCEDURE — 3074F SYST BP LT 130 MM HG: CPT | Performed by: NURSE PRACTITIONER

## 2024-08-12 PROCEDURE — G0439 PPPS, SUBSEQ VISIT: HCPCS | Performed by: NURSE PRACTITIONER

## 2024-08-12 PROCEDURE — 3044F HG A1C LEVEL LT 7.0%: CPT | Performed by: NURSE PRACTITIONER

## 2024-08-12 ASSESSMENT — PATIENT HEALTH QUESTIONNAIRE - PHQ9
8. MOVING OR SPEAKING SO SLOWLY THAT OTHER PEOPLE COULD HAVE NOTICED. OR THE OPPOSITE, BEING SO FIGETY OR RESTLESS THAT YOU HAVE BEEN MOVING AROUND A LOT MORE THAN USUAL: NOT AT ALL
9. THOUGHTS THAT YOU WOULD BE BETTER OFF DEAD, OR OF HURTING YOURSELF: NOT AT ALL
2. FEELING DOWN, DEPRESSED OR HOPELESS: NOT AT ALL
3. TROUBLE FALLING OR STAYING ASLEEP: NOT AT ALL
10. IF YOU CHECKED OFF ANY PROBLEMS, HOW DIFFICULT HAVE THESE PROBLEMS MADE IT FOR YOU TO DO YOUR WORK, TAKE CARE OF THINGS AT HOME, OR GET ALONG WITH OTHER PEOPLE: SOMEWHAT DIFFICULT
SUM OF ALL RESPONSES TO PHQ9 QUESTIONS 1 & 2: 1
1. LITTLE INTEREST OR PLEASURE IN DOING THINGS: SEVERAL DAYS
SUM OF ALL RESPONSES TO PHQ QUESTIONS 1-9: 1
5. POOR APPETITE OR OVEREATING: NOT AT ALL
6. FEELING BAD ABOUT YOURSELF - OR THAT YOU ARE A FAILURE OR HAVE LET YOURSELF OR YOUR FAMILY DOWN: NOT AT ALL
SUM OF ALL RESPONSES TO PHQ QUESTIONS 1-9: 1
4. FEELING TIRED OR HAVING LITTLE ENERGY: NOT AT ALL
7. TROUBLE CONCENTRATING ON THINGS, SUCH AS READING THE NEWSPAPER OR WATCHING TELEVISION: NOT AT ALL

## 2024-08-12 NOTE — PROGRESS NOTES
Medicare Annual Wellness Visit    Cristina Menchaca is here for Follow-up and Medication Refill (Controlled Substance)    Assessment & Plan   Medicare annual wellness visit, subsequent  -     Vitamin D 25 Hydroxy; Future  -     Lipid Panel; Future  -     Hemoglobin A1C; Future  -     Comprehensive Metabolic Panel; Future  -     TSH; Future  -     CBC with Auto Differential; Future  -     Microalbumin / Creatinine Urine Ratio; Future  -     Hepatitis C Antibody; Future  -     HIV Rapid 1&2; Future  Personal history of tobacco use  -     NC VISIT TO DISCUSS LUNG CA SCREEN W LDCT  -     Vitamin D 25 Hydroxy; Future  -     Lipid Panel; Future  -     Hemoglobin A1C; Future  -     Comprehensive Metabolic Panel; Future  -     TSH; Future  -     CBC with Auto Differential; Future  -     Microalbumin / Creatinine Urine Ratio; Future  -     Hepatitis C Antibody; Future  -     HIV Rapid 1&2; Future  Essential hypertension  -     Vitamin D 25 Hydroxy; Future  -     Lipid Panel; Future  -     Hemoglobin A1C; Future  -     Comprehensive Metabolic Panel; Future  -     TSH; Future  -     CBC with Auto Differential; Future  -     Microalbumin / Creatinine Urine Ratio; Future  -     Hepatitis C Antibody; Future  -     HIV Rapid 1&2; Future  Neuropathy  -     Vitamin D 25 Hydroxy; Future  -     Lipid Panel; Future  -     Hemoglobin A1C; Future  -     Comprehensive Metabolic Panel; Future  -     TSH; Future  -     CBC with Auto Differential; Future  -     Microalbumin / Creatinine Urine Ratio; Future  -     Hepatitis C Antibody; Future  -     HIV Rapid 1&2; Future  Hypogonadism male  -     Vitamin D 25 Hydroxy; Future  -     Lipid Panel; Future  -     Hemoglobin A1C; Future  -     Comprehensive Metabolic Panel; Future  -     TSH; Future  -     CBC with Auto Differential; Future  -     Microalbumin / Creatinine Urine Ratio; Future  -     Hepatitis C Antibody; Future  -     HIV Rapid 1&2; Future  Coronary artery disease of native artery of

## 2024-08-19 DIAGNOSIS — F33.0 MAJOR DEPRESSIVE DISORDER, RECURRENT, MILD (HCC): ICD-10-CM

## 2024-08-19 DIAGNOSIS — M48.00 SPINAL STENOSIS, UNSPECIFIED SPINAL REGION: ICD-10-CM

## 2024-08-19 DIAGNOSIS — Z72.0 TOBACCO ABUSE DISORDER: ICD-10-CM

## 2024-08-19 DIAGNOSIS — G62.9 NEUROPATHY: ICD-10-CM

## 2024-08-19 DIAGNOSIS — E29.1 HYPOGONADISM MALE: ICD-10-CM

## 2024-08-19 RX ORDER — GABAPENTIN 300 MG/1
300 CAPSULE ORAL 4 TIMES DAILY
Qty: 120 CAPSULE | Refills: 0 | Status: SHIPPED | OUTPATIENT
Start: 2024-08-19 | End: 2024-09-18

## 2024-08-19 RX ORDER — BUPROPION HYDROCHLORIDE 150 MG/1
TABLET ORAL
Qty: 90 TABLET | Refills: 2 | Status: SHIPPED | OUTPATIENT
Start: 2024-08-19

## 2024-08-19 RX ORDER — TESTOSTERONE CYPIONATE 200 MG/ML
200 INJECTION, SOLUTION INTRAMUSCULAR
Qty: 30 ML | Refills: 0 | Status: SHIPPED | OUTPATIENT
Start: 2024-08-19 | End: 2024-09-18

## 2024-08-19 NOTE — TELEPHONE ENCOUNTER
Cristina Menchaca called to request a refill on his medication.  Patient is not happy he stated his medication refills was suppose to be refilled at his office visit on 8/12/24.   Last office visit : 8/12/2024   Next office visit : 11/12/2024     Last Paulie: 6/17/24  Medication Contract: 8/7/23 needs updated    Last UDS: 1/2/24   Last Rx: 7/15/24    Benzodiazepine Screen, Urine   Date Value Ref Range Status   01/02/2024 -  Final     Buprenorphine Urine   Date Value Ref Range Status   01/02/2024 -  Final     Cocaine Metabolite Screen, Urine   Date Value Ref Range Status   01/02/2024 -  Final     Oxycodone Screen, Ur   Date Value Ref Range Status   01/02/2024 -  Final     Propoxyphene Screen, Urine   Date Value Ref Range Status   01/02/2024 -  Final     THC Screen, Urine   Date Value Ref Range Status   01/02/2024 +  Final     Tricyclic Antidepressants, Urine   Date Value Ref Range Status   01/02/2024 +  Final           Requested Prescriptions     Pending Prescriptions Disp Refills    buPROPion (WELLBUTRIN XL) 150 MG extended release tablet [Pharmacy Med Name: Bupropion Hcl Xl 150 Mg Tablet] 90 tablet 2     Sig: TAKE ONE TABLET BY MOUTH EVERY MORNING    gabapentin (NEURONTIN) 300 MG capsule [Pharmacy Med Name: gabapentin 300 mg capsule] 120 capsule 0     Sig: TAKE ONE CAPSULE BY MOUTH FOUR TIMES A DAY    testosterone cypionate (DEPOTESTOTERONE CYPIONATE) 200 MG/ML injection [Pharmacy Med Name: testosterone cypionate 200 mg/mL intramuscular oil] 30 mL 0     Sig: INJECT 1 ML INTO THE SKIN EVERY 14 DAYS         Please approve or refuse this medication.   David Polo MA

## 2024-09-18 DIAGNOSIS — M48.00 SPINAL STENOSIS, UNSPECIFIED SPINAL REGION: ICD-10-CM

## 2024-09-18 DIAGNOSIS — G62.9 NEUROPATHY: ICD-10-CM

## 2024-09-18 DIAGNOSIS — E29.1 HYPOGONADISM MALE: ICD-10-CM

## 2024-09-19 RX ORDER — GABAPENTIN 300 MG/1
CAPSULE ORAL
Qty: 120 CAPSULE | Refills: 0 | Status: SHIPPED | OUTPATIENT
Start: 2024-09-19 | End: 2024-10-19

## 2024-09-19 RX ORDER — TESTOSTERONE CYPIONATE 200 MG/ML
INJECTION, SOLUTION INTRAMUSCULAR
Qty: 30 ML | Refills: 0 | Status: SHIPPED | OUTPATIENT
Start: 2024-09-19 | End: 2024-10-19

## 2024-10-07 DIAGNOSIS — I10 ESSENTIAL HYPERTENSION: ICD-10-CM

## 2024-10-07 RX ORDER — AMLODIPINE BESYLATE 5 MG/1
5 TABLET ORAL DAILY
Qty: 90 TABLET | Refills: 1 | Status: SHIPPED | OUTPATIENT
Start: 2024-10-07

## 2024-10-07 NOTE — TELEPHONE ENCOUNTER
Cristina SUNIL Alanize called to request a refill on his medication.      Last office visit : 8/12/2024   Next office visit : 11/12/2024     Requested Prescriptions     Pending Prescriptions Disp Refills    amLODIPine (NORVASC) 5 MG tablet [Pharmacy Med Name: amlodipine 5 mg tablet] 90 tablet 1     Sig: TAKE ONE TABLET BY MOUTH EVERY DAY            Thalia Maldonado LPN

## 2024-10-21 ENCOUNTER — TELEPHONE (OUTPATIENT)
Dept: PRIMARY CARE CLINIC | Age: 59
End: 2024-10-21

## 2024-10-21 RX ORDER — PREDNISONE 20 MG/1
20 TABLET ORAL 2 TIMES DAILY
Qty: 10 TABLET | Refills: 0 | Status: SHIPPED | OUTPATIENT
Start: 2024-10-21 | End: 2024-10-26

## 2024-10-21 NOTE — TELEPHONE ENCOUNTER
Patient called in about having issues with RA flaring up. Plaquenil isn't giving much relief. Stated that he is unhappy with Dr Cano office and would like to see a different rheumatologist. Stated that he has been going since the spring with no relief or help from Dr Cano office and wanting to know if there is anything that you can do to help with pain until he is able to get into new rheumatologist. Wanting referral sent to Dr Medina. Please advise

## 2024-10-22 DIAGNOSIS — G62.9 NEUROPATHY: ICD-10-CM

## 2024-10-22 DIAGNOSIS — M48.00 SPINAL STENOSIS, UNSPECIFIED SPINAL REGION: ICD-10-CM

## 2024-10-22 DIAGNOSIS — E29.1 HYPOGONADISM MALE: ICD-10-CM

## 2024-10-23 NOTE — TELEPHONE ENCOUNTER
Cristina Menchaca called to request a refill on his medication.      Last office visit : 8/12/2024   Next office visit : 11/12/2024     Last UDS:   Benzodiazepine Screen, Urine   Date Value Ref Range Status   01/02/2024 -  Final     Buprenorphine Urine   Date Value Ref Range Status   01/02/2024 -  Final     Cocaine Metabolite Screen, Urine   Date Value Ref Range Status   01/02/2024 -  Final     Oxycodone Screen, Ur   Date Value Ref Range Status   01/02/2024 -  Final     Propoxyphene Screen, Urine   Date Value Ref Range Status   01/02/2024 -  Final     THC Screen, Urine   Date Value Ref Range Status   01/02/2024 +  Final     Tricyclic Antidepressants, Urine   Date Value Ref Range Status   01/02/2024 +  Final       Last Paulie: 09.19.2024  Medication Contract: 08.07.2023   Last Fill: 09.19.2024    Requested Prescriptions     Pending Prescriptions Disp Refills    testosterone cypionate (DEPOTESTOTERONE CYPIONATE) 200 MG/ML injection [Pharmacy Med Name: testosterone cypionate 200 mg/mL intramuscular oil] 2 mL 0     Sig: INJECT 1 ML INTRAMUSCULARLY EVERY 14 DAYS    gabapentin (NEURONTIN) 300 MG capsule [Pharmacy Med Name: gabapentin 300 mg capsule] 120 capsule 0     Sig: TAKE 1 Capsule BY MOUTH FOUR TIMES DAILY         Please approve or refuse this medication.   Valerie Dalton MA

## 2024-10-24 RX ORDER — GABAPENTIN 300 MG/1
CAPSULE ORAL
Qty: 120 CAPSULE | Refills: 0 | Status: SHIPPED | OUTPATIENT
Start: 2024-10-24 | End: 2024-11-23

## 2024-10-24 RX ORDER — TESTOSTERONE CYPIONATE 200 MG/ML
INJECTION, SOLUTION INTRAMUSCULAR
Qty: 2 ML | Refills: 0 | Status: SHIPPED | OUTPATIENT
Start: 2024-10-24 | End: 2024-11-23

## 2024-10-28 DIAGNOSIS — M25.50 ARTHRALGIA, UNSPECIFIED JOINT: ICD-10-CM

## 2024-10-28 DIAGNOSIS — R53.82 CHRONIC FATIGUE: Primary | ICD-10-CM

## 2024-11-08 DIAGNOSIS — I25.118 CORONARY ARTERY DISEASE OF NATIVE ARTERY OF NATIVE HEART WITH STABLE ANGINA PECTORIS (HCC): ICD-10-CM

## 2024-11-08 DIAGNOSIS — E11.9 TYPE 2 DIABETES MELLITUS WITHOUT COMPLICATION, WITHOUT LONG-TERM CURRENT USE OF INSULIN (HCC): ICD-10-CM

## 2024-11-08 DIAGNOSIS — Z72.0 TOBACCO ABUSE DISORDER: ICD-10-CM

## 2024-11-08 DIAGNOSIS — E29.1 HYPOGONADISM MALE: ICD-10-CM

## 2024-11-08 DIAGNOSIS — Z87.891 PERSONAL HISTORY OF TOBACCO USE: ICD-10-CM

## 2024-11-08 DIAGNOSIS — G62.9 NEUROPATHY: ICD-10-CM

## 2024-11-08 DIAGNOSIS — I10 ESSENTIAL HYPERTENSION: ICD-10-CM

## 2024-11-08 DIAGNOSIS — Z00.00 MEDICARE ANNUAL WELLNESS VISIT, SUBSEQUENT: ICD-10-CM

## 2024-11-08 LAB
25(OH)D3 SERPL-MCNC: 11.4 NG/ML
ALBUMIN SERPL-MCNC: 4 G/DL (ref 3.5–5.2)
ALP SERPL-CCNC: 135 U/L (ref 40–129)
ALT SERPL-CCNC: 16 U/L (ref 5–41)
ANION GAP SERPL CALCULATED.3IONS-SCNC: 16 MMOL/L (ref 7–19)
AST SERPL-CCNC: 11 U/L (ref 5–40)
BASOPHILS # BLD: 0.1 K/UL (ref 0–0.2)
BASOPHILS NFR BLD: 0.5 % (ref 0–1)
BILIRUB SERPL-MCNC: 0.3 MG/DL (ref 0.2–1.2)
BUN SERPL-MCNC: 16 MG/DL (ref 8–23)
CALCIUM SERPL-MCNC: 9.9 MG/DL (ref 8.8–10.2)
CHLORIDE SERPL-SCNC: 97 MMOL/L (ref 98–111)
CHOLEST SERPL-MCNC: 225 MG/DL (ref 0–199)
CO2 SERPL-SCNC: 21 MMOL/L (ref 22–29)
CREAT SERPL-MCNC: 1.3 MG/DL (ref 0.7–1.2)
CREAT UR-MCNC: 158.6 MG/DL (ref 39–259)
EOSINOPHIL # BLD: 0.1 K/UL (ref 0–0.6)
EOSINOPHIL NFR BLD: 0.5 % (ref 0–5)
ERYTHROCYTE [DISTWIDTH] IN BLOOD BY AUTOMATED COUNT: 15.9 % (ref 11.5–14.5)
GLUCOSE SERPL-MCNC: 187 MG/DL (ref 70–99)
HBA1C MFR BLD: 5.9 % (ref 4–5.6)
HCT VFR BLD AUTO: 48.6 % (ref 42–52)
HCV AB SERPL QL IA: NORMAL
HDLC SERPL-MCNC: 42 MG/DL (ref 40–60)
HGB BLD-MCNC: 15.5 G/DL (ref 14–18)
HIV-1 P24 AG: NORMAL
HIV1+2 AB SERPLBLD QL IA.RAPID: NORMAL
IMM GRANULOCYTES # BLD: 0.2 K/UL
LDLC SERPL CALC-MCNC: 159 MG/DL
LYMPHOCYTES # BLD: 1.6 K/UL (ref 1.1–4.5)
LYMPHOCYTES NFR BLD: 8.9 % (ref 20–40)
MCH RBC QN AUTO: 29.8 PG (ref 27–31)
MCHC RBC AUTO-ENTMCNC: 31.9 G/DL (ref 33–37)
MCV RBC AUTO: 93.5 FL (ref 80–94)
MICROALBUMIN UR-MCNC: 13.2 MG/DL (ref 0–1.99)
MICROALBUMIN/CREAT UR-RTO: 83.2 MG/G
MONOCYTES # BLD: 0.7 K/UL (ref 0–0.9)
MONOCYTES NFR BLD: 3.7 % (ref 0–10)
NEUTROPHILS # BLD: 15.8 K/UL (ref 1.5–7.5)
NEUTS SEG NFR BLD: 85.3 % (ref 50–65)
PLATELET # BLD AUTO: 338 K/UL (ref 130–400)
PMV BLD AUTO: 9.8 FL (ref 9.4–12.4)
POTASSIUM SERPL-SCNC: 4.5 MMOL/L (ref 3.5–5)
PROT SERPL-MCNC: 7.5 G/DL (ref 6.4–8.3)
RBC # BLD AUTO: 5.2 M/UL (ref 4.7–6.1)
SODIUM SERPL-SCNC: 134 MMOL/L (ref 136–145)
TRIGL SERPL-MCNC: 121 MG/DL (ref 0–149)
TSH SERPL DL<=0.005 MIU/L-ACNC: 0.67 UIU/ML (ref 0.27–4.2)
WBC # BLD AUTO: 18.5 K/UL (ref 4.8–10.8)

## 2024-11-12 ENCOUNTER — OFFICE VISIT (OUTPATIENT)
Dept: PRIMARY CARE CLINIC | Age: 59
End: 2024-11-12

## 2024-11-12 VITALS
DIASTOLIC BLOOD PRESSURE: 86 MMHG | TEMPERATURE: 97.6 F | BODY MASS INDEX: 44.42 KG/M2 | WEIGHT: 283 LBS | OXYGEN SATURATION: 97 % | HEIGHT: 67 IN | HEART RATE: 74 BPM | SYSTOLIC BLOOD PRESSURE: 144 MMHG

## 2024-11-12 DIAGNOSIS — M48.00 SPINAL STENOSIS, UNSPECIFIED SPINAL REGION: ICD-10-CM

## 2024-11-12 DIAGNOSIS — M06.9 RHEUMATOID ARTHRITIS, INVOLVING UNSPECIFIED SITE, UNSPECIFIED WHETHER RHEUMATOID FACTOR PRESENT (HCC): ICD-10-CM

## 2024-11-12 DIAGNOSIS — G62.9 NEUROPATHY: Primary | ICD-10-CM

## 2024-11-12 DIAGNOSIS — I10 ESSENTIAL HYPERTENSION: ICD-10-CM

## 2024-11-12 DIAGNOSIS — I25.118 CORONARY ARTERY DISEASE OF NATIVE ARTERY OF NATIVE HEART WITH STABLE ANGINA PECTORIS (HCC): ICD-10-CM

## 2024-11-12 DIAGNOSIS — E11.9 TYPE 2 DIABETES MELLITUS WITHOUT COMPLICATION, WITHOUT LONG-TERM CURRENT USE OF INSULIN (HCC): ICD-10-CM

## 2024-11-12 DIAGNOSIS — Z72.0 TOBACCO ABUSE DISORDER: ICD-10-CM

## 2024-11-12 DIAGNOSIS — F33.0 MAJOR DEPRESSIVE DISORDER, RECURRENT, MILD (HCC): ICD-10-CM

## 2024-11-12 DIAGNOSIS — E78.49 OTHER HYPERLIPIDEMIA: ICD-10-CM

## 2024-11-12 DIAGNOSIS — E29.1 HYPOGONADISM MALE: ICD-10-CM

## 2024-11-12 RX ORDER — ETANERCEPT 25 MG
25 KIT SUBCUTANEOUS
Qty: 9 EACH | Refills: 2 | Status: SHIPPED | OUTPATIENT
Start: 2024-11-14

## 2024-11-12 RX ORDER — PREDNISONE 10 MG/1
10 TABLET ORAL 2 TIMES DAILY PRN
Qty: 30 TABLET | Refills: 0 | Status: SHIPPED | OUTPATIENT
Start: 2024-11-12 | End: 2024-11-22

## 2024-11-12 ASSESSMENT — ENCOUNTER SYMPTOMS
VOMITING: 0
SHORTNESS OF BREATH: 0
VOICE CHANGE: 0
BACK PAIN: 0
COUGH: 0
PHOTOPHOBIA: 0
NAUSEA: 0
COLOR CHANGE: 0
RHINORRHEA: 0

## 2024-11-12 NOTE — PATIENT INSTRUCTIONS
Follow-up in 3 months.   Stop smoking.   Prednisone 10 mg as needed for arthritis.   Will try Enbrel twice weekly injection for RA.

## 2024-11-12 NOTE — PROGRESS NOTES
[facility]  [address]  [address]  [place] [address]  Dept: [phone]  Dept Fax: [phone]  Loc: [phone]    Cristina Menchaca is a 59 y.o. male who presents today for his medical conditions/complaints as noted below.  Cristina Menchaca is c/o of 3 Month Follow-Up (Pt in office for 3 month follow up - mentions issues with rheumatoid)        HPI:     HPI   Chief Complaint   Patient presents with    3 Month Follow-Up     Pt in office for 3 month follow up - mentions issues with rheumatoid     Patient presents today for follow-up CAD, diabetes, COPD, depression, hypogonadism, neuropathy, hypertension. Blood pressure has been in normal range. Lipids elevated; he does not tolerate statin therapy. He is not interested in repatha at this time.     He is taking gabapentin for chronic back pain and neuropathy. He is also on testosterone. He was seeing rheumatology but quit going due to being dissatisfied with care. He was taking hydroxychloroquine but it was not effective. He has been diagnosed with RA and complains of pain in multiple joints. Prednisone often helps. He was given 20 mg BID for 5 days on 10/21/24. He is wanting to try a biologic; he has never been prescribed one before. He has struggled with this his whole life he states. He is having increasing difficulty with ADLs due to severe hand and joint pain.    He is due for annual lung cancer screening.     Vitamin D is low. WBC's chronically elevated.   He  reports that he has been smoking cigarettes. He started smoking about 42 years ago. He has a 43 pack-year smoking history. He has never used smokeless tobacco.      Lab Results   Component Value Date    VITD25 11.4 (L) 11/08/2024     Hemoglobin A1C   Date Value Ref Range Status   11/08/2024 5.9 (H) 4.0 - 5.6 % Final     Comment:     Comment:  Diagnosis of Diabetes: > or = 6.5%  Increased risk of diabetes (Prediabetes): 5.7-6.4%  Glycemic Control:

## 2024-11-18 DIAGNOSIS — M48.00 SPINAL STENOSIS, UNSPECIFIED SPINAL REGION: ICD-10-CM

## 2024-11-18 DIAGNOSIS — G62.9 NEUROPATHY: ICD-10-CM

## 2024-11-18 DIAGNOSIS — E29.1 HYPOGONADISM MALE: ICD-10-CM

## 2024-11-20 RX ORDER — GABAPENTIN 300 MG/1
CAPSULE ORAL
Qty: 120 CAPSULE | Refills: 0 | Status: SHIPPED | OUTPATIENT
Start: 2024-11-20 | End: 2024-12-20

## 2024-11-20 RX ORDER — TESTOSTERONE CYPIONATE 200 MG/ML
INJECTION, SOLUTION INTRAMUSCULAR
Qty: 2 ML | Refills: 0 | Status: SHIPPED | OUTPATIENT
Start: 2024-11-20 | End: 2024-12-20

## 2024-12-17 DIAGNOSIS — E29.1 HYPOGONADISM MALE: ICD-10-CM

## 2024-12-17 DIAGNOSIS — M48.00 SPINAL STENOSIS, UNSPECIFIED SPINAL REGION: ICD-10-CM

## 2024-12-17 DIAGNOSIS — G62.9 NEUROPATHY: ICD-10-CM

## 2024-12-17 RX ORDER — TESTOSTERONE CYPIONATE 200 MG/ML
INJECTION, SOLUTION INTRAMUSCULAR
Qty: 2 ML | Refills: 0 | Status: SHIPPED | OUTPATIENT
Start: 2024-12-20 | End: 2025-01-19

## 2024-12-17 RX ORDER — GABAPENTIN 300 MG/1
CAPSULE ORAL
Qty: 120 CAPSULE | Refills: 0 | Status: SHIPPED | OUTPATIENT
Start: 2024-12-20 | End: 2025-01-19

## 2024-12-17 NOTE — TELEPHONE ENCOUNTER
Cristina Menchaca called to request a refill on his medication.      Last office visit : 11/12/2024   Next office visit : 2/13/2025     Last UDS:   Benzodiazepine Screen, Urine   Date Value Ref Range Status   01/02/2024 -  Final     Buprenorphine Urine   Date Value Ref Range Status   01/02/2024 -  Final     Cocaine Metabolite Screen, Urine   Date Value Ref Range Status   01/02/2024 -  Final     Oxycodone Screen, Ur   Date Value Ref Range Status   01/02/2024 -  Final     Propoxyphene Screen, Urine   Date Value Ref Range Status   01/02/2024 -  Final     THC Screen, Urine   Date Value Ref Range Status   01/02/2024 +  Final     Tricyclic Antidepressants, Urine   Date Value Ref Range Status   01/02/2024 +  Final       Last Paulie: 9/19/24  Medication Contract: 8/7/23   Last Fill: 11/20/24    Requested Prescriptions     Pending Prescriptions Disp Refills    testosterone cypionate (DEPOTESTOTERONE CYPIONATE) 200 MG/ML injection [Pharmacy Med Name: testosterone cypionate 200 mg/mL intramuscular oil] 2 mL 0     Sig: INJECT 1 ML INTRAMUSCULARLY EVERY 14 DAYS    gabapentin (NEURONTIN) 300 MG capsule [Pharmacy Med Name: gabapentin 300 mg capsule] 120 capsule 0     Sig: TAKE 1 Capsule BY MOUTH FOUR TIMES DAILY                   Please approve or refuse this medication.   Thalia Maldonado LPN

## 2024-12-19 NOTE — TELEPHONE ENCOUNTER
Cristina Menchaca called to request a refill on his medication.      Last office visit : 11/12/2024   Next office visit : 2/13/2025     Requested Prescriptions     Pending Prescriptions Disp Refills    meclizine (ANTIVERT) 25 MG tablet 90 tablet 0     Sig: Take 1 tablet by mouth 3 times daily as needed for Dizziness            Valerie Dalton MA

## 2024-12-20 RX ORDER — MECLIZINE HYDROCHLORIDE 25 MG/1
25 TABLET ORAL 3 TIMES DAILY PRN
Qty: 90 TABLET | Refills: 0 | Status: SHIPPED | OUTPATIENT
Start: 2024-12-20 | End: 2025-01-19

## 2025-01-01 RX ORDER — LOSARTAN POTASSIUM 50 MG/1
TABLET ORAL
Qty: 60 TABLET | Refills: 3 | Status: SHIPPED | OUTPATIENT
Start: 2025-01-01

## 2025-01-04 DIAGNOSIS — M06.9 RHEUMATOID ARTHRITIS, INVOLVING UNSPECIFIED SITE, UNSPECIFIED WHETHER RHEUMATOID FACTOR PRESENT (HCC): ICD-10-CM

## 2025-01-06 RX ORDER — PREDNISONE 10 MG/1
TABLET ORAL
Qty: 30 TABLET | Refills: 0 | Status: SHIPPED | OUTPATIENT
Start: 2025-01-06

## 2025-01-08 DIAGNOSIS — E29.1 HYPOGONADISM MALE: ICD-10-CM

## 2025-01-08 NOTE — TELEPHONE ENCOUNTER
Cristina Menchaca called to request a refill on his medication.      Last office visit : 11/12/2024   Next office visit : 2/13/2025     Last UDS:   Benzodiazepine Screen, Urine   Date Value Ref Range Status   01/02/2024 -  Final     Buprenorphine Urine   Date Value Ref Range Status   01/02/2024 -  Final     Cocaine Metabolite Screen, Urine   Date Value Ref Range Status   01/02/2024 -  Final     Oxycodone Screen, Ur   Date Value Ref Range Status   01/02/2024 -  Final     Propoxyphene Screen, Urine   Date Value Ref Range Status   01/02/2024 -  Final     THC Screen, Urine   Date Value Ref Range Status   01/02/2024 +  Final     Tricyclic Antidepressants, Urine   Date Value Ref Range Status   01/02/2024 +  Final       Last Paulie: 01.08.2025  Medication Contract: 08.07.2023   Last Fill: 12.20.24    Requested Prescriptions     Pending Prescriptions Disp Refills    testosterone cypionate (DEPOTESTOTERONE CYPIONATE) 200 MG/ML injection [Pharmacy Med Name: testosterone cypionate 200 mg/mL intramuscular oil] 2 mL 0     Sig: INJECT 1 ML INTRAMUSCULARLY EVERY 14 DAYS         Please approve or refuse this medication.   Valerie Dalton MA

## 2025-01-09 RX ORDER — TESTOSTERONE CYPIONATE 200 MG/ML
INJECTION, SOLUTION INTRAMUSCULAR
Qty: 2 ML | Refills: 0 | Status: SHIPPED | OUTPATIENT
Start: 2025-01-09 | End: 2025-02-08

## 2025-01-23 DIAGNOSIS — G62.9 NEUROPATHY: ICD-10-CM

## 2025-01-23 DIAGNOSIS — M48.00 SPINAL STENOSIS, UNSPECIFIED SPINAL REGION: ICD-10-CM

## 2025-01-23 NOTE — TELEPHONE ENCOUNTER
Cristina Menchaca called to request a refill on his medication.      Last office visit : 11/12/2024   Next office visit : 2/13/2025     Last UDS:   Benzodiazepine Screen, Urine   Date Value Ref Range Status   01/02/2024 -  Final     Buprenorphine Urine   Date Value Ref Range Status   01/02/2024 -  Final     Cocaine Metabolite Screen, Urine   Date Value Ref Range Status   01/02/2024 -  Final     Oxycodone Screen, Ur   Date Value Ref Range Status   01/02/2024 -  Final     Propoxyphene Screen, Urine   Date Value Ref Range Status   01/02/2024 -  Final     THC Screen, Urine   Date Value Ref Range Status   01/02/2024 +  Final     Tricyclic Antidepressants, Urine   Date Value Ref Range Status   01/02/2024 +  Final       Last Paulie: 1/8/25  Medication Contract: 8/7/23   Last Fill: 12/20/24    Requested Prescriptions     Pending Prescriptions Disp Refills    gabapentin (NEURONTIN) 300 MG capsule [Pharmacy Med Name: gabapentin 300 mg capsule] 120 capsule 0     Sig: TAKE 1 Capsule BY MOUTH FOUR TIMES DAILY                   Please approve or refuse this medication.   Thalia Maldonado LPN

## 2025-01-27 RX ORDER — GABAPENTIN 300 MG/1
CAPSULE ORAL
Qty: 120 CAPSULE | Refills: 0 | Status: SHIPPED | OUTPATIENT
Start: 2025-01-27 | End: 2025-02-26

## 2025-02-03 DIAGNOSIS — M06.9 RHEUMATOID ARTHRITIS, INVOLVING UNSPECIFIED SITE, UNSPECIFIED WHETHER RHEUMATOID FACTOR PRESENT (HCC): ICD-10-CM

## 2025-02-03 RX ORDER — PREDNISONE 10 MG/1
TABLET ORAL
Qty: 30 TABLET | Refills: 0 | Status: SHIPPED | OUTPATIENT
Start: 2025-02-03

## 2025-02-03 NOTE — TELEPHONE ENCOUNTER
Cristina Menchaca called to request a refill on his medication.      Last office visit : 11/12/2024   Next office visit : 2/13/2025     Requested Prescriptions     Pending Prescriptions Disp Refills    predniSONE (DELTASONE) 10 MG tablet [Pharmacy Med Name: prednisone 10 mg tablet] 30 tablet 0     Sig: TAKE 1 Tablet BY MOUTH TWICE DAILY AS NEEDED FOR ARTHRITIS FLARE            Sandra Ashraf LPN

## 2025-02-07 DIAGNOSIS — E29.1 HYPOGONADISM MALE: ICD-10-CM

## 2025-02-07 NOTE — TELEPHONE ENCOUNTER
Cristina Menchaca called to request a refill on his medication.      Last office visit : 11/12/2024   Next office visit : 2/13/2025     Last UDS:   Benzodiazepine Screen, Urine   Date Value Ref Range Status   01/02/2024 -  Final     Buprenorphine Urine   Date Value Ref Range Status   01/02/2024 -  Final     Cocaine Metabolite Screen, Urine   Date Value Ref Range Status   01/02/2024 -  Final     Oxycodone Screen, Ur   Date Value Ref Range Status   01/02/2024 -  Final     Propoxyphene Screen, Urine   Date Value Ref Range Status   01/02/2024 -  Final     THC Screen, Urine   Date Value Ref Range Status   01/02/2024 +  Final     Tricyclic Antidepressants, Urine   Date Value Ref Range Status   01/02/2024 +  Final       Last Paulie: 1-8-25  Medication Contract: 8/7/23   Last Fill: 1/9/25    Requested Prescriptions     Pending Prescriptions Disp Refills    testosterone cypionate (DEPOTESTOTERONE CYPIONATE) 200 MG/ML injection 2 mL 0                   Please approve or refuse this medication.   Thalia Maldonado LPN

## 2025-02-10 DIAGNOSIS — E29.1 HYPOGONADISM MALE: ICD-10-CM

## 2025-02-10 RX ORDER — TESTOSTERONE CYPIONATE 200 MG/ML
200 INJECTION, SOLUTION INTRAMUSCULAR
Qty: 2 ML | Refills: 0 | Status: SHIPPED | OUTPATIENT
Start: 2025-02-10 | End: 2025-03-12

## 2025-02-11 RX ORDER — TESTOSTERONE CYPIONATE 200 MG/ML
INJECTION, SOLUTION INTRAMUSCULAR
Qty: 2 ML | Refills: 0 | OUTPATIENT
Start: 2025-02-11 | End: 2025-03-13

## 2025-02-11 NOTE — TELEPHONE ENCOUNTER
Cristina Menchaca called to request a refill on his medication.      Last office visit : 11/12/2024   Next office visit : 2/13/2025     Last UDS:   Benzodiazepine Screen, Urine   Date Value Ref Range Status   01/02/2024 -  Final     Buprenorphine Urine   Date Value Ref Range Status   01/02/2024 -  Final     Cocaine Metabolite Screen, Urine   Date Value Ref Range Status   01/02/2024 -  Final     Oxycodone Screen, Ur   Date Value Ref Range Status   01/02/2024 -  Final     Propoxyphene Screen, Urine   Date Value Ref Range Status   01/02/2024 -  Final     THC Screen, Urine   Date Value Ref Range Status   01/02/2024 +  Final     Tricyclic Antidepressants, Urine   Date Value Ref Range Status   01/02/2024 +  Final       Last Paulie: 1/8/2025  Medication Contract: 8/7/2023   Last Fill: 2/10/2025    Requested Prescriptions     Pending Prescriptions Disp Refills    testosterone cypionate (DEPOTESTOTERONE CYPIONATE) 200 MG/ML injection [Pharmacy Med Name: testosterone cypionate 200 mg/mL intramuscular oil] 2 mL 0     Sig: INJECT 1 ML INTRAMUSCULARLY EVERY 14 DAYS                   Please approve or refuse this medication.   Sandra Ashraf LPN

## 2025-03-04 DIAGNOSIS — M06.9 RHEUMATOID ARTHRITIS, INVOLVING UNSPECIFIED SITE, UNSPECIFIED WHETHER RHEUMATOID FACTOR PRESENT (HCC): ICD-10-CM

## 2025-03-04 NOTE — TELEPHONE ENCOUNTER
Cristina Menchaca called to request a refill on his medication.      Last office visit : 11/12/2024   Next office visit : 3/13/2025     Requested Prescriptions     Pending Prescriptions Disp Refills    predniSONE (DELTASONE) 10 MG tablet [Pharmacy Med Name: prednisone 10 mg tablet] 30 tablet 0     Sig: TAKE 1 Tablet BY MOUTH TWICE DAILY AS NEEDED FOR ARTHRITIS FLARE            Lisa Balbuena LPN

## 2025-03-05 RX ORDER — PREDNISONE 10 MG/1
TABLET ORAL
Qty: 30 TABLET | Refills: 0 | Status: SHIPPED | OUTPATIENT
Start: 2025-03-05

## 2025-03-06 ENCOUNTER — TELEPHONE (OUTPATIENT)
Dept: PRIMARY CARE CLINIC | Age: 60
End: 2025-03-06

## 2025-03-06 NOTE — TELEPHONE ENCOUNTER
Patient is calling in regards tp a referra for rheumatology. State it was suppose to have been done in October 2024 and has not heard anything of it since? Asking if we can please get it sent out as he is in a lot of pain    Please adise

## 2025-03-11 ENCOUNTER — TELEPHONE (OUTPATIENT)
Dept: PRIMARY CARE CLINIC | Age: 60
End: 2025-03-11

## 2025-03-11 NOTE — TELEPHONE ENCOUNTER
Patient had requested he be referred to different rheumatologist. A referral was placed in October I believe for that.

## 2025-03-11 NOTE — TELEPHONE ENCOUNTER
KRISTA Marcial from [facility] called today 03- to report that they did receive the referral. The patient is established with Dr. Cano. She attempted to make appointment for the patient for 03- at 1:20 pm. The patient informed Lloyd that he is dissatisfied with the care he has gotten. He said that he never has seen the Doctor, he was seen by  4 times out of the 5 visits he had. The one visit was scheduled with Nurse Practitioner. Patient refused the appointment and told Lloyd that he would call them when he needs them. During the conversion the patient did report that he was hurting all over.     Lloyd reports that the patient was rude.

## 2025-03-13 DIAGNOSIS — M48.00 SPINAL STENOSIS, UNSPECIFIED SPINAL REGION: ICD-10-CM

## 2025-03-13 DIAGNOSIS — G62.9 NEUROPATHY: ICD-10-CM

## 2025-03-17 DIAGNOSIS — M06.9 RHEUMATOID ARTHRITIS, INVOLVING UNSPECIFIED SITE, UNSPECIFIED WHETHER RHEUMATOID FACTOR PRESENT (HCC): ICD-10-CM

## 2025-03-17 NOTE — TELEPHONE ENCOUNTER
Cristina Menchaca called to request a refill on his medication.      Last office visit : 11/12/2024   Next office visit : 3/19/2025     Last UDS:   Benzodiazepine Screen, Urine   Date Value Ref Range Status   01/02/2024 -  Final     Buprenorphine Urine   Date Value Ref Range Status   01/02/2024 -  Final     Cocaine Metabolite Screen, Urine   Date Value Ref Range Status   01/02/2024 -  Final     Oxycodone Screen, Ur   Date Value Ref Range Status   01/02/2024 -  Final     Propoxyphene Screen, Urine   Date Value Ref Range Status   01/02/2024 -  Final     THC Screen, Urine   Date Value Ref Range Status   01/02/2024 +  Final     Tricyclic Antidepressants, Urine   Date Value Ref Range Status   01/02/2024 +  Final       Last Paulie: 1/8/2025  Medication Contract: 2023   Last Fill: 1/27/2025    Requested Prescriptions     Pending Prescriptions Disp Refills    gabapentin (NEURONTIN) 300 MG capsule [Pharmacy Med Name: gabapentin 300 mg capsule] 120 capsule 0     Sig: TAKE 1 Capsule BY MOUTH FOUR TIMES DAILY                   Please approve or refuse this medication.   Sandra Ashraf LPN

## 2025-03-18 RX ORDER — GABAPENTIN 300 MG/1
CAPSULE ORAL
Qty: 120 CAPSULE | Refills: 0 | Status: SHIPPED | OUTPATIENT
Start: 2025-03-18 | End: 2025-04-17

## 2025-03-19 ENCOUNTER — OFFICE VISIT (OUTPATIENT)
Dept: PRIMARY CARE CLINIC | Age: 60
End: 2025-03-19
Payer: MEDICARE

## 2025-03-19 VITALS
WEIGHT: 280 LBS | HEIGHT: 68 IN | BODY MASS INDEX: 42.44 KG/M2 | HEART RATE: 73 BPM | RESPIRATION RATE: 20 BRPM | SYSTOLIC BLOOD PRESSURE: 138 MMHG | TEMPERATURE: 98 F | OXYGEN SATURATION: 93 % | DIASTOLIC BLOOD PRESSURE: 84 MMHG

## 2025-03-19 DIAGNOSIS — M06.9 RHEUMATOID ARTHRITIS, INVOLVING UNSPECIFIED SITE, UNSPECIFIED WHETHER RHEUMATOID FACTOR PRESENT (HCC): ICD-10-CM

## 2025-03-19 DIAGNOSIS — E11.9 TYPE 2 DIABETES MELLITUS WITHOUT COMPLICATION, WITHOUT LONG-TERM CURRENT USE OF INSULIN: ICD-10-CM

## 2025-03-19 DIAGNOSIS — G62.9 NEUROPATHY: ICD-10-CM

## 2025-03-19 DIAGNOSIS — Z72.0 TOBACCO ABUSE DISORDER: ICD-10-CM

## 2025-03-19 DIAGNOSIS — E78.49 OTHER HYPERLIPIDEMIA: ICD-10-CM

## 2025-03-19 DIAGNOSIS — Z79.899 DRUG THERAPY: Primary | ICD-10-CM

## 2025-03-19 DIAGNOSIS — E29.1 HYPOGONADISM IN MALE: ICD-10-CM

## 2025-03-19 DIAGNOSIS — K21.9 GASTROESOPHAGEAL REFLUX DISEASE WITHOUT ESOPHAGITIS: ICD-10-CM

## 2025-03-19 DIAGNOSIS — J44.9 CHRONIC OBSTRUCTIVE PULMONARY DISEASE, UNSPECIFIED COPD TYPE (HCC): ICD-10-CM

## 2025-03-19 PROCEDURE — 3079F DIAST BP 80-89 MM HG: CPT | Performed by: NURSE PRACTITIONER

## 2025-03-19 PROCEDURE — 99406 BEHAV CHNG SMOKING 3-10 MIN: CPT | Performed by: NURSE PRACTITIONER

## 2025-03-19 PROCEDURE — 99215 OFFICE O/P EST HI 40 MIN: CPT | Performed by: NURSE PRACTITIONER

## 2025-03-19 PROCEDURE — 3075F SYST BP GE 130 - 139MM HG: CPT | Performed by: NURSE PRACTITIONER

## 2025-03-19 RX ORDER — PREDNISONE 10 MG/1
TABLET ORAL
Qty: 30 TABLET | Refills: 11 | Status: SHIPPED | OUTPATIENT
Start: 2025-03-19

## 2025-03-19 SDOH — ECONOMIC STABILITY: FOOD INSECURITY: WITHIN THE PAST 12 MONTHS, THE FOOD YOU BOUGHT JUST DIDN'T LAST AND YOU DIDN'T HAVE MONEY TO GET MORE.: SOMETIMES TRUE

## 2025-03-19 SDOH — ECONOMIC STABILITY: FOOD INSECURITY: WITHIN THE PAST 12 MONTHS, YOU WORRIED THAT YOUR FOOD WOULD RUN OUT BEFORE YOU GOT MONEY TO BUY MORE.: SOMETIMES TRUE

## 2025-03-19 ASSESSMENT — ENCOUNTER SYMPTOMS
VOICE CHANGE: 0
SHORTNESS OF BREATH: 0
PHOTOPHOBIA: 0
NAUSEA: 0
COLOR CHANGE: 0
VOMITING: 0
RHINORRHEA: 0
COUGH: 0
BACK PAIN: 0

## 2025-03-19 ASSESSMENT — PATIENT HEALTH QUESTIONNAIRE - PHQ9
SUM OF ALL RESPONSES TO PHQ QUESTIONS 1-9: 12
8. MOVING OR SPEAKING SO SLOWLY THAT OTHER PEOPLE COULD HAVE NOTICED. OR THE OPPOSITE, BEING SO FIGETY OR RESTLESS THAT YOU HAVE BEEN MOVING AROUND A LOT MORE THAN USUAL: NOT AT ALL
10. IF YOU CHECKED OFF ANY PROBLEMS, HOW DIFFICULT HAVE THESE PROBLEMS MADE IT FOR YOU TO DO YOUR WORK, TAKE CARE OF THINGS AT HOME, OR GET ALONG WITH OTHER PEOPLE: SOMEWHAT DIFFICULT
SUM OF ALL RESPONSES TO PHQ QUESTIONS 1-9: 12
6. FEELING BAD ABOUT YOURSELF - OR THAT YOU ARE A FAILURE OR HAVE LET YOURSELF OR YOUR FAMILY DOWN: MORE THAN HALF THE DAYS
5. POOR APPETITE OR OVEREATING: MORE THAN HALF THE DAYS
2. FEELING DOWN, DEPRESSED OR HOPELESS: SEVERAL DAYS
7. TROUBLE CONCENTRATING ON THINGS, SUCH AS READING THE NEWSPAPER OR WATCHING TELEVISION: SEVERAL DAYS
SUM OF ALL RESPONSES TO PHQ QUESTIONS 1-9: 12
1. LITTLE INTEREST OR PLEASURE IN DOING THINGS: SEVERAL DAYS
3. TROUBLE FALLING OR STAYING ASLEEP: NEARLY EVERY DAY
SUM OF ALL RESPONSES TO PHQ QUESTIONS 1-9: 12
9. THOUGHTS THAT YOU WOULD BE BETTER OFF DEAD, OR OF HURTING YOURSELF: NOT AT ALL
4. FEELING TIRED OR HAVING LITTLE ENERGY: MORE THAN HALF THE DAYS

## 2025-03-19 NOTE — PROGRESS NOTES
of spoken language to printed texts.  The electronic translation of spoken language may be erroneous, or at times, nonsensical words or phrases may be inadvertentlytranscribed.  Although I have reviewed the note for such errors, some may still exist.

## 2025-03-19 NOTE — PATIENT INSTRUCTIONS
Labs in [address].   Follow-up in 3 months or sooner if needed.   Discontinue testosterone.   Stop smoking.   Trelegy samples given today.

## 2025-03-25 DIAGNOSIS — I10 ESSENTIAL HYPERTENSION: ICD-10-CM

## 2025-03-26 NOTE — TELEPHONE ENCOUNTER
Cristina Menchaca called to request a refill on his medication.      Last office visit : 3/19/2025   Next office visit : 6/23/2025     Requested Prescriptions     Pending Prescriptions Disp Refills    amLODIPine (NORVASC) 5 MG tablet [Pharmacy Med Name: amlodipine 5 mg tablet] 90 tablet 1     Sig: TAKE ONE TABLET BY MOUTH EVERY DAY            Valerie Dalton MA

## 2025-03-27 RX ORDER — AMLODIPINE BESYLATE 5 MG/1
5 TABLET ORAL DAILY
Qty: 90 TABLET | Refills: 1 | Status: SHIPPED | OUTPATIENT
Start: 2025-03-27

## 2025-04-14 ENCOUNTER — HOSPITAL ENCOUNTER (EMERGENCY)
Facility: HOSPITAL | Age: 60
Discharge: HOME | End: 2025-04-14
Attending: EMERGENCY MEDICINE | Admitting: EMERGENCY MEDICINE
Payer: MEDICARE

## 2025-04-14 ENCOUNTER — APPOINTMENT (OUTPATIENT)
Dept: CT IMAGING | Facility: HOSPITAL | Age: 60
End: 2025-04-14
Payer: MEDICARE

## 2025-04-14 VITALS
SYSTOLIC BLOOD PRESSURE: 120 MMHG | RESPIRATION RATE: 16 BRPM | WEIGHT: 280 LBS | TEMPERATURE: 98.1 F | BODY MASS INDEX: 42.44 KG/M2 | DIASTOLIC BLOOD PRESSURE: 80 MMHG | HEIGHT: 68 IN | OXYGEN SATURATION: 92 % | HEART RATE: 75 BPM

## 2025-04-14 DIAGNOSIS — G89.29 CHRONIC BILATERAL LOW BACK PAIN WITH LEFT-SIDED SCIATICA: Primary | ICD-10-CM

## 2025-04-14 DIAGNOSIS — M54.42 CHRONIC BILATERAL LOW BACK PAIN WITH LEFT-SIDED SCIATICA: Primary | ICD-10-CM

## 2025-04-14 DIAGNOSIS — R03.0 ELEVATED BLOOD PRESSURE READING: ICD-10-CM

## 2025-04-14 DIAGNOSIS — M48.061 SPINAL STENOSIS OF LUMBAR REGION WITHOUT NEUROGENIC CLAUDICATION: ICD-10-CM

## 2025-04-14 LAB
ANION GAP SERPL CALCULATED.3IONS-SCNC: 10 MMOL/L (ref 5–15)
BASOPHILS # BLD AUTO: 0.12 10*3/MM3 (ref 0–0.2)
BASOPHILS NFR BLD AUTO: 0.7 % (ref 0–1.5)
BUN SERPL-MCNC: 27 MG/DL (ref 8–23)
BUN/CREAT SERPL: 22.1 (ref 7–25)
CALCIUM SPEC-SCNC: 9.4 MG/DL (ref 8.6–10.5)
CHLORIDE SERPL-SCNC: 101 MMOL/L (ref 98–107)
CO2 SERPL-SCNC: 23 MMOL/L (ref 22–29)
CREAT SERPL-MCNC: 1.22 MG/DL (ref 0.76–1.27)
CRP SERPL-MCNC: 3.58 MG/DL (ref 0–0.5)
DEPRECATED RDW RBC AUTO: 52.6 FL (ref 37–54)
EGFRCR SERPLBLD CKD-EPI 2021: 67.9 ML/MIN/1.73
EOSINOPHIL # BLD AUTO: 0.06 10*3/MM3 (ref 0–0.4)
EOSINOPHIL NFR BLD AUTO: 0.3 % (ref 0.3–6.2)
ERYTHROCYTE [DISTWIDTH] IN BLOOD BY AUTOMATED COUNT: 15.5 % (ref 12.3–15.4)
GLUCOSE SERPL-MCNC: 141 MG/DL (ref 65–99)
HCT VFR BLD AUTO: 46.7 % (ref 37.5–51)
HGB BLD-MCNC: 15.3 G/DL (ref 13–17.7)
IMM GRANULOCYTES # BLD AUTO: 0.31 10*3/MM3 (ref 0–0.05)
IMM GRANULOCYTES NFR BLD AUTO: 1.7 % (ref 0–0.5)
LYMPHOCYTES # BLD AUTO: 1.2 10*3/MM3 (ref 0.7–3.1)
LYMPHOCYTES NFR BLD AUTO: 6.6 % (ref 19.6–45.3)
MCH RBC QN AUTO: 30.3 PG (ref 26.6–33)
MCHC RBC AUTO-ENTMCNC: 32.8 G/DL (ref 31.5–35.7)
MCV RBC AUTO: 92.5 FL (ref 79–97)
MONOCYTES # BLD AUTO: 0.7 10*3/MM3 (ref 0.1–0.9)
MONOCYTES NFR BLD AUTO: 3.9 % (ref 5–12)
NEUTROPHILS NFR BLD AUTO: 15.74 10*3/MM3 (ref 1.7–7)
NEUTROPHILS NFR BLD AUTO: 86.8 % (ref 42.7–76)
NRBC BLD AUTO-RTO: 0 /100 WBC (ref 0–0.2)
PLATELET # BLD AUTO: 362 10*3/MM3 (ref 140–450)
PMV BLD AUTO: 9.6 FL (ref 6–12)
POTASSIUM SERPL-SCNC: 4.5 MMOL/L (ref 3.5–5.2)
RBC # BLD AUTO: 5.05 10*6/MM3 (ref 4.14–5.8)
SODIUM SERPL-SCNC: 134 MMOL/L (ref 136–145)
WBC NRBC COR # BLD AUTO: 18.13 10*3/MM3 (ref 3.4–10.8)

## 2025-04-14 PROCEDURE — 80048 BASIC METABOLIC PNL TOTAL CA: CPT | Performed by: EMERGENCY MEDICINE

## 2025-04-14 PROCEDURE — 86140 C-REACTIVE PROTEIN: CPT | Performed by: EMERGENCY MEDICINE

## 2025-04-14 PROCEDURE — 96375 TX/PRO/DX INJ NEW DRUG ADDON: CPT

## 2025-04-14 PROCEDURE — 25010000002 DEXAMETHASONE PER 1 MG: Performed by: EMERGENCY MEDICINE

## 2025-04-14 PROCEDURE — 36415 COLL VENOUS BLD VENIPUNCTURE: CPT

## 2025-04-14 PROCEDURE — 25010000002 ONDANSETRON PER 1 MG: Performed by: EMERGENCY MEDICINE

## 2025-04-14 PROCEDURE — 96374 THER/PROPH/DIAG INJ IV PUSH: CPT

## 2025-04-14 PROCEDURE — 72131 CT LUMBAR SPINE W/O DYE: CPT

## 2025-04-14 PROCEDURE — 25010000002 HYDROMORPHONE PER 4 MG: Performed by: EMERGENCY MEDICINE

## 2025-04-14 PROCEDURE — 85025 COMPLETE CBC W/AUTO DIFF WBC: CPT | Performed by: EMERGENCY MEDICINE

## 2025-04-14 PROCEDURE — 99284 EMERGENCY DEPT VISIT MOD MDM: CPT

## 2025-04-14 RX ORDER — HYDROMORPHONE HYDROCHLORIDE 1 MG/ML
0.5 INJECTION, SOLUTION INTRAMUSCULAR; INTRAVENOUS; SUBCUTANEOUS ONCE
Refills: 0 | Status: COMPLETED | OUTPATIENT
Start: 2025-04-14 | End: 2025-04-14

## 2025-04-14 RX ORDER — ONDANSETRON 2 MG/ML
4 INJECTION INTRAMUSCULAR; INTRAVENOUS ONCE
Status: COMPLETED | OUTPATIENT
Start: 2025-04-14 | End: 2025-04-14

## 2025-04-14 RX ORDER — NIFEDIPINE 10 MG/1
10 CAPSULE ORAL ONCE
Status: COMPLETED | OUTPATIENT
Start: 2025-04-14 | End: 2025-04-14

## 2025-04-14 RX ORDER — DEXAMETHASONE SODIUM PHOSPHATE 10 MG/ML
10 INJECTION, SOLUTION INTRA-ARTICULAR; INTRALESIONAL; INTRAMUSCULAR; INTRAVENOUS; SOFT TISSUE ONCE
Status: COMPLETED | OUTPATIENT
Start: 2025-04-14 | End: 2025-04-14

## 2025-04-14 RX ORDER — OXYCODONE AND ACETAMINOPHEN 7.5; 325 MG/1; MG/1
1 TABLET ORAL EVERY 6 HOURS PRN
Qty: 4 TABLET | Refills: 0 | Status: SHIPPED | OUTPATIENT
Start: 2025-04-14

## 2025-04-14 RX ADMIN — DEXAMETHASONE SODIUM PHOSPHATE 10 MG: 10 INJECTION INTRAMUSCULAR; INTRAVENOUS at 09:41

## 2025-04-14 RX ADMIN — ONDANSETRON 4 MG: 2 INJECTION INTRAMUSCULAR; INTRAVENOUS at 09:41

## 2025-04-14 RX ADMIN — NIFEDIPINE 10 MG: 10 CAPSULE ORAL at 09:29

## 2025-04-14 RX ADMIN — HYDROMORPHONE HYDROCHLORIDE 0.5 MG: 1 INJECTION, SOLUTION INTRAMUSCULAR; INTRAVENOUS; SUBCUTANEOUS at 09:41

## 2025-04-14 NOTE — ED PROVIDER NOTES
Subjective   History of Present Illness  Patient with history of surgery to the lower back done by [facility]opedic [facility].  3 years ago over the past 1 month he started hurting in his lower back has not been able to go to Dr. Chaudhary's office and came to the ED for pain management.  He supposed to be in the orthopedic spine surgery office today.  There is no urinary incontinence there is no stool incontinence and no retention.  Patient said he has difficulty walking because of pain in his lower back with no paralysis noted.    Back Pain  Location:  Lumbar spine  Quality:  Aching, cramping and shooting  Radiates to:  L posterior upper leg  Pain severity:  Moderate  Pain is:  Same all the time  Onset quality:  Gradual  Duration:  1 month  Timing:  Constant  Progression:  Worsening  Chronicity:  Recurrent  Context: not emotional stress, not falling, not jumping from heights, not MVA, not occupational injury, not physical stress, not recent illness and not recent injury    Relieved by:  Being still  Worsened by:  Movement, twisting and bending  Associated symptoms: paresthesias and weakness    Associated symptoms: no abdominal pain, no abdominal swelling, no bowel incontinence, no chest pain, no fever, no headaches, no leg pain, no numbness, no pelvic pain, no perianal numbness and no tingling    Risk factors: obesity    Risk factors: no hx of cancer and no hx of osteoporosis        Review of Systems   Constitutional: Negative.  Negative for chills, fatigue and fever.   HENT: Negative.  Negative for congestion.    Respiratory: Negative.  Negative for cough, chest tightness and stridor.    Cardiovascular: Negative.  Negative for chest pain.   Gastrointestinal: Negative.  Negative for abdominal distention, abdominal pain, bowel incontinence, nausea and vomiting.   Endocrine: Negative.    Genitourinary: Negative.  Negative for difficulty urinating, flank pain and pelvic pain.   Musculoskeletal:  Positive for back pain.    Skin: Negative.  Negative for color change.   Neurological:  Positive for weakness and paresthesias. Negative for dizziness, tingling, numbness and headaches.   All other systems reviewed and are negative.      Past Medical History:   Diagnosis Date    Back pain     Hypertension        Allergies   Allergen Reactions    Shellfish-Derived Products Swelling       Past Surgical History:   Procedure Laterality Date    BACK SURGERY      FEMORAL ARTERY STENT      HEMORRHOIDECTOMY      SPLENECTOMY         History reviewed. No pertinent family history.    Social History     Socioeconomic History    Marital status:    Tobacco Use    Smoking status: Light Smoker   Substance and Sexual Activity    Alcohol use: No    Drug use: Yes     Types: Marijuana           Objective   Physical Exam  Vitals and nursing note reviewed. Exam conducted with a chaperone present.   Constitutional:       General: He is awake.      Appearance: Normal appearance. He is well-developed. He is obese. He is not ill-appearing.   HENT:      Head: Normocephalic and atraumatic.   Eyes:      General: Lids are normal.      Conjunctiva/sclera: Conjunctivae normal.      Pupils: Pupils are equal, round, and reactive to light.   Cardiovascular:      Rate and Rhythm: Normal rate and regular rhythm.      Chest Wall: PMI is not displaced.      Pulses: Normal pulses.      Heart sounds: Normal heart sounds.   Pulmonary:      Effort: Pulmonary effort is normal.      Breath sounds: Normal breath sounds. No decreased breath sounds.   Abdominal:      General: Abdomen is flat. Bowel sounds are normal.      Palpations: Abdomen is soft.      Tenderness: There is no abdominal tenderness.   Musculoskeletal:         General: Normal range of motion.      Cervical back: Full passive range of motion without pain, normal range of motion and neck supple.   Skin:     General: Skin is warm and dry.      Capillary Refill: Capillary refill takes less than 2 seconds.    Neurological:      General: No focal deficit present.      Mental Status: He is alert and oriented to person, place, and time.      GCS: GCS eye subscore is 4. GCS verbal subscore is 5. GCS motor subscore is 6.      Cranial Nerves: Cranial nerves 2-12 are intact. No cranial nerve deficit or facial asymmetry.      Sensory: Sensation is intact.      Motor: Motor function is intact. No weakness, atrophy or abnormal muscle tone.      Deep Tendon Reflexes: Reflexes are normal and symmetric.      Reflex Scores:       Bicep reflexes are 2+ on the right side.       Brachioradialis reflexes are 2+ on the left side.       Patellar reflexes are 2+ on the right side and 2+ on the left side.     Comments: Patient is a unable to lift the left leg off the bed without pain and discomfort in the lower back.  Movement of the right leg is intact   Psychiatric:         Behavior: Behavior is cooperative.         Procedures           ED Course  ED Course as of 04/14/25 1116   Mon Apr 14, 2025   0923 Dr. Randolph  office was called [TS]   1107 Patient came into the ED with lower back pain which have been going on for the past 1 month he was supposed to see Dr. Hyman's office today but showed up over here for pain control.  Blood pressure was elevated was given p.o. Procardia and pain medication and steroids the patient is on steroids at home because of rheumatoid arthritis.  CT shows moderate lumbar spinal stenosis. [TS]   1108 I discussed this case with  nurse with their recommendation was that the patient needs to follow-up with them as an outpatient and reestablish care since they have not seen him for many years. [TS]   1108 On account of the fact the patient was still having some pain and discomfort I also discussed this case with neurosurgery and the recommendation was the patient can be admitted to medicine service and they will consult. [TS]   1108 I have discussed this case Isi with the patient and given the  options.  He has elevated leukocyte count but this is not secondary to infection to see because of the continued steroid use and has been elevated in the past also.  I have offered the patient to be admitted to the medicine service and they will consult neurosurgery or orthopedics for him [TS]   1109 Patient wants to be seen by  I have informed him that I can admit him to the hospital and the hospital service can consult the aforementioned physician to come and see him.  There is a concern or problem with that they always have a backup neurosurgical service to evaluate him. [TS]   1109 Patient at this point has decided that he is going to go home.  The possibility of increased morbidity mortality associated with back issues spinal stenosis etc. has been explained the patient he understands that he is awake and alert and oriented at this time.  Knows where he is and is asking appropriate questions and does not want to stay in the hospital and wants to go home. [TS]   1113 I had a discussion with the patient/family regarding diagnosis, diagnostic results, treatment plan, and medications.  The patient/family indicated understanding of these instructions.  I spent adequate time at the bedside prior to discharge necessary to discuss the aftercare instructions, giving patient education, providing explanations of the results of our evaluations/findings, and my decision making to assure that the patient/family understand the plan of care.  Time was allotted to answer questions at that time and throughout the ED course.  Emphasis was placed on timely follow-up after discharge.  I also discussed the potential for the development of an acute emergent condition requiring further evaluation, return to the ER, admission, or even surgical intervention. I discussed that we found nothing during the visit today indicating the need for further ER workup at this time, admission to the hospital, or the presence of an acute  unstable medical condition.  I encouraged the patient to return to the emergency department immediately for ANY concerns, worsening, new complaints, or if symptoms persist and unable to seek follow-up in a timely fashion.  The patient/family expressed understanding and agreement with this plan.    [TS]      ED Course User Index  [TS] Jerome Nelson MD KASPER reviewed by Jerome Nelson MD       Medical Decision Making  Lower back pain no trauma status post surgery 2 years ago.  No difficulty ambulating walking bending progressively worsening pain    Problems Addressed:  Chronic bilateral low back pain with left-sided sciatica: chronic illness or injury with exacerbation, progression, or side effects of treatment  Elevated blood pressure reading: chronic illness or injury    Amount and/or Complexity of Data Reviewed  Labs: ordered.     Details: Labs reviewed patient has got leukocytosis secondary to steroid use.  Radiology: ordered.     Details: CT scan was reviewed  Discussion of management or test interpretation with external provider(s): Belg[place] with  office staff and discussed with neurosurgery.    Risk  Prescription drug management.  Risk Details: Patient presented with back pain.  Presentation not consistent with other acute, emergent causes of back pain at this time.  Low suspicion for dissection there is no hypotension, no pulse deficits, and no tearing back/abdominal pain.  Low suspicion for PE as low risk for Wells criteria and the patient is not tachycardic or tachypneic.  Low suspicion for cauda equina or acute cord compression as there is no evidence of trauma no bowel or urinary incontinence, urinary retention, saddle anesthesia, or distal weakness.  Low suspicion for renal colic or pyelonephritis as the patient is afebrile, has no CVA tenderness, and has no urinary symptoms.  Low concern for fracture as there is no evidence of bony tenderness or  recent trauma and no palpable step-offs.  Low concern for malignancy as there is no recent history of weakness, weight loss, malaise, or history of malignancy.  Low suspicion for upper cord abnormalities without any evidence of upper extremity weakness, sensory deficits, or any recent trauma.  There are no red flag signsThe patient endorse NO: fevers, chills, recent spinal procedures, bowel/bladder incontinence, IV drug use, cancer,  recent weight loss, trauma ,weakness , tingling, dysuria, hematuria        Final diagnoses:   Elevated blood pressure reading   Chronic bilateral low back pain with left-sided sciatica   Spinal stenosis of lumbar region without neurogenic claudication       ED Disposition  ED Disposition       ED Disposition   Discharge    Condition   Stable    Comment   --               Gina Lynne, [address]  [facility] [address]  [phone]    Schedule an appointment as soon as possible for a visit in 2 days      NGA Hyman MD  [address]  [facility] [address]  [phone]    Schedule an appointment as soon as possible for a visit            Medication List        New Prescriptions      oxyCODONE-acetaminophen 7.5-325 MG per tablet  Commonly known as: PERCOCET  Take 1 tablet by mouth Every 6 (Six) Hours As Needed for Moderate Pain for up to 4 doses.               Where to Get Your Medications        These medications were sent to [facility] - [place], [place] - [address] [place] [address] [phone] [facility] [phone]   [address] [facility] [address]      Phone: [phone]   oxyCODONE-acetaminophen 7.5-325 MG per tablet            Jerome Nelson MD  04/14/25 4848       Jerome Nelson MD  04/14/25 2258

## 2025-04-14 NOTE — ED NOTES
"Pt states by \"broke back\" he means it does not work right.  Pt states no recent injury to back. Pt states has problems getting up and down.  Pt states ab pain to left lower quadrant to scrotal sac as well  "

## 2025-04-14 NOTE — ED NOTES
Pt states slight improvement in back pain at this time.  Brown marks are noted to his back, which he states is from a heating pad

## 2025-05-05 DIAGNOSIS — G62.9 NEUROPATHY: ICD-10-CM

## 2025-05-05 DIAGNOSIS — M48.00 SPINAL STENOSIS, UNSPECIFIED SPINAL REGION: ICD-10-CM

## 2025-05-06 NOTE — TELEPHONE ENCOUNTER
Cristina Menchaca called to request a refill on his medication.      Last office visit : 3/19/2025   Next office visit : 6/23/2025     Last UDS:   Benzodiazepine Screen, Urine   Date Value Ref Range Status   01/02/2024 -  Final     Buprenorphine Urine   Date Value Ref Range Status   01/02/2024 -  Final     Cocaine Metabolite Screen, Urine   Date Value Ref Range Status   01/02/2024 -  Final     Oxycodone Screen, Ur   Date Value Ref Range Status   01/02/2024 -  Final     Propoxyphene Screen, Urine   Date Value Ref Range Status   01/02/2024 -  Final     THC Screen, Urine   Date Value Ref Range Status   01/02/2024 +  Final     Tricyclic Antidepressants, Urine   Date Value Ref Range Status   01/02/2024 +  Final       Last Paulie: 5/6/2025  Medication Contract: 8/7/2023   Last Fill: 3/18/2025    Requested Prescriptions     Pending Prescriptions Disp Refills    gabapentin (NEURONTIN) 300 MG capsule [Pharmacy Med Name: gabapentin 300 mg capsule] 120 capsule 0     Sig: TAKE 1 Capsule BY MOUTH FOUR TIMES DAILY         Please approve or refuse this medication.   Ari Razo MA

## 2025-05-07 RX ORDER — GABAPENTIN 300 MG/1
CAPSULE ORAL
Qty: 120 CAPSULE | Refills: 0 | Status: SHIPPED | OUTPATIENT
Start: 2025-05-07 | End: 2025-06-06

## 2025-06-03 DIAGNOSIS — M48.00 SPINAL STENOSIS, UNSPECIFIED SPINAL REGION: ICD-10-CM

## 2025-06-03 DIAGNOSIS — G62.9 NEUROPATHY: ICD-10-CM

## 2025-06-09 RX ORDER — GABAPENTIN 300 MG/1
CAPSULE ORAL
Qty: 120 CAPSULE | Refills: 0 | OUTPATIENT
Start: 2025-06-09 | End: 2025-07-09

## 2025-06-16 ENCOUNTER — TELEPHONE (OUTPATIENT)
Dept: PRIMARY CARE CLINIC | Age: 60
End: 2025-06-16

## 2025-06-16 RX ORDER — LOSARTAN POTASSIUM 50 MG/1
TABLET ORAL
Qty: 60 TABLET | Refills: 3 | Status: SHIPPED | OUTPATIENT
Start: 2025-06-16

## 2025-06-16 NOTE — TELEPHONE ENCOUNTER
Patient called today 06- to let his provider know that he's been in pain since his last office visit. He reports that when he walked down the long hallway it blew out both of his hips. He reports that he's not mobile. He reports his lower back and leg hurts a lot. His grandson will be bringing him to the apt.    Patient needs a UDS to submit. I informed the patient that he needs a uds  submitted before requesting the script.

## 2025-06-16 NOTE — TELEPHONE ENCOUNTER
Cristina Menchaca called to request a refill on his medication. Patient called today to request the refill on Losartan. Patient has appointment on 06-.      Last office visit : 3/19/2025   Next office visit : 6/23/2025     Requested Prescriptions     Pending Prescriptions Disp Refills    losartan (COZAAR) 50 MG tablet 60 tablet 3     Sig: TAKE ONE TABLET BY MOUTH 2 TIMES A DAY IN THE MORNING AND EVENING            Kye Warren MA

## 2025-06-23 ENCOUNTER — RESULTS FOLLOW-UP (OUTPATIENT)
Dept: PRIMARY CARE CLINIC | Age: 60
End: 2025-06-23

## 2025-06-23 ENCOUNTER — OFFICE VISIT (OUTPATIENT)
Dept: PRIMARY CARE CLINIC | Age: 60
End: 2025-06-23
Payer: MEDICARE

## 2025-06-23 VITALS
HEIGHT: 67 IN | HEART RATE: 74 BPM | DIASTOLIC BLOOD PRESSURE: 76 MMHG | WEIGHT: 264.6 LBS | OXYGEN SATURATION: 95 % | BODY MASS INDEX: 41.53 KG/M2 | SYSTOLIC BLOOD PRESSURE: 118 MMHG

## 2025-06-23 DIAGNOSIS — E11.9 TYPE 2 DIABETES MELLITUS WITHOUT COMPLICATION, WITHOUT LONG-TERM CURRENT USE OF INSULIN (HCC): ICD-10-CM

## 2025-06-23 DIAGNOSIS — G62.9 NEUROPATHY: ICD-10-CM

## 2025-06-23 DIAGNOSIS — M06.9 RHEUMATOID ARTHRITIS, INVOLVING UNSPECIFIED SITE, UNSPECIFIED WHETHER RHEUMATOID FACTOR PRESENT (HCC): ICD-10-CM

## 2025-06-23 DIAGNOSIS — R26.89 DECREASED MOBILITY: ICD-10-CM

## 2025-06-23 DIAGNOSIS — E78.49 OTHER HYPERLIPIDEMIA: ICD-10-CM

## 2025-06-23 DIAGNOSIS — E29.1 HYPOGONADISM IN MALE: ICD-10-CM

## 2025-06-23 DIAGNOSIS — Z72.0 TOBACCO ABUSE DISORDER: ICD-10-CM

## 2025-06-23 DIAGNOSIS — M48.00 SPINAL STENOSIS, UNSPECIFIED SPINAL REGION: ICD-10-CM

## 2025-06-23 DIAGNOSIS — K21.9 GASTROESOPHAGEAL REFLUX DISEASE WITHOUT ESOPHAGITIS: ICD-10-CM

## 2025-06-23 DIAGNOSIS — I10 ESSENTIAL HYPERTENSION: ICD-10-CM

## 2025-06-23 DIAGNOSIS — Z51.81 THERAPEUTIC DRUG MONITORING: Primary | ICD-10-CM

## 2025-06-23 LAB
ALBUMIN SERPL-MCNC: 3.9 G/DL (ref 3.5–5.2)
ALCOHOL URINE: NORMAL
ALP SERPL-CCNC: 97 U/L (ref 40–129)
ALT SERPL-CCNC: 23 U/L (ref 10–50)
AMPHETAMINE SCREEN URINE: NORMAL
ANION GAP SERPL CALCULATED.3IONS-SCNC: 15 MMOL/L (ref 8–16)
AST SERPL-CCNC: 14 U/L (ref 10–50)
BARBITURATE SCREEN URINE: NORMAL
BASOPHILS # BLD: 0.1 K/UL (ref 0–0.2)
BASOPHILS NFR BLD: 0.4 % (ref 0–1)
BENZODIAZEPINE SCREEN, URINE: NORMAL
BILIRUB SERPL-MCNC: 0.3 MG/DL (ref 0.2–1.2)
BUN SERPL-MCNC: 35 MG/DL (ref 8–23)
BUPRENORPHINE URINE: NORMAL
CALCIUM SERPL-MCNC: 9.5 MG/DL (ref 8.8–10.2)
CHLORIDE SERPL-SCNC: 102 MMOL/L (ref 98–107)
CO2 SERPL-SCNC: 22 MMOL/L (ref 22–29)
COCAINE METABOLITE SCREEN URINE: NORMAL
CREAT SERPL-MCNC: 1.3 MG/DL (ref 0.7–1.2)
EOSINOPHIL # BLD: 0.2 K/UL (ref 0–0.6)
EOSINOPHIL NFR BLD: 1 % (ref 0–5)
ERYTHROCYTE [DISTWIDTH] IN BLOOD BY AUTOMATED COUNT: 14.7 % (ref 11.5–14.5)
FENTANYL SCREEN, URINE: NORMAL
GABAPENTIN SCREEN, URINE: NORMAL
GLUCOSE SERPL-MCNC: 122 MG/DL (ref 70–99)
HBA1C MFR BLD: 7.3 % (ref 4–5.6)
HCT VFR BLD AUTO: 44.3 % (ref 42–52)
HGB BLD-MCNC: 14.7 G/DL (ref 14–18)
IMM GRANULOCYTES # BLD: 0.3 K/UL
LYMPHOCYTES # BLD: 3.2 K/UL (ref 1.1–4.5)
LYMPHOCYTES NFR BLD: 15.2 % (ref 20–40)
MCH RBC QN AUTO: 31.6 PG (ref 27–31)
MCHC RBC AUTO-ENTMCNC: 33.2 G/DL (ref 33–37)
MCV RBC AUTO: 95.3 FL (ref 80–94)
MDMA, URINE: NORMAL
METHADONE SCREEN, URINE: NORMAL
METHAMPHETAMINE, URINE: NORMAL
MONOCYTES # BLD: 1.1 K/UL (ref 0–0.9)
MONOCYTES NFR BLD: 5.1 % (ref 0–10)
NEUTROPHILS # BLD: 16.2 K/UL (ref 1.5–7.5)
NEUTS SEG NFR BLD: 76.7 % (ref 50–65)
OPIATE SCREEN URINE: NORMAL
OXYCODONE SCREEN URINE: NORMAL
PHENCYCLIDINE SCREEN URINE: NORMAL
PLATELET # BLD AUTO: 384 K/UL (ref 130–400)
PMV BLD AUTO: 9.8 FL (ref 9.4–12.4)
POTASSIUM SERPL-SCNC: 4.2 MMOL/L (ref 3.5–5.1)
PROPOXYPHENE SCREEN, URINE: NORMAL
PROT SERPL-MCNC: 6.9 G/DL (ref 6.4–8.3)
PSA SERPL-MCNC: 1.14 NG/ML (ref 0–4)
RBC # BLD AUTO: 4.65 M/UL (ref 4.7–6.1)
SODIUM SERPL-SCNC: 139 MMOL/L (ref 136–145)
SYNTHETIC CANNABINOIDS(K2) SCREEN, URINE: NORMAL
TESTOST SERPL-MCNC: 34.1 NG/DL (ref 193–740)
THC SCREEN, URINE: POSITIVE
TRAMADOL SCREEN URINE: NORMAL
TRICYCLIC ANTIDEPRESSANTS, UR: NORMAL
WBC # BLD AUTO: 21.1 K/UL (ref 4.8–10.8)

## 2025-06-23 PROCEDURE — 3074F SYST BP LT 130 MM HG: CPT | Performed by: NURSE PRACTITIONER

## 2025-06-23 PROCEDURE — 99214 OFFICE O/P EST MOD 30 MIN: CPT | Performed by: NURSE PRACTITIONER

## 2025-06-23 PROCEDURE — 3078F DIAST BP <80 MM HG: CPT | Performed by: NURSE PRACTITIONER

## 2025-06-23 PROCEDURE — 80305 DRUG TEST PRSMV DIR OPT OBS: CPT | Performed by: NURSE PRACTITIONER

## 2025-06-23 RX ORDER — ALBUTEROL SULFATE 90 UG/1
INHALANT RESPIRATORY (INHALATION)
Qty: 18 G | Refills: 3 | Status: SHIPPED | OUTPATIENT
Start: 2025-06-23

## 2025-06-23 RX ORDER — GABAPENTIN 300 MG/1
300 CAPSULE ORAL 4 TIMES DAILY
Qty: 120 CAPSULE | Refills: 0 | Status: SHIPPED | OUTPATIENT
Start: 2025-06-23 | End: 2025-07-23

## 2025-06-23 ASSESSMENT — ENCOUNTER SYMPTOMS
VOMITING: 0
COUGH: 0
PHOTOPHOBIA: 0
SHORTNESS OF BREATH: 0
COLOR CHANGE: 0
BACK PAIN: 1
RHINORRHEA: 0
VOICE CHANGE: 0
NAUSEA: 0

## 2025-06-23 NOTE — PROGRESS NOTES
1.702 m (5' 7\")   Wt 120 kg (264 lb 9.6 oz)   SpO2 95%   BMI 41.44 kg/m²     Results  Imaging   - CT of lumbar spine: 04/14/2025, Stenosis, arthritis, and a small aneurysm       Assessment:   Assessment & Plan   Assessment & Plan  1. Sacroiliac (SI) joint pain: Chronic.  - Referral for physical therapy with [facility] in [place] has been initiated to address instability.  - Advised to continue using the SI belt, which has been helpful.  - Using gabapentin and received SI joint injections from Dr. Balbuena, which provided temporary relief.    2. Wheezing: Chronic.  - Prescription for an inhaler will be provided.  - Reports that prednisone helps with symptoms.    3. Aneurysm.  - Scheduled to see vascular specialist Melanie, as referred by Dr. Carmona.    4. Medication management.  - Prescription for gabapentin will be provided.  - Not taking cholesterol medication due to side effects.    Follow-up  - Follow up in 3 months for Medicare annual wellness visit.      Diagnosis Orders   1. Therapeutic drug monitoring  POCT Rapid Drug Screen      2. Spinal stenosis, unspecified spinal region  Amb External Referral To Physical Therapy    gabapentin (NEURONTIN) 300 MG capsule      3. Decreased mobility  Amb External Referral To Physical Therapy      4. Type 2 diabetes mellitus without complication, without long-term current use of insulin (HCC)        5. Essential hypertension        6. Neuropathy  gabapentin (NEURONTIN) 300 MG capsule            Plan:   Total time spent today caring for this patient was 30 minutes    PDMP Monitoring:    Last PDMP Carter as Reviewed:  Review User Review Instant Review Result   GINGER MANNING 6/23/2025 12:41 PM Reviewed PDMP [1]       Urine Drug Screenings (1 yr)       POCT Rapid Drug Screen  Collected: 1/2/2024 (Final result)              POCT Rapid Drug Screen  Collected: 8/8/2023 (Final result)                  Medication Contract and Consent for Opioid Use Documents Filed       Patient

## 2025-07-08 ENCOUNTER — RESULTS FOLLOW-UP (OUTPATIENT)
Dept: PRIMARY CARE CLINIC | Age: 60
End: 2025-07-08

## 2025-07-16 ENCOUNTER — TELEPHONE (OUTPATIENT)
Dept: VASCULAR SURGERY | Age: 60
End: 2025-07-16

## 2025-07-16 NOTE — TELEPHONE ENCOUNTER
I called the pt to remind him of his appt tomorrow and he wanted to know how long the test we are doing last.  I explained that at this time he isnt a pt until we see him and we can't order a test ahead of time.  Pt begins to scream at me and tell me all kinds of things he hates about [facility] and we did this to him and lied to him and on and on.  He said just forget this and he will find another provider.

## 2025-07-24 DIAGNOSIS — I10 ESSENTIAL HYPERTENSION: ICD-10-CM

## 2025-07-24 RX ORDER — METOPROLOL SUCCINATE 25 MG/1
25 TABLET, EXTENDED RELEASE ORAL DAILY
Qty: 90 TABLET | Refills: 3 | Status: SHIPPED | OUTPATIENT
Start: 2025-07-24

## 2025-07-24 NOTE — TELEPHONE ENCOUNTER
Cristina Menchaca called to request a refill on his medication.      Last office visit : 6/23/2025   Next office visit : 9/23/2025     Requested Prescriptions     Pending Prescriptions Disp Refills    metoprolol succinate (TOPROL XL) 25 MG extended release tablet [Pharmacy Med Name: metoprolol succinate ER 25 mg tablet,extended release 24 hr] 90 tablet 3     Sig: TAKE ONE TABLET BY MOUTH EVERY DAY            Aysha Lei MA

## (undated) DEVICE — GLOVE ORANGE PI 7 1/2   MSG9075

## (undated) DEVICE — NERVE BLOCK TRAY (FACET)-LF: Brand: MEDLINE INDUSTRIES, INC.